# Patient Record
Sex: FEMALE | Race: WHITE | Employment: OTHER | RURAL
[De-identification: names, ages, dates, MRNs, and addresses within clinical notes are randomized per-mention and may not be internally consistent; named-entity substitution may affect disease eponyms.]

---

## 2017-01-25 ENCOUNTER — OFFICE VISIT (OUTPATIENT)
Dept: FAMILY MEDICINE CLINIC | Age: 47
End: 2017-01-25

## 2017-01-25 VITALS
HEART RATE: 86 BPM | WEIGHT: 211.6 LBS | DIASTOLIC BLOOD PRESSURE: 84 MMHG | BODY MASS INDEX: 36.12 KG/M2 | HEIGHT: 64 IN | SYSTOLIC BLOOD PRESSURE: 122 MMHG | RESPIRATION RATE: 20 BRPM | TEMPERATURE: 98.4 F | OXYGEN SATURATION: 98 %

## 2017-01-25 DIAGNOSIS — M79.602 PARESTHESIA AND PAIN OF BOTH UPPER EXTREMITIES: ICD-10-CM

## 2017-01-25 DIAGNOSIS — M79.601 PARESTHESIA AND PAIN OF BOTH UPPER EXTREMITIES: ICD-10-CM

## 2017-01-25 DIAGNOSIS — E78.00 HYPERCHOLESTEROLEMIA: ICD-10-CM

## 2017-01-25 DIAGNOSIS — I10 ESSENTIAL HYPERTENSION, BENIGN: ICD-10-CM

## 2017-01-25 DIAGNOSIS — M79.7 FIBROMYALGIA: Primary | ICD-10-CM

## 2017-01-25 DIAGNOSIS — R20.2 PARESTHESIA AND PAIN OF BOTH UPPER EXTREMITIES: ICD-10-CM

## 2017-01-25 DIAGNOSIS — E03.9 UNSPECIFIED HYPOTHYROIDISM: ICD-10-CM

## 2017-01-25 RX ORDER — TIZANIDINE HYDROCHLORIDE 4 MG/1
CAPSULE, GELATIN COATED ORAL
Qty: 90 CAP | Refills: 1 | Status: SHIPPED | OUTPATIENT
Start: 2017-01-25 | End: 2017-05-10 | Stop reason: SINTOL

## 2017-01-25 RX ORDER — TRAMADOL HYDROCHLORIDE 50 MG/1
50 TABLET ORAL
Qty: 270 TAB | Refills: 1 | Status: SHIPPED | OUTPATIENT
Start: 2017-01-25 | End: 2017-07-28 | Stop reason: SDUPTHER

## 2017-01-25 NOTE — PROGRESS NOTES
HISTORY OF PRESENT ILLNESS  Branden Bradshaw is a 48 y.o. female. HPI  FU fibromyalgia. Also C/O tightness and tingling across left clavicle when she lifts or carry anything heavy. Pt also wants to try different muscle relaxer. We reviewed her lab results: mild high cholesterol. Review of Systems   Musculoskeletal: Positive for myalgias. Neurological: Positive for tingling. All other systems reviewed and are negative.     Past Medical History   Diagnosis Date    Abnormal Pap smear 1/2/2012     FUNMI -evaluation negative    Arthritis     Asthma     Atypical squamous cells of undetermined significance (ASCUS) on Papanicolaou smear of cervix 10/08/15     HPV Negative    Autoimmune disease ([facility]      sjogrens    Bartholin's gland cyst      right marsupialization    Diverticulitis      in the past- has seen Dr. Rosa Franco for evaluation    Dyspareunia     Endometriosis     Fibromyalgia     GERD (gastroesophageal reflux disease)     Hiatal hernia     Hypercholesterolemia 1/25/2017    Hypertension     Hypothyroid     IBS (irritable bowel syndrome)     Lichen sclerosus     Nausea & vomiting     Other ill-defined conditions(799.89)      left leg edema    Pelvic pain     Rosacea     Sjogren's syndrome ([facility] [address]      sees Dr. 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     vulvar biopsy done-suggestive of LS     Past Surgical History   Procedure Laterality Date    Hx cholecystectomy      Hx orthopaedic Left      ACLX2    Hx laparoscopic supracervical hysterectomy  11/25/13     w/RSO/hysterectomy    Hx other surgical       Bartholins Marsupialization    Hx colposcopy  2/14/12     No dysplasia    Hx other surgical  02/07     LSO--Laparotomy w/ [place] also    Hx other surgical  11/08     vulvar biopsy    Hx dilation and curettage  2/14/2012     benign tissue- problems with anesthesia afterwards    Hx oophorectomy      Hx hysterectomy       Current Outpatient Prescriptions   Medication Sig Dispense Refill    traMADol (ULTRAM) 50 mg tablet Take 1 Tab by mouth every eight (8) hours as needed for Pain. 270 Tab 1    tiZANidine (ZANAFLEX) 4 mg capsule Take 1 at bedtime. Indications: MUSCLE SPASM 90 Cap 1    estradiol (VIVELLE) 0.1 mg/24 hr APPLY 1 PATCH TO SKIN EVERY MONDAY AND THURSDAY 8 Patch 1    losartan-hydrochlorothiazide (HYZAAR) 100-25 mg per tablet TAKE 1 TABLET DAILY 90 Tab 1    montelukast (SINGULAIR) 10 mg tablet TAKE 1 TABLET DAILY 90 Tab 1    SAVELLA 25 mg tablet TAKE 1 TABLET TWICE A  Tab 1    SYNTHROID 137 mcg tablet Take 137 mcg by mouth Daily (before breakfast).  valACYclovir (VALTREX) 500 mg tablet Take 500 mg by mouth daily.  multivitamin (ONE A DAY) tablet Take 1 Tab by mouth daily.  omeprazole (PRILOSEC) 20 mg capsule Take 20 mg by mouth daily.  ergocalciferol, vitamin d2, 50,000 unit Tab Take 50,000 Units by mouth. Indications: VITAMIN D DEFICIENCY      EPINEPHrine (EPIPEN) 0.3 mg/0.3 mL (1:1,000) injection 0.3 mL by IntraMUSCular route once as needed for up to 1 dose. 2 Each 1    albuterol (VENTOLIN HFA) 90 mcg/actuation inhaler Take 2 Puffs by inhalation every four (4) hours as needed for Wheezing. 3 Inhaler 1    acetaminophen (TYLENOL EXTRA STRENGTH) 500 mg tablet Take 1,000 mg by mouth every six (6) hours as needed for Pain. Social History     Social History    Marital status:      Spouse name: N/A    Number of children: N/A    Years of education: N/A     Occupational History    Not on file.      Social History Main Topics    Smoking status: Never Smoker    Smokeless tobacco: Never Used    Alcohol use 0.0 oz/week      Comment: very rarely-less than 5X/year    Drug use: No    Sexual activity: Yes     Partners: Male     Birth control/ protection: Surgical      Comment: Hysterectomy     Other Topics Concern    Not on file     Social History Narrative Family History   Problem Relation Age of Onset    Heart Disease Mother      pacemaker, defibrillator    Osteoporosis Mother     Diabetes Brother     Hypertension Father        Visit Vitals    /84 (BP 1 Location: Left arm, BP Patient Position: Sitting)  Comment: nicole    Pulse 86    Temp 98.4 °F (36.9 °C) (Oral)    Resp 20    Ht 5' 4\" (1.626 m)    Wt 211 lb 9.6 oz (96 kg)    LMP 11/05/2013    SpO2 98%    BMI 36.32 kg/m2       Physical Exam   Constitutional: She appears well-developed and well-nourished. Neck: Normal range of motion. Neck supple. No thyroid mass present. Musculoskeletal:        Right shoulder: Normal. She exhibits normal range of motion and no tenderness. Left shoulder: Normal. She exhibits normal range of motion and no tenderness. Vitals reviewed. ASSESSMENT and PLAN    ICD-10-CM ICD-9-CM    1. Fibromyalgia M79.7 729.1 traMADol (ULTRAM) 50 mg tablet      tiZANidine (ZANAFLEX) 4 mg capsule   2. Essential hypertension, benign I10 401.1    3. Unspecified hypothyroidism E03.9 244.9    4. Paresthesia and pain of both upper extremities R20.2 782.0     M79.601 729.5     M79.602     5. Hypercholesterolemia E78.00 272.0      Switch muscle relaxer to Tizanidine QHS. Refill Tramadol for pain. FU with Rheumatology regarding tingling and loss of upper arm strength. ? Cervical disc disease. Watch cholesterol in diet. Patient Instructions        Learning About High Cholesterol  What is high cholesterol? Cholesterol is a type of fat in your blood. It is needed for many body functions, such as making new cells. Cholesterol is made by your body. It also comes from food you eat. If you have too much cholesterol, it starts to build up in your arteries. This is called hardening of the arteries, or atherosclerosis. High cholesterol raises your risk of a heart attack and stroke. There are different types of cholesterol. LDL is the \"bad\" cholesterol.  High LDL can raise your risk for heart disease, heart attack, and stroke. HDL is the \"good\" cholesterol. High HDL is linked with a lower risk for heart disease, heart attack, and stroke. Your cholesterol levels help your doctor find out your risk for having a heart attack or stroke. How can you prevent high cholesterol? A heart-healthy lifestyle can help you prevent high cholesterol. This lifestyle helps lower your risk for a heart attack and stroke. · Eat heart-healthy foods. ¨ Eat fruits, vegetables, whole grains (like oatmeal), dried beans and peas, nuts and seeds, soy products (like tofu), and fat-free or low-fat dairy products. ¨ Replace butter, margarine, and hydrogenated or partially hydrogenated oils with olive and canola oils. (Canola oil margarine without trans fat is fine.)  ¨ Replace red meat with fish, poultry, and soy protein (like tofu). ¨ Limit processed and packaged foods like chips, crackers, and cookies. · Be active. Exercise can improve your cholesterol level. Get at least 30 minutes of exercise on most days of the week. Walking is a good choice. You also may want to do other activities, such as running, swimming, cycling, or playing tennis or team sports. · Stay at a healthy weight. Lose weight if you need to. · Don't smoke. If you need help quitting, talk to your doctor about stop-smoking programs and medicines. These can increase your chances of quitting for good. How is high cholesterol treated? The goal of treatment is to reduce your chances of having a heart attack or stroke. The goal is not to lower your cholesterol numbers only. · You may make lifestyle changes, such as eating healthy foods, not smoking, losing weight, and being more active. · You may have to take medicine. Follow-up care is a key part of your treatment and safety. Be sure to make and go to all appointments, and call your doctor if you are having problems.  It's also a good idea to know your test results and keep a list of the medicines you take. Where can you learn more? Go to http://jesus-manasa.info/. Enter R179 in the search box to learn more about \"Learning About High Cholesterol. \"  Current as of: January 27, 2016  Content Version: 11.1  © 1385-6287 [employer]. Care instructions adapted under license by [employer] (which disclaims liability or warranty for this information). If you have questions about a medical condition or this instruction, always ask your healthcare professional. [address] any warranty or liability for your use of this information.

## 2017-01-25 NOTE — PATIENT INSTRUCTIONS
Learning About High Cholesterol  What is high cholesterol? Cholesterol is a type of fat in your blood. It is needed for many body functions, such as making new cells. Cholesterol is made by your body. It also comes from food you eat. If you have too much cholesterol, it starts to build up in your arteries. This is called hardening of the arteries, or atherosclerosis. High cholesterol raises your risk of a heart attack and stroke. There are different types of cholesterol. LDL is the \"bad\" cholesterol. High LDL can raise your risk for heart disease, heart attack, and stroke. HDL is the \"good\" cholesterol. High HDL is linked with a lower risk for heart disease, heart attack, and stroke. Your cholesterol levels help your doctor find out your risk for having a heart attack or stroke. How can you prevent high cholesterol? A heart-healthy lifestyle can help you prevent high cholesterol. This lifestyle helps lower your risk for a heart attack and stroke. · Eat heart-healthy foods. ¨ Eat fruits, vegetables, whole grains (like oatmeal), dried beans and peas, nuts and seeds, soy products (like tofu), and fat-free or low-fat dairy products. ¨ Replace butter, margarine, and hydrogenated or partially hydrogenated oils with olive and canola oils. (Canola oil margarine without trans fat is fine.)  ¨ Replace red meat with fish, poultry, and soy protein (like tofu). ¨ Limit processed and packaged foods like chips, crackers, and cookies. · Be active. Exercise can improve your cholesterol level. Get at least 30 minutes of exercise on most days of the week. Walking is a good choice. You also may want to do other activities, such as running, swimming, cycling, or playing tennis or team sports. · Stay at a healthy weight. Lose weight if you need to. · Don't smoke. If you need help quitting, talk to your doctor about stop-smoking programs and medicines. These can increase your chances of quitting for good.   How is high cholesterol treated? The goal of treatment is to reduce your chances of having a heart attack or stroke. The goal is not to lower your cholesterol numbers only. · You may make lifestyle changes, such as eating healthy foods, not smoking, losing weight, and being more active. · You may have to take medicine. Follow-up care is a key part of your treatment and safety. Be sure to make and go to all appointments, and call your doctor if you are having problems. It's also a good idea to know your test results and keep a list of the medicines you take. Where can you learn more? Go to http://jesus-manasa.info/. Enter W480 in the search box to learn more about \"Learning About High Cholesterol. \"  Current as of: January 27, 2016  Content Version: 11.1  © 0434-9990 [employer]. Care instructions adapted under license by [employer] (which disclaims liability or warranty for this information). If you have questions about a medical condition or this instruction, always ask your healthcare professional. [address] any warranty or liability for your use of this information.

## 2017-01-25 NOTE — MR AVS SNAPSHOT
Visit Information Date & Time Provider Department Dept. Phone Encounter #  
 3/21/6184  7:98 PM Paul [facility]onMatt[facility] [phone] Your Appointments 3/10/2017  2:20 PM  
MAMMOGRAPHY with MAMMOGRAM, MIGUEL ANGEL Li[place] ([address]) Appt Note: mammo + ae  HW/uh  
 [address] [place] [address]  
[phone]  
  
   
 [address] [place] [address]  
  
    
 3/10/2017  2:40 PM  
ESTABLISHED PATIENT with Janine Hall MD  
[place] ([address]) Appt Note: mammo + ae  HW/uh  
 [address] [facility] [address] [id number]  
[address] [place] [address] Upcoming Health Maintenance Date Due Pneumococcal 19-64 Medium Risk (1 of 1 - PPSV23) 3/14/1987 PAP AKA CERVICAL CYTOLOGY 10/8/2018 DTaP/Tdap/Td series (2 - Td) 1/25/2027 Allergies as of 1/25/2017  Review Complete On: 3/68/4566 By: Paul Damon MD  
  
 Severity Noted Reaction Type Reactions Monsel's [Ferric Subsulfate]  11/18/2013    Swelling Extreme burning, skin sloughing Other Plant, Animal, Environmental  05/19/2014    Unknown (comments) Vinegar Premarin [Conjugated Estrogens]  11/25/2013   Topical Itching Current Immunizations  Reviewed on 10/8/2012 Name Date Influenza Vaccine (Quad) PF 12/9/2015, 11/26/2014 Influenza Vaccine Split 10/8/2012 Not reviewed this visit You Were Diagnosed With   
  
 Codes Comments Fibromyalgia    -  Primary ICD-10-CM: M79.7 ICD-9-CM: 729.1 Essential hypertension, benign     ICD-10-CM: I10 
ICD-9-CM: 401.1 Unspecified hypothyroidism     ICD-10-CM: E03.9 ICD-9-CM: 244.9 Paresthesia and pain of both upper extremities     ICD-10-CM: R20.2, M79.601, M79.602 ICD-9-CM: 782.0, 729.5 Hypercholesterolemia     ICD-10-CM: E78.00 ICD-9-CM: 272.0 Vitals BP Pulse Temp Resp Height(growth percentile) Weight(growth percentile) 122/84 (BP 1 Location: Left arm, BP Patient Position: Sitting) 86 98.4 °F (36.9 °C) (Oral) 20 5' 4\" (1.626 m) 211 lb 9.6 oz (96 kg) LMP SpO2 BMI OB Status Smoking Status 11/05/2013 98% 36.32 kg/m2 Hysterectomy Never Smoker Vitals History BMI and BSA Data Body Mass Index Body Surface Area  
 36.32 kg/m 2 2.08 m 2 Preferred Pharmacy Pharmacy Name Phone [address] [facility] [phone] Your Updated Medication List  
  
   
This list is accurate as of: 1/25/17  4:10 PM.  Always use your most recent med list.  
  
  
  
  
 albuterol 90 mcg/actuation inhaler Commonly known as:  VENTOLIN HFA Take 2 Puffs by inhalation every four (4) hours as needed for Wheezing. EPINEPHrine 0.3 mg/0.3 mL injection Commonly known as:  EPIPEN  
0.3 mL by IntraMUSCular route once as needed for up to 1 dose. ergocalciferol (vitamin d2) 50,000 unit Tab Take 50,000 Units by mouth. Indications: VITAMIN D DEFICIENCY  
  
 estradiol 0.1 mg/24 hr  
Commonly known as:  VIVELLE  
APPLY 1 PATCH TO SKIN EVERY MONDAY AND THURSDAY  
  
 losartan-hydroCHLOROthiazide 100-25 mg per tablet Commonly known as:  HYZAAR  
TAKE 1 TABLET DAILY  
  
 montelukast 10 mg tablet Commonly known as:  SINGULAIR  
TAKE 1 TABLET DAILY  
  
 multivitamin tablet Commonly known as:  ONE A DAY Take 1 Tab by mouth daily. omeprazole 20 mg capsule Commonly known as:  PRILOSEC Take 20 mg by mouth daily. SAVELLA 25 mg tablet Generic drug:  Milnacipran TAKE 1 TABLET TWICE A DAY  
  
 SYNTHROID 137 mcg tablet Generic drug:  levothyroxine Take 137 mcg by mouth Daily (before breakfast). tiZANidine 4 mg capsule Commonly known as:  Lourdes Mera Take 1 at bedtime. Indications: MUSCLE SPASM  
  
 traMADol 50 mg tablet Commonly known as:  Corinn Morning  
 Take 1 Tab by mouth every eight (8) hours as needed for Pain. TYLENOL EXTRA STRENGTH 500 mg tablet Generic drug:  acetaminophen Take 1,000 mg by mouth every six (6) hours as needed for Pain. VALTREX 500 mg tablet Generic drug:  valACYclovir Take 500 mg by mouth daily. Prescriptions Printed Refills  
 traMADol (ULTRAM) 50 mg tablet 1 Sig: Take 1 Tab by mouth every eight (8) hours as needed for Pain. Class: Print Route: Oral  
  
Prescriptions Sent to Pharmacy Refills  
 tiZANidine (ZANAFLEX) 4 mg capsule 1 Sig: Take 1 at bedtime. Indications: MUSCLE SPASM Class: Normal  
 Pharmacy: [address], [address] [place] [address] #: [phone] Patient Instructions Learning About High Cholesterol What is high cholesterol? Cholesterol is a type of fat in your blood. It is needed for many body functions, such as making new cells. Cholesterol is made by your body. It also comes from food you eat. If you have too much cholesterol, it starts to build up in your arteries. This is called hardening of the arteries, or atherosclerosis. High cholesterol raises your risk of a heart attack and stroke. There are different types of cholesterol. LDL is the \"bad\" cholesterol. High LDL can raise your risk for heart disease, heart attack, and stroke. HDL is the \"good\" cholesterol. High HDL is linked with a lower risk for heart disease, heart attack, and stroke. Your cholesterol levels help your doctor find out your risk for having a heart attack or stroke. How can you prevent high cholesterol? A heart-healthy lifestyle can help you prevent high cholesterol. This lifestyle helps lower your risk for a heart attack and stroke. · Eat heart-healthy foods. ¨ Eat fruits, vegetables, whole grains (like oatmeal), dried beans and peas, nuts and seeds, soy products (like tofu), and fat-free or low-fat dairy products. ¨ Replace butter, margarine, and hydrogenated or partially hydrogenated oils with olive and canola oils. (Canola oil margarine without trans fat is fine.) ¨ Replace red meat with fish, poultry, and soy protein (like tofu). ¨ Limit processed and packaged foods like chips, crackers, and cookies. · Be active. Exercise can improve your cholesterol level. Get at least 30 minutes of exercise on most days of the week. Walking is a good choice. You also may want to do other activities, such as running, swimming, cycling, or playing tennis or team sports. · Stay at a healthy weight. Lose weight if you need to. · Don't smoke. If you need help quitting, talk to your doctor about stop-smoking programs and medicines. These can increase your chances of quitting for good. How is high cholesterol treated? The goal of treatment is to reduce your chances of having a heart attack or stroke. The goal is not to lower your cholesterol numbers only. · You may make lifestyle changes, such as eating healthy foods, not smoking, losing weight, and being more active. · You may have to take medicine. Follow-up care is a key part of your treatment and safety. Be sure to make and go to all appointments, and call your doctor if you are having problems. It's also a good idea to know your test results and keep a list of the medicines you take. Where can you learn more? Go to http://jesus-manasa.info/. Enter N668 in the search box to learn more about \"Learning About High Cholesterol. \" Current as of: January 27, 2016 Content Version: 11.1 © 2258-2790 [employer], Incorporated. Care instructions adapted under license by [employer] (which disclaims liability or warranty for this information). If you have questions about a medical condition or this instruction, always ask your healthcare professional. [address] any warranty or liability for your use of this information. Introducing [facility]! Dear Trevon Duncan: 
Thank you for requesting a [employer] account. Our records indicate that you already have an active [employer] account. You can access your account anytime at [email or web address] Did you know that you can access your hospital and ER discharge instructions at any time in [employer]? You can also review all of your test results from your hospital stay or ER visit. Additional Information If you have questions, please visit the Frequently Asked Questions section of the [employer] website at [email or web address] Remember, [employer] is NOT to be used for urgent needs. For medical emergencies, dial 911. Now available from your iPhone and Android! Please provide this summary of care documentation to your next provider. Your primary care clinician is listed as Nathan Fee. If you have any questions after today's visit, please call [phone].

## 2017-01-25 NOTE — PROGRESS NOTES
Identified pt with two pt identifiers(name and ). Chief Complaint   Patient presents with    Medication Refill    Results    Shoulder Pain     her shoulders mostly the left - feels like they are binding and she does not have the strength she used to - started in nov     Referral Follow Up     Dr Anna Marie Chambers Maintenance Due   Topic    Pneumococcal 19-64 Medium Risk (1 of 1 - PPSV23)       Wt Readings from Last 3 Encounters:   17 211 lb 9.6 oz (96 kg)   16 208 lb 3.2 oz (94.4 kg)   16 210 lb (95.3 kg)     Temp Readings from Last 3 Encounters:   17 98.4 °F (36.9 °C) (Oral)   16 97.9 °F (36.6 °C) (Oral)   12/09/15 97.8 °F (36.6 °C) (Oral)     BP Readings from Last 3 Encounters:   17 122/84   16 138/77   16 132/83     Pulse Readings from Last 3 Encounters:   17 86   16 100   16 93         Learning Assessment:  :     Learning Assessment 2014   PRIMARY LEARNER Patient Patient   HIGHEST LEVEL OF EDUCATION - PRIMARY LEARNER  - SOME COLLEGE   BARRIERS PRIMARY LEARNER - NONE   CO-LEARNER CAREGIVER - No   PRIMARY LANGUAGE ENGLISH ENGLISH   LEARNER PREFERENCE PRIMARY DEMONSTRATION DEMONSTRATION     - OTHER (COMMENT)   ANSWERED BY patient self   RELATIONSHIP SELF SELF       Depression Screening:  :     PHQ 2 / 9, over the last two weeks 2017   Little interest or pleasure in doing things Not at all   Feeling down, depressed or hopeless Not at all   Total Score PHQ 2 0       Fall Risk Assessment:  :     Fall Risk Assessment, last 12 mths 2017   Able to walk? Yes   Fall in past 12 months? No       Abuse Screening:  :     Abuse Screening Questionnaire 2017   Do you ever feel afraid of your partner? N N   Are you in a relationship with someone who physically or mentally threatens you? N N   Is it safe for you to go home?  Y Y       Coordination of Care Questionnaire:  :     1) Have you been to an emergency room, urgent care clinic since your last visit? no   Hospitalized since your last visit? no             2) Have you seen or consulted any other health care providers outside of [address] [place] [address] since your last visit? Yes she sees her Endocrinologist  (Include any pap smears or colon screenings in this section.)    3) Do you have an Advance Directive on file? no  Are you interested in receiving information about Advance Directives? no    Reviewed record in preparation for visit and have obtained necessary documentation. Medication reconciliation up to date and corrected with patient at this time.

## 2017-02-24 RX ORDER — MILNACIPRAN HYDROCHLORIDE 25 MG/1
TABLET, FILM COATED ORAL
Qty: 180 TAB | Refills: 0 | Status: SHIPPED | OUTPATIENT
Start: 2017-02-24 | End: 2017-05-10 | Stop reason: SDUPTHER

## 2017-03-03 RX ORDER — MONTELUKAST SODIUM 10 MG/1
TABLET ORAL
Qty: 90 TAB | Refills: 0 | Status: SHIPPED | OUTPATIENT
Start: 2017-03-03 | End: 2017-05-10 | Stop reason: SDUPTHER

## 2017-03-10 ENCOUNTER — OFFICE VISIT (OUTPATIENT)
Dept: OBGYN CLINIC | Age: 47
End: 2017-03-10

## 2017-03-10 ENCOUNTER — APPOINTMENT (OUTPATIENT)
Dept: MAMMOGRAPHY | Age: 47
End: 2017-03-10

## 2017-03-10 VITALS
WEIGHT: 213 LBS | DIASTOLIC BLOOD PRESSURE: 60 MMHG | HEIGHT: 64 IN | SYSTOLIC BLOOD PRESSURE: 128 MMHG | BODY MASS INDEX: 36.37 KG/M2 | RESPIRATION RATE: 18 BRPM

## 2017-03-10 DIAGNOSIS — Z01.419 WELL WOMAN EXAM WITH ROUTINE GYNECOLOGICAL EXAM: Primary | ICD-10-CM

## 2017-03-10 DIAGNOSIS — Z12.31 ENCOUNTER FOR SCREENING MAMMOGRAM FOR MALIGNANT NEOPLASM OF BREAST: ICD-10-CM

## 2017-03-10 DIAGNOSIS — Z11.51 SCREENING FOR HPV (HUMAN PAPILLOMAVIRUS): ICD-10-CM

## 2017-03-10 RX ORDER — ESTRADIOL 0.1 MG/D
FILM, EXTENDED RELEASE TRANSDERMAL
Qty: 24 PATCH | Refills: 3 | Status: SHIPPED | OUTPATIENT
Start: 2017-03-10 | End: 2019-01-11

## 2017-03-10 NOTE — PROGRESS NOTES
Dodie Mackay is a [de-identified] ,  48 y.o. female [facility] whose Patient's last menstrual period was 11/05/2013. was on  who presents for her annual checkup. She is having breast tenderness - denies nipple discharge. Very mild. No other changes. US last year was normal.    Hormone Status:    She is not having vasomotor symptoms. Sexual history:    She  reports that she currently engages in sexual activity and has had male partners. She reports using the following method of birth control/protection: Surgical.    Medical conditions:    Since her last annual GYN exam about one year ago, she has had the following changes in her health history: none. Pap and Mammogram History:    Her most recent Pap smear was abnormal obtained 1 year(s) ago. The patient had her mammogram today in our office. Breast Cancer History/Substance Abuse:    She has no and a family history of breast cancer. Osteoporosis History:    Family history does not include a first or second degree relative with osteopenia or osteoporosis. A bone density scan was not obtained. She is currently taking calcium and vit D.       Past Medical History:   Diagnosis Date    Abnormal Pap smear 1/2/2012    FUNMI -evaluation negative    Arthritis     Asthma     Atypical squamous cells of undetermined significance (ASCUS) on Papanicolaou smear of cervix 10/08/15    HPV Negative    Autoimmune disease ([address]     sjogrens    Bartholin's gland cyst     right marsupialization    Diverticulitis     in the past- has seen Dr. De Paz for evaluation    Dyspareunia     Endometriosis     Fibromyalgia     GERD (gastroesophageal reflux disease)     Hiatal hernia     Hypercholesterolemia 1/25/2017    Hypertension     Hypothyroid     IBS (irritable bowel syndrome)     Lichen sclerosus     Nausea & vomiting     Other ill-defined conditions(799.89)     left leg edema    Pelvic pain     Rosacea     Sjogren's syndrome ([address] sees Dr. 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    vulvar biopsy done-suggestive of LS     Past Surgical History:   Procedure Laterality Date    HX CHOLECYSTECTOMY      HX COLPOSCOPY  2/14/12    No dysplasia    HX DILATION AND CURETTAGE  2/14/2012    benign tissue- problems with anesthesia afterwards    HX HYSTERECTOMY      HX LAPAROSCOPIC SUPRACERVICAL HYSTERECTOMY  11/25/13    w/RSO/hysterectomy    HX OOPHORECTOMY      HX ORTHOPAEDIC Left     ACLX2    HX OTHER SURGICAL      Bartholins Marsupialization    HX OTHER SURGICAL  02/07    LSO--Laparotomy w/ [place] also    HX OTHER SURGICAL  11/08    vulvar biopsy     Tobacco History:  reports that she has never smoked. She has never used smokeless tobacco.  Alcohol Abuse:  reports that she drinks alcohol. Drug Abuse:  reports that she does not use illicit drugs. Current Outpatient Prescriptions   Medication Sig Dispense Refill    montelukast (SINGULAIR) 10 mg tablet TAKE 1 TABLET DAILY 90 Tab 0    SAVELLA 25 mg tablet TAKE 1 TABLET TWICE A  Tab 0    traMADol (ULTRAM) 50 mg tablet Take 1 Tab by mouth every eight (8) hours as needed for Pain. 270 Tab 1    estradiol (VIVELLE) 0.1 mg/24 hr APPLY 1 PATCH TO SKIN EVERY MONDAY AND THURSDAY 8 Patch 1    losartan-hydrochlorothiazide (HYZAAR) 100-25 mg per tablet TAKE 1 TABLET DAILY 90 Tab 1    SYNTHROID 137 mcg tablet Take 137 mcg by mouth Daily (before breakfast).  EPINEPHrine (EPIPEN) 0.3 mg/0.3 mL (1:1,000) injection 0.3 mL by IntraMUSCular route once as needed for up to 1 dose. 2 Each 1    valACYclovir (VALTREX) 500 mg tablet Take 500 mg by mouth daily.  albuterol (VENTOLIN HFA) 90 mcg/actuation inhaler Take 2 Puffs by inhalation every four (4) hours as needed for Wheezing. 3 Inhaler 1    acetaminophen (TYLENOL EXTRA STRENGTH) 500 mg tablet Take 1,000 mg by mouth every six (6) hours as needed for Pain.       multivitamin (ONE A DAY) tablet Take 1 Tab by mouth daily.  omeprazole (PRILOSEC) 20 mg capsule Take 20 mg by mouth daily.  ergocalciferol, vitamin d2, 50,000 unit Tab Take 50,000 Units by mouth. Indications: VITAMIN D DEFICIENCY      tiZANidine (ZANAFLEX) 4 mg capsule Take 1 at bedtime. Indications: MUSCLE SPASM 90 Cap 1     Allergies: Monsel's [ferric subsulfate]; Other plant, animal, environmental; and Premarin [conjugated estrogens]   Social History     Social History    Marital status:      Spouse name: N/A    Number of children: N/A    Years of education: N/A     Occupational History    Not on file.      Social History Main Topics    Smoking status: Never Smoker    Smokeless tobacco: Never Used    Alcohol use 0.0 oz/week      Comment: very rarely-less than 5X/year    Drug use: No    Sexual activity: Yes     Partners: Male     Birth control/ protection: Surgical      Comment: Hysterectomy     Other Topics Concern    Not on file     Social History Narrative     Patient Active Problem List   Diagnosis Code    Unspecified hypothyroidism E03.9    Acne rosacea L71.9    Esophageal reflux K21.9    Unspecified asthma(493.90)     Cervical spondylosis M47.812    Fibromyalgia M79.7    Essential hypertension, benign I10    Abnormal Pap smear [id number]    Menopause Z78.0    Breast lump on left side at 10 o'clock position N63    Hypercholesterolemia E78.00       Review of Systems - History obtained from the patient  Constitutional: negative for weight loss, fever, night sweats  HEENT: negative for hearing loss, earache, congestion, snoring, sorethroat  CV: negative for chest pain, palpitations, edema  Resp: negative for cough, shortness of breath, wheezing  GI: negative for change in bowel habits, abdominal pain, black or bloody stools  : negative for frequency, dysuria, hematuria, vaginal discharge  MSK: negative for back pain, joint pain, muscle pain  Breast: negative for breast lumps, nipple discharge, galactorrhea  Skin :negative for itching, rash, hives  Neuro: negative for dizziness, headache, confusion, weakness  Psych: negative for anxiety, depression, change in mood  Heme/lymph: negative for bleeding, bruising, pallor    Physical Exam    Visit Vitals    /60 (BP 1 Location: Right arm, BP Patient Position: Sitting)    Resp 18    Ht 5' 4\" (1.626 m)    Wt 213 lb (96.6 kg)    LMP 11/05/2013    BMI 36.56 kg/m2     Constitutional  · Appearance: well-nourished, well developed, alert, in no acute distress    HENT  · Head and Face: appears normal    Neck  · Inspection/Palpation: normal appearance, no masses or tenderness  · Lymph Nodes: no lymphadenopathy present  · Thyroid: gland size normal, nontender, no nodules or masses present on palpation    Chest  · Respiratory Effort: breathing normal        Auscultation: normal breath sounds    Cardiovascular  · Heart:  · Auscultation: regular rate and rhythm without murmur    Breasts  · Inspection of Breasts: breasts symmetrical, no skin changes, no discharge present, nipple appearance normal, no skin retraction present  · Palpation of Breasts and Axillae: no masses present on palpation, no breast tenderness  · Axillary Lymph Nodes: no lymphadenopathy present    Gastrointestinal  · Abdominal Examination: abdomen non-tender to palpation, normal bowel sounds, no masses present  · Liver and spleen: no hepatomegaly present, spleen not palpable  · Hernias: no hernias identified    Genitourinary  · External Genitalia: normal appearance for age, no discharge present, no tenderness present, no inflammatory lesions present, no masses present, no atrophy present  · Vagina: normal vaginal vault without central or paravaginal defects, no discharge present, no inflammatory lesions present, no masses present  · Bladder: non-tender to palpation  · Urethra: appears normal  · Cervix: normal  · Uterus: absent  · Adnexa: no adnexal tenderness present, no adnexal masses present  · Perineum: perineum within normal limits, no evidence of trauma, no rashes or skin lesions present  · Anus: anus within normal limits, no hemorrhoids present  · Inguinal Lymph Nodes: no lymphadenopathy present      Skin  · General Inspection: no rash, no lesions identified    Neurologic/Psychiatric  · Mental Status:  · Orientation: grossly oriented to person, place and time  · Mood and Affect: mood normal, affect appropriate    . Assessment:  Routine gynecologic examination  Her current medical status is satisfactory with no evidence of significant gynecologic issues.     Plan:  Counseled re: diet, exercise, healthy lifestyle  Return for yearly wellness visits  Rec annual mammogram

## 2017-03-10 NOTE — PATIENT INSTRUCTIONS

## 2017-03-14 LAB
CYTOLOGIST CVX/VAG CYTO: NORMAL
CYTOLOGY CVX/VAG DOC THIN PREP: NORMAL
CYTOLOGY HISTORY:: NORMAL
DX ICD CODE: NORMAL
HPV I/H RISK 1 DNA CVX QL PROBE+SIG AMP: NEGATIVE
Lab: NORMAL
OTHER STN SPEC: NORMAL
PATH REPORT.FINAL DX SPEC: NORMAL
STAT OF ADQ CVX/VAG CYTO-IMP: NORMAL

## 2017-04-13 RX ORDER — LOSARTAN POTASSIUM AND HYDROCHLOROTHIAZIDE 25; 100 MG/1; MG/1
TABLET ORAL
Qty: 90 TAB | Refills: 0 | Status: SHIPPED | OUTPATIENT
Start: 2017-04-13 | End: 2017-05-10 | Stop reason: SDUPTHER

## 2017-05-08 ENCOUNTER — OFFICE VISIT (OUTPATIENT)
Dept: RHEUMATOLOGY | Age: 47
End: 2017-05-08

## 2017-05-08 VITALS
HEART RATE: 85 BPM | OXYGEN SATURATION: 98 % | DIASTOLIC BLOOD PRESSURE: 82 MMHG | BODY MASS INDEX: 36.26 KG/M2 | WEIGHT: 212.4 LBS | TEMPERATURE: 97.8 F | HEIGHT: 64 IN | RESPIRATION RATE: 18 BRPM | SYSTOLIC BLOOD PRESSURE: 153 MMHG

## 2017-05-08 DIAGNOSIS — M25.512 CHRONIC LEFT SHOULDER PAIN: Primary | ICD-10-CM

## 2017-05-08 DIAGNOSIS — M35.00 SJOGREN'S SYNDROME, WITH UNSPECIFIED ORGAN INVOLVEMENT (HCC): ICD-10-CM

## 2017-05-08 DIAGNOSIS — G89.29 CHRONIC LEFT SHOULDER PAIN: Primary | ICD-10-CM

## 2017-05-08 DIAGNOSIS — G60.3 IDIOPATHIC PROGRESSIVE NEUROPATHY: ICD-10-CM

## 2017-05-08 RX ORDER — DOXYCYCLINE HYCLATE 75 MG/1
TABLET, DELAYED RELEASE ORAL
COMMUNITY
Start: 2017-04-06 | End: 2017-05-08 | Stop reason: ALTCHOICE

## 2017-05-08 RX ORDER — VALACYCLOVIR HYDROCHLORIDE 1 G/1
TABLET, FILM COATED ORAL
COMMUNITY
Start: 2017-04-06 | End: 2017-05-08 | Stop reason: ALTCHOICE

## 2017-05-08 RX ORDER — ERGOCALCIFEROL 1.25 MG/1
CAPSULE ORAL
COMMUNITY
Start: 2017-04-02 | End: 2018-01-12 | Stop reason: SDUPTHER

## 2017-05-08 NOTE — PROGRESS NOTES
RHEUMATOLOGY PROBLEM LIST AND CHIEF COMPLAINT   1. Sjogren's syndrome - dry eyes, arthralgia, SSA positive, elevated CRP   2. Myofascial pain syndrome    INTERVAL HISTORY  This is a 50 y.o.  female. Today, the patient complains of pain in the joints. Location: shoulder  Severity:  6 on a scale of 0-10  Timing: intermittent   Duration:  a few months  Modifying factors: none  Context/Associated signs and symptoms: The patient has not been seen 2.5 years ago. Today, she complains of pain in her back. She also complain of pain \"under her shoulder blades\" and numbness in her arms. She demonstrates that her shoulder pain is beneath her clavicle. This pain is worse with certain movements and activities. She denies any issues with the range of motion in her shoulders and she denies pain in her shoulder when sleeping on it. She notes that when sleeping a night, her arm numbness will wake her in the night. This numbness is in the 1st-3rd fingers. However throughout the day she has occasional numbness in her 3rd-5th fingers.  She denies any issues with dry eyes or dry mouth.      RHEUMATOLOGY REVIEW OF SYSTEMS   Positives as per history  Negatives as follows:  CONSTITUTlONAL:  Denies unexplained persistent fevers, weight change, chronic fatigue  HEAD/EYES:   Denies eye redness, blurry vision or sudden loss of vision, dry eyes, HA, temporal artery pain  ENT:    Denies oral/nasal ulcers, recurrent sinus infections, dry mouth  RESPIRATORY:  No pleuritic pain, history of pleural effusions, hemoptysis, exertional dyspnea  CARDIOVASCULAR:  Denies chest pain, history of pericardial effusions  GASTRO:   Denies heartburn, esophageal dysmotility, abdominal pain, nausea, vomiting, diarrhea, blood in the stool  HEMATOLOGIC:  No easy bruising, purpura, swollen lymph nodes  SKIN:    Denies alopecia, ulcers, nodules, sun sensitivity, unexplained persistent rash   VASCULAR:   Denies edema, cyanosis, raynaud phenomenon  NEUROLOGIC: Denies specific muscle weakness   PSYCHIATRIC:  No sleep disturbance / snoring, depression, anxiety  MSK:    No morning stiffness >1 hour, SI joint pain, persistent joint swelling, persistent joint pain    PAST MEDICAL HISTORY  Reviewed with patient, significant changes in medical history - no     PHYSICAL EXAM  Blood pressure 153/82, pulse 85, temperature 97.8 °F (36.6 °C), temperature source Oral, resp. rate 18, height 5' 4\" (1.626 m), weight 212 lb 6.4 oz (96.3 kg), last menstrual period 11/05/2013, SpO2 98 %. GENERAL APPEARANCE: Well-nourished, no acute distress  EYES: No scleral erythema, conjunctival injection  ENT: No oral ulcer, parotid enlargement  NECK: No adenopathy, thyroid enlargement  CARDIOVASCULAR: Heart rhythm is regular. No murmur, rub, gallop  CHEST: Normal vesicular breath sounds. No wheezes, rales, pleural friction rubs  ABDOMINAL: The abdomen is soft and nontender. Bowel sounds are normal  EXTREMITIES: There is no evidence of clubbing, cyanosis, edema  SKIN: No rash, palpable purpura, digital ulcer, abnormal thickening, normal nailfold capillaries   NEUROLOGICAL: Normal gait and station, full strength in upper and lower extremities,  normal sensation to light touch  MUSCULOSKELETAL:   Upper extremities - full range of motion, no tenderness, no swelling, no synovial thickening and no deformity of joints  Lower extremities - full range of motion, no tenderness, no swelling, no synovial thickening and no deformity of joints      LABS, RADIOLOGY AND PROCEDURES   Previous labs reviewed -Yes     ASSESSMENT  1. Sjogren's - The patient has been doing well. I recommend she continue using symptomatic treatment for dry eyes and dry mouth. 2. Back pain and myofascial pain syndrome - The patient should continue doing stretches and exercise at home. 3. Shoulder pain and arm/hand numbness -  The patient has been having pain in her shoulders with activity.  I suspect that she may have injured her rotator cuff. I will check an x-ray of her left shoulder today. I have recommended exercises for this. I have also ordered an EMG of her left upper extremity to look for a cause of her numbness. PLAN  1. Continue doing daily exercise regimen of stretching or aerobic exercise  2. As needed treatment for dry eyes and dry mouth. 3. X-rays of left shoulder to look for inflammatory/erosive changes   4. Order EMG of left Marc KENNEY. Leticia Escalona MD  Adult and Pediatric Rheumatology     [facility] [address], Phone [phone], Fax [phone]     Visiting  of Pediatrics    Department of Pediatrics, [facility] of [address] [place] [address], [address] [place] [address], Phone [phone], Fax [phone]    There are no Patient Instructions on file for this visit. cc:  Belkis Morin MD    Written by camacho Limon, as dictated by Jaquelin Gregg.  Leticia Escalona M.D.

## 2017-05-08 NOTE — MR AVS SNAPSHOT
Visit Information Date & Time Provider Department Dept. Phone Encounter #  
 5/8/2017 10:30 AM Sirena Buitrago MD [facility] [id number] Follow-up Instructions Return if symptoms worsen or fail to improve. Your Appointments 5/10/2017  1:30 PM  
ROUTINE CARE with Dalia Gomez MD  
[address]) Appt Note: follow up [address] [facility] [address]  
[phone]  
  
   
 [address] [address]  
  
    
 5/10/2017  2:45 PM  
ROUTINE CARE with Dalia Gomez MD  
[address]) Appt Note: shoulder and neck pain nj 5/1/17  
 [address] [facility]  
[phone] Upcoming Health Maintenance Date Due Pneumococcal 19-64 Medium Risk (1 of 1 - PPSV23) 3/14/1987 INFLUENZA AGE 9 TO ADULT 8/1/2017 PAP AKA CERVICAL CYTOLOGY 3/10/2020 DTaP/Tdap/Td series (2 - Td) 1/25/2027 Allergies as of 5/8/2017  Review Complete On: 5/8/2017 By: Sirena Buitrago MD  
  
 Severity Noted Reaction Type Reactions Monsel's [Ferric Subsulfate]  11/18/2013    Swelling Extreme burning, skin sloughing Other Plant, Animal, Environmental  05/19/2014    Unknown (comments) Vinegar Premarin [Conjugated Estrogens]  11/25/2013   Topical Itching Current Immunizations  Reviewed on 10/8/2012 Name Date Influenza Vaccine (Quad) PF 12/9/2015, 11/26/2014 Influenza Vaccine Split 10/8/2012 Not reviewed this visit You Were Diagnosed With   
  
 Codes Comments Chronic left shoulder pain    -  Primary ICD-10-CM: M25.512, S31.89 ICD-9-CM: 719.41, 338.29 Sjogren's syndrome, with unspecified organ involvement     ICD-10-CM: M35.00 ICD-9-CM: 710.2 Idiopathic progressive neuropathy     ICD-10-CM: G60.3 ICD-9-CM: 284. 4 Vitals BP Pulse Temp Resp Height(growth percentile) Weight(growth percentile) 153/82 (BP 1 Location: Right arm, BP Patient Position: Sitting) 85 97.8 °F (36.6 °C) (Oral) 18 5' 4\" (1.626 m) 212 lb 6.4 oz (96.3 kg) LMP SpO2 BMI OB Status Smoking Status 11/05/2013 98% 36.46 kg/m2 Hysterectomy Never Smoker Vitals History BMI and BSA Data Body Mass Index Body Surface Area  
 36.46 kg/m 2 2.09 m 2 Preferred Pharmacy Pharmacy Name Phone [address] [facility] [phone] Your Updated Medication List  
  
   
This list is accurate as of: 5/8/17 11:25 AM.  Always use your most recent med list.  
  
  
  
  
 albuterol 90 mcg/actuation inhaler Commonly known as:  VENTOLIN HFA Take 2 Puffs by inhalation every four (4) hours as needed for Wheezing. * VITAMIN D2 50,000 unit capsule Generic drug:  ergocalciferol * ergocalciferol (vitamin d2) 50,000 unit Tab Take 50,000 Units by mouth. Indications: VITAMIN D DEFICIENCY  
  
 estradiol 0.1 mg/24 hr  
Commonly known as:  VIVELLE  
APPLY 1 PATCH TO SKIN EVERY MONDAY AND THURSDAY  
  
 losartan-hydroCHLOROthiazide 100-25 mg per tablet Commonly known as:  HYZAAR  
TAKE 1 TABLET DAILY  
  
 montelukast 10 mg tablet Commonly known as:  SINGULAIR  
TAKE 1 TABLET DAILY  
  
 multivitamin tablet Commonly known as:  ONE A DAY Take 1 Tab by mouth daily. omeprazole 20 mg capsule Commonly known as:  PRILOSEC Take 20 mg by mouth daily. SAVELLA 25 mg tablet Generic drug:  Milnacipran TAKE 1 TABLET TWICE A DAY  
  
 SYNTHROID 137 mcg tablet Generic drug:  levothyroxine Take 137 mcg by mouth Daily (before breakfast). tiZANidine 4 mg capsule Commonly known as:  Christopher Cambridge Take 1 at bedtime. Indications: MUSCLE SPASM  
  
 traMADol 50 mg tablet Commonly known as:  ULTRAM  
Take 1 Tab by mouth every eight (8) hours as needed for Pain. TYLENOL EXTRA STRENGTH 500 mg tablet Generic drug:  acetaminophen Take 1,000 mg by mouth every six (6) hours as needed for Pain. VALTREX 500 mg tablet Generic drug:  valACYclovir Take 500 mg by mouth daily. * Notice: This list has 2 medication(s) that are the same as other medications prescribed for you. Read the directions carefully, and ask your doctor or other care provider to review them with you. Follow-up Instructions Return if symptoms worsen or fail to improve. To-Do List   
 05/08/2017 Imaging:  XR SHOULDER LT AP/LAT MIN 2 V   
  
 05/22/2017 Neurology:  [facility] LIMITED Introducing [facility]! Dear Fernanda Peace: 
Thank you for requesting a [employer] account. Our records indicate that you already have an active [employer] account. You can access your account anytime at [email or web address]. [employer] Did you know that you can access your hospital and ER discharge instructions at any time in [employer]? You can also review all of your test results from your hospital stay or ER visit. Additional Information If you have questions, please visit the Frequently Asked Questions section of the Skip[employer] website at [email or web address]/. Remember, [employer] is NOT to be used for urgent needs. For medical emergencies, dial 911. Now available from your iPhone and Android! Please provide this summary of care documentation to your next provider. Your primary care clinician is listed as Kermit Strauss. If you have any questions after today's visit, please call [phone].

## 2017-05-10 ENCOUNTER — OFFICE VISIT (OUTPATIENT)
Dept: FAMILY MEDICINE CLINIC | Age: 47
End: 2017-05-10

## 2017-05-10 VITALS
HEIGHT: 64 IN | SYSTOLIC BLOOD PRESSURE: 125 MMHG | WEIGHT: 210.4 LBS | BODY MASS INDEX: 35.92 KG/M2 | HEART RATE: 90 BPM | RESPIRATION RATE: 16 BRPM | TEMPERATURE: 98.4 F | OXYGEN SATURATION: 100 % | DIASTOLIC BLOOD PRESSURE: 70 MMHG

## 2017-05-10 DIAGNOSIS — M62.838 MUSCLE SPASM: Primary | ICD-10-CM

## 2017-05-10 DIAGNOSIS — J45.20 MILD INTERMITTENT ASTHMA WITHOUT COMPLICATION: ICD-10-CM

## 2017-05-10 DIAGNOSIS — M79.7 FIBROMYALGIA: ICD-10-CM

## 2017-05-10 DIAGNOSIS — I10 ESSENTIAL HYPERTENSION, BENIGN: ICD-10-CM

## 2017-05-10 DIAGNOSIS — T63.441S BEE STING-INDUCED ANAPHYLAXIS, ACCIDENTAL OR UNINTENTIONAL, SEQUELA: ICD-10-CM

## 2017-05-10 DIAGNOSIS — Z23 NEED FOR STREPTOCOCCUS PNEUMONIAE VACCINATION: ICD-10-CM

## 2017-05-10 RX ORDER — EPINEPHRINE 0.3 MG/.3ML
0.3 INJECTION SUBCUTANEOUS
Qty: 2 SYRINGE | Refills: 3 | Status: SHIPPED | COMMUNITY
Start: 2017-05-10 | End: 2020-07-20 | Stop reason: SDUPTHER

## 2017-05-10 RX ORDER — MONTELUKAST SODIUM 10 MG/1
TABLET ORAL
Qty: 90 TAB | Refills: 0 | Status: SHIPPED | COMMUNITY
Start: 2017-05-10 | End: 2017-08-09 | Stop reason: SDUPTHER

## 2017-05-10 RX ORDER — METAXALONE 800 MG/1
800 TABLET ORAL 3 TIMES DAILY
Qty: 270 TAB | Refills: 1 | Status: SHIPPED | COMMUNITY
Start: 2017-05-10 | End: 2018-01-12 | Stop reason: SDUPTHER

## 2017-05-10 RX ORDER — LOSARTAN POTASSIUM AND HYDROCHLOROTHIAZIDE 25; 100 MG/1; MG/1
TABLET ORAL
Qty: 90 TAB | Refills: 0 | Status: SHIPPED | OUTPATIENT
Start: 2017-05-10 | End: 2017-09-19 | Stop reason: SDUPTHER

## 2017-05-10 RX ORDER — ALBUTEROL SULFATE 90 UG/1
2 AEROSOL, METERED RESPIRATORY (INHALATION)
Qty: 3 INHALER | Refills: 1 | Status: SHIPPED | COMMUNITY
Start: 2017-05-10 | End: 2020-01-13 | Stop reason: SDUPTHER

## 2017-05-10 NOTE — PROGRESS NOTES
Chief Complaint   Patient presents with    Medication Refill     checking on Tramadol refills and requesting Skelaxin instead Tizanidine     \"REVIEWED RECORD IN PREPARATION FOR VISIT AND HAVE OBTAINED THE NECESSARY DOCUMENTATION\"  1. Have you been to the ER, urgent care clinic since your last visit? Hospitalized since your last visit? No    2. Have you seen or consulted any other health care providers outside of the [facility] since your last visit? Include any pap smears or colon screening. No  Patient does not have advanced directives.

## 2017-05-10 NOTE — PROGRESS NOTES
HISTORY OF PRESENT ILLNESS  Sang Gee is a 50 y.o. female. HPI  FU fibromyalgia and asthma. Need med refills. She also would like to switch back to Skelaxin from Tizanidine because of insomnia side effect. Pt has seen Dr. Harsh Pollard this week and is awaiting ANSARI for possible carpal tunnel syndrome. She has seen Dr. Ayo Chavez in January for hypothyroidism and Vit D def. Labs done. Review of Systems   Musculoskeletal: Positive for joint pain and myalgias. All other systems reviewed and are negative. Visit Vitals    /70 (BP 1 Location: Left arm, BP Patient Position: Sitting)    Pulse 90    Temp 98.4 °F (36.9 °C) (Oral)    Resp 16    Ht 5' 4\" (1.626 m)    Wt 210 lb 6.4 oz (95.4 kg)    LMP 11/05/2013    SpO2 100%    BMI 36.12 kg/m2       Physical Exam   Constitutional: She appears well-developed and well-nourished. Cardiovascular: Normal rate, regular rhythm and normal heart sounds. Pulmonary/Chest: Effort normal and breath sounds normal.   Vitals reviewed. ASSESSMENT and PLAN    ICD-10-CM ICD-9-CM    1. Muscle spasm M62.838 728.85 metaxalone (SKELAXIN) 800 mg tablet   2. Fibromyalgia M79.7 729.1 Milnacipran (SAVELLA) 25 mg tablet   3. Bee sting-induced anaphylaxis, accidental or unintentional, sequela T63.441S 909.1 EPINEPHrine (EPIPEN) 0.3 mg/0.3 mL injection     E929.2    4. Mild intermittent asthma without complication I05.38 502.98 montelukast (SINGULAIR) 10 mg tablet      albuterol (VENTOLIN HFA) 90 mcg/actuation inhaler   5. Need for Streptococcus pneumoniae vaccination Z23 V03.82 pneumococcal 13 delores conj dip (PREVNAR-13) 0.5 mL syrg injection   6. Essential hypertension, benign I10 401.1 losartan-hydroCHLOROthiazide (HYZAAR) 100-25 mg per tablet       Med refills sent to mail order. Order Prevnar 13 vaccine.

## 2017-05-10 NOTE — MR AVS SNAPSHOT
Visit Information Date & Time Provider Department Dept. Phone Encounter #  
 6/14/7899  3:54 PM Nasima Reynaga Sheldon Dario [phone] [id number] Upcoming Health Maintenance Date Due Pneumococcal 19-64 Medium Risk (1 of 1 - PPSV23) 3/14/1987 INFLUENZA AGE 9 TO ADULT 8/1/2017 PAP AKA CERVICAL CYTOLOGY 3/10/2020 DTaP/Tdap/Td series (2 - Td) 1/25/2027 Allergies as of 5/10/2017  Review Complete On: 1/97/2391 By: Nasima Reynaga MD  
  
 Severity Noted Reaction Type Reactions Monsel's [Ferric Subsulfate]  11/18/2013    Swelling Extreme burning, skin sloughing Other Plant, Animal, Environmental  05/19/2014    Unknown (comments) Vinegar Premarin [Conjugated Estrogens]  11/25/2013   Topical Itching Current Immunizations  Reviewed on 5/10/2017 Name Date Influenza Vaccine (Quad) PF 12/9/2015, 11/26/2014 Influenza Vaccine Split 10/8/2012 Reviewed by Nasima Reynaga MD on 5/10/2017 at  1:57 PM  
You Were Diagnosed With   
  
 Codes Comments Muscle spasm    -  Primary ICD-10-CM: S68.368 ICD-9-CM: 728.85 Fibromyalgia     ICD-10-CM: M79.7 ICD-9-CM: 729.1 Bee sting-induced anaphylaxis, accidental or unintentional, sequela     ICD-10-CM: T63.441S ICD-9-CM: 909.1, E929.2 Mild intermittent asthma without complication     [facility]: J45.20 ICD-9-CM: 493.90 Need for Streptococcus pneumoniae vaccination     ICD-10-CM: R12 ICD-9-CM: V03.82 Essential hypertension, benign     ICD-10-CM: I10 
ICD-9-CM: 401.1 Vitals BP Pulse Temp Resp Height(growth percentile) Weight(growth percentile) 125/70 (BP 1 Location: Left arm, BP Patient Position: Sitting) 90 98.4 °F (36.9 °C) (Oral) 16 5' 4\" (1.626 m) 210 lb 6.4 oz (95.4 kg) LMP SpO2 BMI OB Status Smoking Status 11/05/2013 100% 36.12 kg/m2 Hysterectomy Never Smoker BMI and BSA Data  Body Mass Index Body Surface Area  
 36.12 kg/m 2 2.08 m 2  
  
  
 Preferred Pharmacy Pharmacy Name Phone 100 [facility] [phone] Your Updated Medication List  
  
   
This list is accurate as of: 5/10/17  2:02 PM.  Always use your most recent med list.  
  
  
  
  
 albuterol 90 mcg/actuation inhaler Commonly known as:  VENTOLIN HFA Take 2 Puffs by inhalation every four (4) hours as needed for Wheezing. EPINEPHrine 0.3 mg/0.3 mL injection Commonly known as:  EPIPEN  
0.3 mL by IntraMUSCular route once as needed for up to 2 doses. * VITAMIN D2 50,000 unit capsule Generic drug:  ergocalciferol * ergocalciferol (vitamin d2) 50,000 unit Tab Take 50,000 Units by mouth. Indications: VITAMIN D DEFICIENCY  
  
 estradiol 0.1 mg/24 hr  
Commonly known as:  VIVELLE  
APPLY 1 PATCH TO SKIN EVERY MONDAY AND THURSDAY  
  
 losartan-hydroCHLOROthiazide 100-25 mg per tablet Commonly known as:  HYZAAR  
TAKE 1 TABLET DAILY  
  
 metaxalone 800 mg tablet Commonly known as:  SKELAXIN Take 1 Tab by mouth three (3) times daily. Milnacipran 25 mg tablet Commonly known as:  Joe Pippins TAKE 1 TABLET TWICE A DAY  
  
 montelukast 10 mg tablet Commonly known as:  SINGULAIR  
TAKE 1 TABLET DAILY  
  
 multivitamin tablet Commonly known as:  ONE A DAY Take 1 Tab by mouth daily. omeprazole 20 mg capsule Commonly known as:  PRILOSEC Take 20 mg by mouth daily. pneumococcal 13 delores conj dip 0.5 mL Syrg injection Commonly known as:  PREVNAR-13  
0.5 mL by IntraMUSCular route once for 1 dose. SYNTHROID 137 mcg tablet Generic drug:  levothyroxine Take 137 mcg by mouth Daily (before breakfast). traMADol 50 mg tablet Commonly known as:  ULTRAM  
Take 1 Tab by mouth every eight (8) hours as needed for Pain. TYLENOL EXTRA STRENGTH 500 mg tablet Generic drug:  acetaminophen Take 1,000 mg by mouth every six (6) hours as needed for Pain. VALTREX 500 mg tablet Generic drug:  valACYclovir Take 500 mg by mouth daily. * Notice: This list has 2 medication(s) that are the same as other medications prescribed for you. Read the directions carefully, and ask your doctor or other care provider to review them with you. Prescriptions Printed Refills  
 pneumococcal 13 delores conj dip (PREVNAR-13) 0.5 mL syrg injection 0 Si.5 mL by IntraMUSCular route once for 1 dose. Class: Print Route: IntraMUSCular Prescriptions Sent to Mail Order Refills  
 metaxalone (SKELAXIN) 800 mg tablet 1 Sig: Take 1 Tab by mouth three (3) times daily. Class: Mail Order Pharmacy: [address], [address] Ph #: [phone] Route: Oral  
 montelukast (SINGULAIR) 10 mg tablet 0 Sig: TAKE 1 TABLET DAILY Class: Mail Order Pharmacy: [address], [address] Ph #: [phone] EPINEPHrine (EPIPEN) 0.3 mg/0.3 mL injection 3 Si.3 mL by IntraMUSCular route once as needed for up to 2 doses. Class: Mail Order Pharmacy: [address], [address] Ph #: [phone] Route: IntraMUSCular Milnacipran (SAVELLA) 25 mg tablet 1 Sig: TAKE 1 TABLET TWICE A DAY Class: Mail Order Pharmacy: [address], [address] Ph #: [phone]  
 albuterol (VENTOLIN HFA) 90 mcg/actuation inhaler 1 Sig: Take 2 Puffs by inhalation every four (4) hours as needed for Wheezing. Class: Mail Order Pharmacy: [address], [address] Ph #: [phone] Route: Inhalation Prescriptions Sent to Pharmacy Refills  
 losartan-hydroCHLOROthiazide (HYZAAR) 100-25 mg per tablet 0 Sig: TAKE 1 TABLET DAILY  Class: Normal  
 Pharmacy: [address], [address] [place] [address] #: [phone] Introducing [facility]! Dear Sybil Nguyen: 
Thank you for requesting a [employer] account. Our records indicate that you already have an active [employer] account. You can access your account anytime at [email or web address] Did you know that you can access your hospital and ER discharge instructions at any time in [employer]? You can also review all of your test results from your hospital stay or ER visit. Additional Information If you have questions, please visit the Frequently Asked Questions section of the [employer] website at [email or web address] Remember, [employer] is NOT to be used for urgent needs. For medical emergencies, dial 911. Now available from your iPhone and Android! Please provide this summary of care documentation to your next provider. Your primary care clinician is listed as Azra Ortez. If you have any questions after today's visit, please call [phone].

## 2017-06-16 ENCOUNTER — TELEPHONE (OUTPATIENT)
Dept: RHEUMATOLOGY | Age: 47
End: 2017-06-16

## 2017-06-16 NOTE — TELEPHONE ENCOUNTER
Patient called would like to know results from the shoulder x-ray that was done on 5/8/17. She is aware that the EMG was normal but continues to experience pain in her left shoulder & clavicle area. She currently is doing her daily stretching and uses Tramadol and Tylenol as needed which she states really does not provide much relief. She was told she may need an MRI of the chest.  She would like to know what your next recommendation would be.

## 2017-06-20 NOTE — TELEPHONE ENCOUNTER
Called patient no answer, LM/Vm to call office in regards to below message. It shows osteoarthritis.  The recommendation is ROM exercises at home or with PT.

## 2017-06-27 DIAGNOSIS — M35.00 SJOGREN'S SYNDROME, WITH UNSPECIFIED ORGAN INVOLVEMENT (HCC): ICD-10-CM

## 2017-06-27 DIAGNOSIS — G60.3 IDIOPATHIC PROGRESSIVE NEUROPATHY: ICD-10-CM

## 2017-06-27 DIAGNOSIS — G89.29 CHRONIC LEFT SHOULDER PAIN: ICD-10-CM

## 2017-06-27 DIAGNOSIS — M25.512 CHRONIC LEFT SHOULDER PAIN: ICD-10-CM

## 2017-06-30 DIAGNOSIS — M79.7 FIBROMYALGIA: ICD-10-CM

## 2017-07-03 RX ORDER — TRAMADOL HYDROCHLORIDE 50 MG/1
50 TABLET ORAL
Qty: 270 TAB | Refills: 1 | OUTPATIENT
Start: 2017-07-03

## 2017-07-03 NOTE — TELEPHONE ENCOUNTER
From: Jessica De La Rosa  To: Judge Gerardo MD  Sent: 6/30/2017 7:29 PM EDT  Subject: Medication Renewal Request    Original authorizing provider: MD Jessica Camilo would like a refill of the following medications:  traMADol (ULTRAM) 50 mg tablet Judge Gerardo MD]    Preferred pharmacy: [facility]:  You asked me during our previous visit to request my TraMADol refill by email since I had seen you recently. Can you fax this prescription to [facility]?  Thank you, Jessica De La Rosa

## 2017-07-28 DIAGNOSIS — M79.7 FIBROMYALGIA: ICD-10-CM

## 2017-07-28 RX ORDER — TRAMADOL HYDROCHLORIDE 50 MG/1
50 TABLET ORAL
Qty: 270 TAB | Refills: 1 | Status: SHIPPED | OUTPATIENT
Start: 2017-07-28 | End: 2018-01-12 | Stop reason: SDUPTHER

## 2017-07-28 NOTE — TELEPHONE ENCOUNTER
From: Shahriar Corbett  To: Fela Hsu MD  Sent: 7/28/2017 4:02 PM EDT  Subject: Medication Renewal Request    Original authorizing provider: MD Shahriar Durant would like a refill of the following medications:  traMADol (ULTRAM) 50 mg tablet Fela Hsu MD]    Preferred pharmacy: [facility]:   You asked me during our previous visit to request my TraMADol refill by email since I had seen you recently. I requested before on the refill date and it was denied. I assume I need to request on the 6 month from written date so I am doing that now. Can you fax this prescription to [facility]?  Thank you, Shahriar Corbett

## 2017-08-09 DIAGNOSIS — J45.20 MILD INTERMITTENT ASTHMA WITHOUT COMPLICATION: ICD-10-CM

## 2017-08-09 RX ORDER — MONTELUKAST SODIUM 10 MG/1
TABLET ORAL
Qty: 90 TAB | Refills: 1 | Status: SHIPPED | OUTPATIENT
Start: 2017-08-09 | End: 2018-01-12 | Stop reason: SDUPTHER

## 2017-09-19 DIAGNOSIS — I10 ESSENTIAL HYPERTENSION, BENIGN: ICD-10-CM

## 2017-09-19 RX ORDER — LOSARTAN POTASSIUM AND HYDROCHLOROTHIAZIDE 25; 100 MG/1; MG/1
TABLET ORAL
Qty: 90 TAB | Refills: 0 | Status: SHIPPED | OUTPATIENT
Start: 2017-09-19 | End: 2017-12-18 | Stop reason: SDUPTHER

## 2017-10-02 ENCOUNTER — CLINICAL SUPPORT (OUTPATIENT)
Dept: FAMILY MEDICINE CLINIC | Age: 47
End: 2017-10-02

## 2017-10-02 DIAGNOSIS — Z23 ENCOUNTER FOR IMMUNIZATION: Primary | ICD-10-CM

## 2017-10-02 NOTE — PROGRESS NOTES
Clayton Moulton is a 50 y.o. female who presents for routine immunizations. She denies any symptoms , reactions or allergies that would exclude them from being immunized today. Risks and adverse reactions were discussed and the VIS was given to them. All questions were addressed. She was observed for 10 min post injection. There were no reactions observed.     Gunner De León LPN

## 2017-10-02 NOTE — PATIENT INSTRUCTIONS
Vaccine Information Statement    Influenza (Flu) Vaccine (Inactivated or Recombinant): What you need to know    Many Vaccine Information Statements are available in Georgian and other languages. See www.immunize.org/vis  Hojas de Información Sobre Vacunas están disponibles en Español y en muchos otros idiomas. Visite www.immunize.org/vis    1. Why get vaccinated? Influenza (flu) is a contagious disease that spreads around the [place] every year, usually between October and May. Flu is caused by influenza viruses, and is spread mainly by coughing, sneezing, and close contact. Anyone can get flu. Flu strikes suddenly and can last several days. Symptoms vary by age, but can include:   fever/chills   sore throat   muscle aches   fatigue   cough   headache    runny or stuffy nose    Flu can also lead to pneumonia and blood infections, and cause diarrhea and seizures in children. If you have a medical condition, such as heart or lung disease, flu can make it worse. Flu is more dangerous for some people. Infants and young children, people 72years of age and older, pregnant women, and people with certain health conditions or a weakened immune system are at greatest risk. Each year thousands of people in the [facility] die from flu, and many more are hospitalized. Flu vaccine can:   keep you from getting flu,   make flu less severe if you do get it, and   keep you from spreading flu to your family and other people. 2. Inactivated and recombinant flu vaccines    A dose of flu vaccine is recommended every flu season. Children 6 months through 6years of age may need two doses during the same flu season. Everyone else needs only one dose each flu season.        Some inactivated flu vaccines contain a very small amount of a mercury-based preservative called thimerosal. Studies have not shown thimerosal in vaccines to be harmful, but flu vaccines that do not contain thimerosal are available. There is no live flu virus in flu shots. They cannot cause the flu. There are many flu viruses, and they are always changing. Each year a new flu vaccine is made to protect against three or four viruses that are likely to cause disease in the upcoming flu season. But even when the vaccine doesnt exactly match these viruses, it may still provide some protection    Flu vaccine cannot prevent:   flu that is caused by a virus not covered by the vaccine, or   illnesses that look like flu but are not. It takes about 2 weeks for protection to develop after vaccination, and protection lasts through the flu season. 3. Some people should not get this vaccine    Tell the person who is giving you the vaccine:     If you have any severe, life-threatening allergies. If you ever had a life-threatening allergic reaction after a dose of flu vaccine, or have a severe allergy to any part of this vaccine, you may be advised not to get vaccinated. Most, but not all, types of flu vaccine contain a small amount of egg protein.  If you ever had Guillain-Barré Syndrome (also called GBS). Some people with a history of GBS should not get this vaccine. This should be discussed with your doctor.  If you are not feeling well. It is usually okay to get flu vaccine when you have a mild illness, but you might be asked to come back when you feel better. 4. Risks of a vaccine reaction    With any medicine, including vaccines, there is a chance of reactions. These are usually mild and go away on their own, but serious reactions are also possible. Most people who get a flu shot do not have any problems with it.      Minor problems following a flu shot include:    soreness, redness, or swelling where the shot was given     hoarseness   sore, red or itchy eyes   cough   fever   aches   headache   itching   fatigue  If these problems occur, they usually begin soon after the shot and last 1 or 2 days. More serious problems following a flu shot can include the following:     There may be a small increased risk of Guillain-Barré Syndrome (GBS) after inactivated flu vaccine. This risk has been estimated at 1 or 2 additional cases per million people vaccinated. This is much lower than the risk of severe complications from flu, which can be prevented by flu vaccine.  Young children who get the flu shot along with pneumococcal vaccine (PCV13) and/or DTaP vaccine at the same time might be slightly more likely to have a seizure caused by fever. Ask your doctor for more information. Tell your doctor if a child who is getting flu vaccine has ever had a seizure. Problems that could happen after any injected vaccine:      People sometimes faint after a medical procedure, including vaccination. Sitting or lying down for about 15 minutes can help prevent fainting, and injuries caused by a fall. Tell your doctor if you feel dizzy, or have vision changes or ringing in the ears.  Some people get severe pain in the shoulder and have difficulty moving the arm where a shot was given. This happens very rarely.  Any medication can cause a severe allergic reaction. Such reactions from a vaccine are very rare, estimated at about 1 in a million doses, and would happen within a few minutes to a few hours after the vaccination. As with any medicine, there is a very remote chance of a vaccine causing a serious injury or death. The safety of vaccines is always being monitored. For more information, visit: www.cdc.gov/vaccinesafety/    5. What if there is a serious reaction? What should I look for?  Look for anything that concerns you, such as signs of a severe allergic reaction, very high fever, or unusual behavior.     Signs of a severe allergic reaction can include hives, swelling of the face and throat, difficulty breathing, a fast heartbeat, dizziness, and weakness - usually within a few minutes to a few hours after the vaccination. What should I do?  If you think it is a severe allergic reaction or other emergency that cant wait, call 9-1-1 and get the person to the nearest hospital. Otherwise, call your doctor.  Reactions should be reported to the Vaccine Adverse Event Reporting System (VAERS). Your doctor should file this report, or you can do it yourself through  the VAERS web site at www.vaers. [facility].gov, or by calling 7-582.118.5222. VAERS does not give medical advice. 6. The National Vaccine Injury Compensation Program    The [facility] Vaccine Injury Compensation Program (VICP) is a federal program that was created to compensate people who may have been injured by certain vaccines. Persons who believe they may have been injured by a vaccine can learn about the program and about filing a claim by calling 8-195.516.1376 or visiting the vushaper website at www.[facility].gov/vaccinecompensation. There is a time limit to file a claim for compensation. 7. How can I learn more?  Ask your healthcare provider. He or she can give you the vaccine package insert or suggest other sources of information.  Call your local or state health department.  Contact the Centers for Disease Control and Prevention (CDC):  - Call 2-484.992.3420 (1-800-CDC-INFO) or  - Visit CDCs website at www.cdc.gov/flu    Vaccine Information Statement   Inactivated Influenza Vaccine   8/7/2015  [address] 531HC-66    Department of Health and Human Services  Centers for Disease Control and Prevention    Office Use Only

## 2017-12-18 DIAGNOSIS — I10 ESSENTIAL HYPERTENSION, BENIGN: ICD-10-CM

## 2017-12-18 RX ORDER — LOSARTAN POTASSIUM AND HYDROCHLOROTHIAZIDE 25; 100 MG/1; MG/1
TABLET ORAL
Qty: 90 TAB | Refills: 0 | Status: SHIPPED | OUTPATIENT
Start: 2017-12-18 | End: 2018-01-12 | Stop reason: SDUPTHER

## 2018-01-12 ENCOUNTER — OFFICE VISIT (OUTPATIENT)
Dept: FAMILY MEDICINE CLINIC | Age: 48
End: 2018-01-12

## 2018-01-12 VITALS
HEART RATE: 90 BPM | BODY MASS INDEX: 36.29 KG/M2 | WEIGHT: 212.6 LBS | OXYGEN SATURATION: 97 % | RESPIRATION RATE: 20 BRPM | TEMPERATURE: 97.8 F | HEIGHT: 64 IN | DIASTOLIC BLOOD PRESSURE: 74 MMHG | SYSTOLIC BLOOD PRESSURE: 114 MMHG

## 2018-01-12 DIAGNOSIS — M62.838 MUSCLE SPASM: ICD-10-CM

## 2018-01-12 DIAGNOSIS — M54.16 LUMBAR RADICULOPATHY: ICD-10-CM

## 2018-01-12 DIAGNOSIS — N63.22 BREAST LUMP ON LEFT SIDE AT 10 O'CLOCK POSITION: ICD-10-CM

## 2018-01-12 DIAGNOSIS — M79.605 PAIN OF LEFT LOWER EXTREMITY: ICD-10-CM

## 2018-01-12 DIAGNOSIS — Z79.899 ENCOUNTER FOR LONG-TERM CURRENT USE OF MEDICATION: ICD-10-CM

## 2018-01-12 DIAGNOSIS — E78.00 HYPERCHOLESTEROLEMIA: ICD-10-CM

## 2018-01-12 DIAGNOSIS — J45.20 MILD INTERMITTENT ASTHMA WITHOUT COMPLICATION: ICD-10-CM

## 2018-01-12 DIAGNOSIS — E03.9 ACQUIRED HYPOTHYROIDISM: ICD-10-CM

## 2018-01-12 DIAGNOSIS — M79.7 FIBROMYALGIA: ICD-10-CM

## 2018-01-12 DIAGNOSIS — I10 ESSENTIAL HYPERTENSION, BENIGN: Primary | ICD-10-CM

## 2018-01-12 RX ORDER — MONTELUKAST SODIUM 10 MG/1
TABLET ORAL
Qty: 90 TAB | Refills: 3 | Status: SHIPPED | OUTPATIENT
Start: 2018-01-12 | End: 2019-01-08 | Stop reason: SDUPTHER

## 2018-01-12 RX ORDER — TRAMADOL HYDROCHLORIDE 50 MG/1
50 TABLET ORAL
Qty: 270 TAB | Refills: 1 | Status: SHIPPED | OUTPATIENT
Start: 2018-01-12 | End: 2018-07-05 | Stop reason: SDUPTHER

## 2018-01-12 RX ORDER — METAXALONE 800 MG/1
800 TABLET ORAL 3 TIMES DAILY
Qty: 270 TAB | Refills: 1 | Status: SHIPPED | OUTPATIENT
Start: 2018-01-12 | End: 2018-07-05 | Stop reason: ALTCHOICE

## 2018-01-12 RX ORDER — LOSARTAN POTASSIUM AND HYDROCHLOROTHIAZIDE 25; 100 MG/1; MG/1
TABLET ORAL
Qty: 90 TAB | Refills: 3 | Status: SHIPPED | OUTPATIENT
Start: 2018-01-12 | End: 2019-02-18 | Stop reason: SDUPTHER

## 2018-01-12 RX ORDER — LEVOTHYROXINE SODIUM 137 UG/1
137 TABLET ORAL
Qty: 90 TAB | Refills: 1 | Status: SHIPPED | OUTPATIENT
Start: 2018-01-12 | End: 2018-01-14 | Stop reason: DRUGHIGH

## 2018-01-12 RX ORDER — PREDNISONE 10 MG/1
TABLET ORAL
Qty: 21 TAB | Refills: 0 | Status: SHIPPED | OUTPATIENT
Start: 2018-01-12 | End: 2018-07-05 | Stop reason: ALTCHOICE

## 2018-01-12 NOTE — MR AVS SNAPSHOT
Visit Information Date & Time Provider Department Dept. Phone Encounter #  
 2/56/2395  7:45 PM Sheldon Anand [phone] [id number] Upcoming Health Maintenance Date Due Pneumococcal 19-64 Medium Risk (1 of 1 - PPSV23) 3/14/1987 PAP AKA CERVICAL CYTOLOGY 3/10/2020 DTaP/Tdap/Td series (2 - Td) 1/25/2027 Allergies as of 1/12/2018  Review Complete On: 5/13/9392 By: Niya De Oliveira MD  
  
 Severity Noted Reaction Type Reactions Monsel's [Ferric Subsulfate]  11/18/2013    Swelling Extreme burning, skin sloughing Other Plant, Animal, Environmental  05/19/2014    Unknown (comments) Vinegar Premarin [Conjugated Estrogens]  11/25/2013   Topical Itching Current Immunizations  Reviewed on 5/10/2017 Name Date Influenza Vaccine (Quad) PF 10/2/2017, 12/9/2015, 11/26/2014 Influenza Vaccine Split 10/8/2012 Not reviewed this visit You Were Diagnosed With   
  
 Codes Comments Essential hypertension, benign    -  Primary ICD-10-CM: I10 
ICD-9-CM: 401.1 Breast lump on left side at 10 o'clock position     ICD-10-CM: N63.22 
ICD-9-CM: 611.72 Fibromyalgia     ICD-10-CM: M79.7 ICD-9-CM: 729.1 Muscle spasm     ICD-10-CM: D13.212 ICD-9-CM: 728.85 Hypercholesterolemia     ICD-10-CM: E78.00 ICD-9-CM: 272.0 Acquired hypothyroidism     ICD-10-CM: E03.9 ICD-9-CM: 244.9 Encounter for long-term current use of medication     ICD-10-CM: Z79.899 ICD-9-CM: V58.69 Lumbar radiculopathy     ICD-10-CM: M54.16 
ICD-9-CM: 724.4 Pain of left lower extremity     ICD-10-CM: M79.605 ICD-9-CM: 729.5 Mild intermittent asthma without complication     HAK-33-GX: J45.20 ICD-9-CM: 493.90 Vitals BP Pulse Temp Resp Height(growth percentile) Weight(growth percentile) 114/74 (BP 1 Location: Left arm, BP Patient Position: Sitting) 90 97.8 °F (36.6 °C) (Oral) 20 5' 4\" (1.626 m) 212 lb 9.6 oz (96.4 kg) LMP SpO2 BMI OB Status Smoking Status 11/05/2013 97% 36.49 kg/m2 Hysterectomy Never Smoker BMI and BSA Data Body Mass Index Body Surface Area  
 36.49 kg/m 2 2.09 m 2 Preferred Pharmacy Pharmacy Name Phone [address] [facility] [phone] Your Updated Medication List  
  
   
This list is accurate as of: 1/12/18  4:42 PM.  Always use your most recent med list.  
  
  
  
  
 albuterol 90 mcg/actuation inhaler Commonly known as:  VENTOLIN HFA Take 2 Puffs by inhalation every four (4) hours as needed for Wheezing. EPINEPHrine 0.3 mg/0.3 mL injection Commonly known as:  EPIPEN  
0.3 mL by IntraMUSCular route once as needed for up to 2 doses. ergocalciferol (vitamin d2) 50,000 unit Tab Take 50,000 Units by mouth. Indications: VITAMIN D DEFICIENCY  
  
 estradiol 0.1 mg/24 hr  
Commonly known as:  VIVELLE  
APPLY 1 PATCH TO SKIN EVERY MONDAY AND THURSDAY  
  
 losartan-hydroCHLOROthiazide 100-25 mg per tablet Commonly known as:  HYZAAR  
TAKE 1 TABLET DAILY  
  
 metaxalone 800 mg tablet Commonly known as:  SKELAXIN Take 1 Tab by mouth three (3) times daily. Milnacipran 25 mg tablet Commonly known as:  Gaile Nissen TAKE 1 TABLET TWICE A DAY  
  
 montelukast 10 mg tablet Commonly known as:  SINGULAIR  
TAKE 1 TABLET DAILY  
  
 multivitamin tablet Commonly known as:  ONE A DAY Take 1 Tab by mouth daily. omeprazole 20 mg capsule Commonly known as:  PRILOSEC Take 20 mg by mouth daily. predniSONE 10 mg dose pack Commonly known as:  STERAPRED DS See administration instruction per 10mg dose pack SYNTHROID 137 mcg tablet Generic drug:  levothyroxine Take 137 mcg by mouth Daily (before breakfast). traMADol 50 mg tablet Commonly known as:  ULTRAM  
Take 1 Tab by mouth every eight (8) hours as needed for Pain. TYLENOL EXTRA STRENGTH 500 mg tablet Generic drug:  acetaminophen Take 1,000 mg by mouth every six (6) hours as needed for Pain. VALTREX 500 mg tablet Generic drug:  valACYclovir Take 500 mg by mouth daily. Prescriptions Printed Refills  
 traMADol (ULTRAM) 50 mg tablet 1 Sig: Take 1 Tab by mouth every eight (8) hours as needed for Pain. Class: Print Route: Oral  
  
Prescriptions Sent to Pharmacy Refills SYNTHROID 137 mcg tablet 1 Sig: Take 137 mcg by mouth Daily (before breakfast). Class: Normal  
 Pharmacy: [address], [address] Ph #: [phone] Route: Oral  
 metaxalone (SKELAXIN) 800 mg tablet 1 Sig: Take 1 Tab by mouth three (3) times daily. Class: Normal  
 Pharmacy: [address], [address] Ph #: [phone] Route: Oral  
 predniSONE (STERAPRED DS) 10 mg dose pack 0 Sig: See administration instruction per 10mg dose pack Class: Normal  
 Pharmacy: [facility] [address] Ph #: [phone]  
 losartan-hydroCHLOROthiazide (HYZAAR) 100-25 mg per tablet 3 Sig: TAKE 1 TABLET DAILY Class: Normal  
 Pharmacy: [address], [address] Ph #: [phone] Milnacipran (SAVELLA) 25 mg tablet 3 Sig: TAKE 1 TABLET TWICE A DAY Class: Normal  
 Pharmacy: [address], [address] Ph #: [phone]  
 montelukast (SINGULAIR) 10 mg tablet 3 Sig: TAKE 1 TABLET DAILY Class: Normal  
 Pharmacy: [address], [address] Ph #: [phone] We Performed the Following CBC W/O DIFF [70003 CPT(R)] LIPID PANEL [33034 CPT(R)] METABOLIC PANEL, COMPREHENSIVE [07042 CPT(R)] T4, FREE [id number] CPT(R)] TSH 3RD GENERATION [36276 CPT(R)] [facility]! Dear Laurel Luther: Thank you for requesting a [employer] account. Our records indicate that you already have an active [employer] account. You can access your account anytime at [email or web address] Did you know that you can access your hospital and ER discharge instructions at any time in [employer]? You can also review all of your test results from your hospital stay or ER visit. Additional Information If you have questions, please visit the Frequently Asked Questions section of the [employer] website at [email or web address] Remember, [employer] is NOT to be used for urgent needs. For medical emergencies, dial 911. Now available from your iPhone and Android! Please provide this summary of care documentation to your next provider. Your primary care clinician is listed as Griselda Pereira. If you have any questions after today's visit, please call [phone].

## 2018-01-12 NOTE — PROGRESS NOTES
Identified pt with two pt identifiers(name and ). Chief Complaint   Patient presents with    Thyroid Problem    Back Pain     neck and shoulders    Leg Pain     left leg has been aching at night the last week     Medication Evaluation     rash from estradiol patch    Stress     her mom passed and her dog is very sick - but she is doing ok         Health Maintenance Due   Topic    Pneumococcal 19-64 Medium Risk (1 of 1 - PPSV23)       Wt Readings from Last 3 Encounters:   18 212 lb 9.6 oz (96.4 kg)   05/10/17 210 lb 6.4 oz (95.4 kg)   17 212 lb 6.4 oz (96.3 kg)     Temp Readings from Last 3 Encounters:   18 97.8 °F (36.6 °C) (Oral)   05/10/17 98.4 °F (36.9 °C) (Oral)   17 97.8 °F (36.6 °C) (Oral)     BP Readings from Last 3 Encounters:   18 114/74   05/10/17 125/70   17 153/82     Pulse Readings from Last 3 Encounters:   18 90   05/10/17 90   17 85         Learning Assessment:  :     Learning Assessment 2014   PRIMARY LEARNER Patient Patient Patient   HIGHEST LEVEL OF EDUCATION - PRIMARY LEARNER  - - SOME COLLEGE   BARRIERS PRIMARY LEARNER - - NONE   CO-LEARNER CAREGIVER - - No   PRIMARY LANGUAGE ENGLISH ENGLISH ENGLISH   LEARNER PREFERENCE PRIMARY DEMONSTRATION DEMONSTRATION DEMONSTRATION     - - OTHER (COMMENT)   ANSWERED BY patient patient self   RELATIONSHIP SELF SELF SELF       Depression Screening:  :     PHQ over the last two weeks 2018   Little interest or pleasure in doing things Several days   Feeling down, depressed or hopeless Several days   Total Score PHQ 2 2       Fall Risk Assessment:  :     Fall Risk Assessment, last 12 mths 2018   Able to walk? Yes   Fall in past 12 months? No       Abuse Screening:  :     Abuse Screening Questionnaire 2018   Do you ever feel afraid of your partner? N N N   Are you in a relationship with someone who physically or mentally threatens you?  N N N   Is it safe for you to go home? Y Y Y       Coordination of Care Questionnaire:  :     1) Have you been to an emergency room, urgent care clinic since your last visit? no   Hospitalized since your last visit? no             2) Have you seen or consulted any other health care providers outside of [address] [place] [address] since your last visit? no  (Include any pap smears or colon screenings in this section.)    3) Do you have an Advance Directive on file? no  Are you interested in receiving information about Advance Directives? no    Reviewed record in preparation for visit and have obtained necessary documentation. Medication reconciliation up to date and corrected with patient at this time.

## 2018-01-12 NOTE — LETTER
1/15/2018 8:53 AM 
 
Ms. Chau Chatman [phone] Dear Chau Chatman: Please find your most recent results below. Resulted Orders CBC W/O DIFF Result Value Ref Range WBC 8.8 3.4 - 10.8 x10E3/uL  
 RBC 4.43 3.77 - 5.28 x10E6/uL HGB 12.6 11.1 - 15.9 g/dL HCT 38.7 34.0 - 46.6 % MCV 87 79 - 97 fL  
 MCH 28.4 26.6 - 33.0 pg  
 MCHC 32.6 31.5 - 35.7 g/dL  
 RDW 14.5 12.3 - 15.4 % PLATELET 508 147 - 762 x10E3/uL Narrative Performed at:  [address]  [id number] : Lexy Ramirez MD, Phone:  [phone] METABOLIC PANEL, COMPREHENSIVE Result Value Ref Range Glucose 67 65 - 99 mg/dL Comment:  
   Specimen received in contact with cells. No visible hemolysis 
present. However GLUC may be decreased and K increased. Clinical 
correlation indicated. BUN 12 6 - 24 mg/dL Creatinine 0.73 0.57 - 1.00 mg/dL GFR est non-AA 97 >59 mL/min/1.73 GFR est  >59 mL/min/1.73  
 BUN/Creatinine ratio 16 9 - 23 Sodium 144 134 - 144 mmol/L Potassium 3.4 (L) 3.5 - 5.2 mmol/L Comment:  
   Specimen received in contact with cells. No visible hemolysis 
present. However GLUC may be decreased and K increased. Clinical 
correlation indicated. Chloride 96 96 - 106 mmol/L  
 CO2 29 18 - 29 mmol/L Calcium 9.9 8.7 - 10.2 mg/dL Protein, total 7.0 6.0 - 8.5 g/dL Albumin 4.4 3.5 - 5.5 g/dL GLOBULIN, TOTAL 2.6 1.5 - 4.5 g/dL A-G Ratio 1.7 1.2 - 2.2 Bilirubin, total 0.3 0.0 - 1.2 mg/dL Alk. phosphatase 87 39 - 117 IU/L  
 AST (SGOT) 23 0 - 40 IU/L  
 ALT (SGPT) 14 0 - 32 IU/L Narrative Performed at:  [address]  [id number] : Lexy Ramirez MD, Phone:  [phone] LIPID PANEL Result Value Ref Range Cholesterol, total 229 (H) 100 - 199 mg/dL Triglyceride 110 0 - 149 mg/dL HDL Cholesterol 71 >39 mg/dL VLDL, calculated 22 5 - 40 mg/dL LDL, calculated 136 (H) 0 - 99 mg/dL Narrative Performed at:  [address]  [id number] : Chantelle Sheppard MD, Phone:  [phone] T4, FREE Result Value Ref Range T4, Free 1.19 0.82 - 1.77 ng/dL Narrative Performed at:  [address]  [id number] : Chantelle Sheppard MD, Phone:  [phone] TSH 3RD GENERATION Result Value Ref Range TSH 7.390 (H) 0.450 - 4.500 uIU/mL Narrative Performed at:  [address]  [id number] : Chantelle Sheppard MD, Phone:  [phone] CVD REPORT Result Value Ref Range INTERPRETATION Note Comment:  
   Supplemental report is available. Narrative Performed at:  [address] [place] [address]  [id number] : Clinton Diane PhD, Phone:  [phone] RECOMMENDATIONS: 
Labs show low thyroid level.  Need to increase dose of Synthroid. I will send a new prescription to [facility]. Make sure you are taking this on empty stomach every morning. Will need repeat thyroid lab test in 3 months.  Potassium is a little low.  Try to eat good sources of potassium in diet. Cholesterol numbers are elevated. Follow low fat diet. Please call me if you have any questions: [phone] Sincerely, Morteza Andujar MD

## 2018-01-13 LAB
ALBUMIN SERPL-MCNC: 4.4 G/DL (ref 3.5–5.5)
ALBUMIN/GLOB SERPL: 1.7 {RATIO} (ref 1.2–2.2)
ALP SERPL-CCNC: 87 IU/L (ref 39–117)
ALT SERPL-CCNC: 14 IU/L (ref 0–32)
AST SERPL-CCNC: 23 IU/L (ref 0–40)
BILIRUB SERPL-MCNC: 0.3 MG/DL (ref 0–1.2)
BUN SERPL-MCNC: 12 MG/DL (ref 6–24)
BUN/CREAT SERPL: 16 (ref 9–23)
CALCIUM SERPL-MCNC: 9.9 MG/DL (ref 8.7–10.2)
CHLORIDE SERPL-SCNC: 96 MMOL/L (ref 96–106)
CHOLEST SERPL-MCNC: 229 MG/DL (ref 100–199)
CO2 SERPL-SCNC: 29 MMOL/L (ref 18–29)
CREAT SERPL-MCNC: 0.73 MG/DL (ref 0.57–1)
ERYTHROCYTE [DISTWIDTH] IN BLOOD BY AUTOMATED COUNT: 14.5 % (ref 12.3–15.4)
GLOBULIN SER CALC-MCNC: 2.6 G/DL (ref 1.5–4.5)
GLUCOSE SERPL-MCNC: 67 MG/DL (ref 65–99)
HCT VFR BLD AUTO: 38.7 % (ref 34–46.6)
HDLC SERPL-MCNC: 71 MG/DL
HGB BLD-MCNC: 12.6 G/DL (ref 11.1–15.9)
INTERPRETATION, 910389: NORMAL
LDLC SERPL CALC-MCNC: 136 MG/DL (ref 0–99)
MCH RBC QN AUTO: 28.4 PG (ref 26.6–33)
MCHC RBC AUTO-ENTMCNC: 32.6 G/DL (ref 31.5–35.7)
MCV RBC AUTO: 87 FL (ref 79–97)
PLATELET # BLD AUTO: 328 X10E3/UL (ref 150–379)
POTASSIUM SERPL-SCNC: 3.4 MMOL/L (ref 3.5–5.2)
PROT SERPL-MCNC: 7 G/DL (ref 6–8.5)
RBC # BLD AUTO: 4.43 X10E6/UL (ref 3.77–5.28)
SODIUM SERPL-SCNC: 144 MMOL/L (ref 134–144)
T4 FREE SERPL-MCNC: 1.19 NG/DL (ref 0.82–1.77)
TRIGL SERPL-MCNC: 110 MG/DL (ref 0–149)
TSH SERPL DL<=0.005 MIU/L-ACNC: 7.39 UIU/ML (ref 0.45–4.5)
VLDLC SERPL CALC-MCNC: 22 MG/DL (ref 5–40)
WBC # BLD AUTO: 8.8 X10E3/UL (ref 3.4–10.8)

## 2018-01-13 NOTE — PROGRESS NOTES
HISTORY OF PRESENT ILLNESS  Martha Jones is a 50 y.o. female. HPI  FU chronic med problems:   Patient Active Problem List   Diagnosis Code    Acne rosacea L71.9    Esophageal reflux K21.9    Mild intermittent asthma without complication J61.49    Cervical spondylosis M47.812    Fibromyalgia M79.7    Essential hypertension, benign I10    Hypercholesterolemia E78.00    Acquired hypothyroidism E03.9     She needs med refills. C/O increase left leg pain. Hx DDD lumbar spine. She is due for labs. Also skin reaction to HRT patch. Mood fair considering loss of her mother last year and long illness of dog. Review of Systems   Musculoskeletal: Positive for back pain, joint pain and myalgias. Skin: Positive for rash. All other systems reviewed and are negative. Visit Vitals    /74 (BP 1 Location: Left arm, BP Patient Position: Sitting)  Comment: nicole    Pulse 90    Temp 97.8 °F (36.6 °C) (Oral)    Resp 20    Ht 5' 4\" (1.626 m)    Wt 212 lb 9.6 oz (96.4 kg)    LMP 11/05/2013    SpO2 97%    BMI 36.49 kg/m2       Physical Exam   Constitutional: She appears well-developed and well-nourished. Eyes: Conjunctivae are normal.   Neck: Neck supple. Cardiovascular: Normal rate, regular rhythm and normal heart sounds. Pulmonary/Chest: Effort normal and breath sounds normal.   Skin: Rash noted. Red patch on lower abdomen where she had removed a medicine patch. Vitals reviewed. ASSESSMENT and PLAN    ICD-10-CM ICD-9-CM    1. Essential hypertension, benign I10 401.1 losartan-hydroCHLOROthiazide (HYZAAR) 100-25 mg per tablet   2. Breast lump on left side at 10 o'clock position N63.22 611.72    3. Fibromyalgia M79.7 729.1 traMADol (ULTRAM) 50 mg tablet      Milnacipran (SAVELLA) 25 mg tablet   4. Muscle spasm M62.838 728.85 metaxalone (SKELAXIN) 800 mg tablet   5. Hypercholesterolemia E78.00 272.0 LIPID PANEL   6.  Acquired hypothyroidism E03.9 244.9 SYNTHROID 137 mcg tablet      T4, FREE      TSH 3RD GENERATION   7. Encounter for long-term current use of medication Z79.899 V58.69 CBC W/O DIFF      METABOLIC PANEL, COMPREHENSIVE   8. Lumbar radiculopathy M54.16 724. 4 predniSONE (STERAPRED DS) 10 mg dose pack   9. Pain of left lower extremity M79.605 729.5 predniSONE (STERAPRED DS) 10 mg dose pack   10. Mild intermittent asthma without complication F42.55 150.10 montelukast (SINGULAIR) 10 mg tablet     Med refills. Labs ordered. Refer to Chiropractor Dr. Tien Morrissey to work on lumbar spine for radiculopathy. Also given pred pack. FU with Dr. Vidal Spencer to consider switching patch due to skin reaction. Pt verbalizes understanding of plan of care and denies further questions or concerns at this time.

## 2018-01-14 ENCOUNTER — TELEPHONE (OUTPATIENT)
Dept: FAMILY MEDICINE CLINIC | Age: 48
End: 2018-01-14

## 2018-01-14 DIAGNOSIS — E03.9 ACQUIRED HYPOTHYROIDISM: Primary | ICD-10-CM

## 2018-01-14 RX ORDER — LEVOTHYROXINE SODIUM 150 MCG
150 TABLET ORAL
Qty: 90 TAB | Refills: 1 | Status: SHIPPED | OUTPATIENT
Start: 2018-01-14 | End: 2018-06-27 | Stop reason: SDUPTHER

## 2018-01-14 NOTE — PROGRESS NOTES
Labs show low thyroid level. Need to increase dose of Synthroid. I will send a new prescription to [facility]. Make sure you are taking this on empty stomach every morning. Will need repeat thyroid lab test in 3 months. Potassium is a little low. Try to eat good sources of potassium in diet. Cholesterol numbers are elevated. Follow low fat diet.

## 2018-07-05 ENCOUNTER — OFFICE VISIT (OUTPATIENT)
Dept: FAMILY MEDICINE CLINIC | Age: 48
End: 2018-07-05

## 2018-07-05 VITALS
BODY MASS INDEX: 35.37 KG/M2 | WEIGHT: 207.2 LBS | TEMPERATURE: 97.9 F | OXYGEN SATURATION: 98 % | DIASTOLIC BLOOD PRESSURE: 78 MMHG | RESPIRATION RATE: 20 BRPM | HEIGHT: 64 IN | SYSTOLIC BLOOD PRESSURE: 120 MMHG | HEART RATE: 68 BPM

## 2018-07-05 DIAGNOSIS — M79.7 FIBROMYALGIA: ICD-10-CM

## 2018-07-05 DIAGNOSIS — Z79.899 ENCOUNTER FOR LONG-TERM CURRENT USE OF MEDICATION: ICD-10-CM

## 2018-07-05 DIAGNOSIS — G89.29 CHRONIC PAIN OF LEFT KNEE: ICD-10-CM

## 2018-07-05 DIAGNOSIS — E55.9 VITAMIN D DEFICIENCY: ICD-10-CM

## 2018-07-05 DIAGNOSIS — M25.562 CHRONIC PAIN OF LEFT KNEE: ICD-10-CM

## 2018-07-05 DIAGNOSIS — E78.00 HYPERCHOLESTEROLEMIA: ICD-10-CM

## 2018-07-05 DIAGNOSIS — E03.9 ACQUIRED HYPOTHYROIDISM: Primary | ICD-10-CM

## 2018-07-05 PROBLEM — E66.01 SEVERE OBESITY (BMI 35.0-39.9): Status: ACTIVE | Noted: 2018-07-05

## 2018-07-05 RX ORDER — METHOCARBAMOL 750 MG/1
750 TABLET, FILM COATED ORAL
Qty: 90 TAB | Refills: 3 | Status: SHIPPED | OUTPATIENT
Start: 2018-07-05 | End: 2019-04-03 | Stop reason: SDUPTHER

## 2018-07-05 RX ORDER — GLUCOSAMINE SULFATE 1500 MG
POWDER IN PACKET (EA) ORAL DAILY
COMMUNITY
End: 2018-07-05 | Stop reason: DRUGHIGH

## 2018-07-05 RX ORDER — DOXYCYCLINE 100 MG/1
100 CAPSULE ORAL 2 TIMES DAILY
COMMUNITY
End: 2020-01-13 | Stop reason: SDUPTHER

## 2018-07-05 RX ORDER — ERGOCALCIFEROL 1.25 MG/1
50000 CAPSULE ORAL
Qty: 15 CAP | Refills: 3 | Status: SHIPPED | OUTPATIENT
Start: 2018-07-05 | End: 2020-01-13 | Stop reason: SDUPTHER

## 2018-07-05 RX ORDER — TRAMADOL HYDROCHLORIDE 50 MG/1
50 TABLET ORAL
Qty: 270 TAB | Refills: 1 | Status: SHIPPED | OUTPATIENT
Start: 2018-07-05 | End: 2019-01-11 | Stop reason: SDUPTHER

## 2018-07-05 NOTE — PROGRESS NOTES
Subjective:      Tiffany Hair is an 46 y.o. female who presents for followup of hypothyroidism. Thyroid ROS: denies fatigue, weight changes, heat/cold intolerance, bowel/skin changes or CVS symptoms. She weaned off [address] in March. Feels no difference off medicine. Memory is actually better. Colonoscopy done 2012 by Dr. Daniel Demarco. Chronic left knee pain with misalignment. Patient Active Problem List    Diagnosis Date Noted    Severe obesity (BMI 35.0-39.9) ([address] 07/05/2018    Acquired hypothyroidism 01/12/2018    Hypercholesterolemia 01/25/2017    Essential hypertension, benign 01/25/2013    Fibromyalgia 11/20/2012    Cervical spondylosis 07/20/2012    Acne rosacea 02/21/2012    Esophageal reflux 02/21/2012    Mild intermittent asthma without complication 83/79/2573     Current Outpatient Prescriptions   Medication Sig Dispense Refill    cholecalciferol (VITAMIN D3) 1,000 unit cap Take  by mouth daily.  SYNTHROID 150 mcg tablet TAKE 1 TABLET DAILY BEFORE BREAKFAST FOR HYPOTHYROIDISM 90 Tab 0    metaxalone (SKELAXIN) 800 mg tablet Take 1 Tab by mouth three (3) times daily. 270 Tab 1    losartan-hydroCHLOROthiazide (HYZAAR) 100-25 mg per tablet TAKE 1 TABLET DAILY 90 Tab 3    montelukast (SINGULAIR) 10 mg tablet TAKE 1 TABLET DAILY 90 Tab 3    estradiol (VIVELLE) 0.1 mg/24 hr APPLY 1 PATCH TO SKIN EVERY MONDAY AND THURSDAY 24 Patch 3    multivitamin (ONE A DAY) tablet Take 1 Tab by mouth daily.  omeprazole (PRILOSEC) 20 mg capsule Take 20 mg by mouth daily.  doxycycline (MONODOX) 100 mg capsule Take 100 mg by mouth two (2) times a day.  traMADol (ULTRAM) 50 mg tablet Take 1 Tab by mouth every eight (8) hours as needed for Pain. 270 Tab 1    EPINEPHrine (EPIPEN) 0.3 mg/0.3 mL injection 0.3 mL by IntraMUSCular route once as needed for up to 2 doses.  2 Syringe 3    albuterol (VENTOLIN HFA) 90 mcg/actuation inhaler Take 2 Puffs by inhalation every four (4) hours as needed for Wheezing. 3 Inhaler 1    valACYclovir (VALTREX) 500 mg tablet Take 500 mg by mouth daily.  acetaminophen (TYLENOL EXTRA STRENGTH) 500 mg tablet Take 1,000 mg by mouth every six (6) hours as needed for Pain.  ergocalciferol, vitamin d2, 50,000 unit Tab Take 50,000 Units by mouth.  Indications: VITAMIN D DEFICIENCY       Allergies   Allergen Reactions    Monsel's [Ferric Subsulfate] Swelling     Extreme burning, skin sloughing    Other Plant, Animal, Environmental Unknown (comments)     Vinegar    Premarin [Conjugated Estrogens] Itching     Past Medical History:   Diagnosis Date    Abnormal Pap smear 1/2/2012    [facility] -evaluation negative    Arthritis     Asthma     Atypical squamous cells of undetermined significance (ASCUS) on Papanicolaou smear of cervix 10/08/15    HPV Negative    Autoimmune disease ([facility]     sjogrens    Bartholin's gland cyst     right marsupialization    Diverticulitis     in the past- has seen Dr. iMke Villa for evaluation    Dyspareunia     Endometriosis     Fibromyalgia     GERD (gastroesophageal reflux disease)     Hiatal hernia     Hypercholesterolemia 1/25/2017    Hypertension     Hypothyroid     IBS (irritable bowel syndrome)     Lichen sclerosus     Nausea & vomiting     Other ill-defined conditions(799.89)     left leg edema    Pelvic pain     Rosacea     Sjogren's syndrome ([facility]     sees Dr. 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-suggestive of LS     Past Surgical History:   Procedure Laterality Date    HX CHOLECYSTECTOMY      HX COLPOSCOPY  2/14/12    No dysplasia    HX DILATION AND CURETTAGE  2/14/2012    benign tissue- problems with anesthesia afterwards    HX HYSTERECTOMY      HX LAPAROSCOPIC SUPRACERVICAL HYSTERECTOMY  11/25/13    w/RSO/hysterectomy    HX OOPHORECTOMY      HX ORTHOPAEDIC Left     ACLX2    HX OTHER SURGICAL Nikkie Marsupialization    HX OTHER SURGICAL  02/07    LSO--Laparotomy w/ [place] also    HX OTHER SURGICAL  11/08    vulvar biopsy     Family History   Problem Relation Age of Onset    Heart Disease Mother      pacemaker, defibrillator    Osteoporosis Mother     Diabetes Brother     Hypertension Father      Social History   Substance Use Topics    Smoking status: Never Smoker    Smokeless tobacco: Never Used    Alcohol use 0.0 oz/week      Comment: very rarely-less than 5X/year        Objective:     Visit Vitals    /78 (BP 1 Location: Left arm, BP Patient Position: Sitting)  Comment: manual    Pulse 68    Temp 97.9 °F (36.6 °C) (Oral)    Resp 20    Ht 5' 4\" (1.626 m)    Wt 207 lb 3.2 oz (94 kg)    LMP 11/05/2013    SpO2 98%    BMI 35.57 kg/m2     Exam: thyroid is normal in size without nodules or tenderness. Chest clear. CVS RRR. Left knee valgus deformity. Laboratory:  Lab Results   Component Value Date/Time    TSH 7.390 (H) 01/12/2018 04:30 PM       Assessment/Plan:     hypothyroidism . orders as documented in EMR. ICD-10-CM ICD-9-CM    1. Acquired hypothyroidism E03.9 244.9 TSH 3RD GENERATION      T4, FREE   2. Hypercholesterolemia E78.00 272.0    3. Vitamin D deficiency E55.9 268.9 VITAMIN D, 25 HYDROXY      ergocalciferol (ERGOCALCIFEROL) 50,000 unit capsule   4. Encounter for long-term current use of medication Y38.006 M10.82 METABOLIC PANEL, COMPREHENSIVE      CBC W/O DIFF   5. Chronic pain of left knee M25.562 719.46 REFERRAL TO ORTHOPEDIC SURGERY    G89.29 338.29 traMADol (ULTRAM) 50 mg tablet   6. Fibromyalgia M79.7 729.1 traMADol (ULTRAM) 50 mg tablet      methocarbamol (ROBAXIN) 750 mg tablet   .

## 2018-07-05 NOTE — MR AVS SNAPSHOT
[address] [place] [address] [address] [facility] 
[phone] Patient: Ad Chamorro MRN: [id number] SFS:5/95/5770 Visit Information Date & Time Provider Department Dept. Phone Encounter #  
 2/3/6656  2:60 AM Sheldon Neumann [phone] [id number] Upcoming Health Maintenance Date Due Pneumococcal 19-64 Medium Risk (1 of 1 - PPSV23) 3/14/1987 FOBT Q 1 YEAR AGE 50-75 3/14/2018 Influenza Age 5 to Adult 8/1/2018 BREAST CANCER SCRN MAMMOGRAM 3/10/2019 PAP AKA CERVICAL CYTOLOGY 3/10/2020 DTaP/Tdap/Td series (2 - Td) 5/10/2028 Allergies as of 7/5/2018  Review Complete On: 1/6/8463 By: Diana Adams MD  
  
 Severity Noted Reaction Type Reactions Monsel's [Ferric Subsulfate]  11/18/2013    Swelling Extreme burning, skin sloughing Other Plant, Animal, Environmental  05/19/2014    Unknown (comments) Vinegar Premarin [Conjugated Estrogens]  11/25/2013   Topical Itching Current Immunizations  Reviewed on 5/10/2017 Name Date Influenza Vaccine (Quad) PF 10/2/2017, 12/9/2015, 11/26/2014 Influenza Vaccine Split 10/8/2012 Tdap 5/10/2018 Not reviewed this visit You Were Diagnosed With   
  
 Codes Comments Acquired hypothyroidism    -  Primary ICD-10-CM: E03.9 ICD-9-CM: 244.9 Hypercholesterolemia     ICD-10-CM: E78.00 ICD-9-CM: 272.0 Vitamin D deficiency     ICD-10-CM: E55.9 ICD-9-CM: 268.9 Encounter for long-term current use of medication     ICD-10-CM: Z79.899 ICD-9-CM: V58.69 Chronic pain of left knee     ICD-10-CM: M25.562, G89.29 ICD-9-CM: 719.46, 338.29 Fibromyalgia     ICD-10-CM: M79.7 ICD-9-CM: 729.1 Vitals BP Pulse Temp Resp Height(growth percentile) Weight(growth percentile) 120/78 (BP 1 Location: Left arm, BP Patient Position: Sitting) 68 97.9 °F (36.6 °C) (Oral) 20 5' 4\" (1.626 m) 207 lb 3.2 oz (94 kg) LMP SpO2 BMI OB Status Smoking Status 11/05/2013 98% 35.57 kg/m2 Hysterectomy Never Smoker BMI and BSA Data Body Mass Index Body Surface Area 35.57 kg/m 2 2.06 m 2 Preferred Pharmacy Pharmacy Name Phone [facility] [phone] Your Updated Medication List  
  
   
This list is accurate as of 7/5/18  9:18 AM.  Always use your most recent med list.  
  
  
  
  
 albuterol 90 mcg/actuation inhaler Commonly known as:  VENTOLIN HFA Take 2 Puffs by inhalation every four (4) hours as needed for Wheezing. doxycycline 100 mg capsule Commonly known as:  Rodolph Forget Take 100 mg by mouth two (2) times a day. EPINEPHrine 0.3 mg/0.3 mL injection Commonly known as:  EPIPEN  
0.3 mL by IntraMUSCular route once as needed for up to 2 doses. ergocalciferol 50,000 unit capsule Commonly known as:  ERGOCALCIFEROL Take 1 Cap by mouth every seven (7) days. Indications: Vitamin D Deficiency  
  
 estradiol 0.1 mg/24 hr  
Commonly known as:  VIVELLE  
APPLY 1 PATCH TO SKIN EVERY MONDAY AND THURSDAY  
  
 losartan-hydroCHLOROthiazide 100-25 mg per tablet Commonly known as:  HYZAAR  
TAKE 1 TABLET DAILY  
  
 methocarbamol 750 mg tablet Commonly known as:  ROBAXIN Take 1 Tab by mouth nightly. montelukast 10 mg tablet Commonly known as:  SINGULAIR  
TAKE 1 TABLET DAILY  
  
 multivitamin tablet Commonly known as:  ONE A DAY Take 1 Tab by mouth daily. omeprazole 20 mg capsule Commonly known as:  PRILOSEC Take 20 mg by mouth daily. SYNTHROID 150 mcg tablet Generic drug:  levothyroxine TAKE 1 TABLET DAILY BEFORE BREAKFAST FOR HYPOTHYROIDISM  
  
 traMADol 50 mg tablet Commonly known as:  ULTRAM  
Take 1 Tab by mouth every eight (8) hours as needed for Pain. TYLENOL EXTRA STRENGTH 500 mg tablet Generic drug:  acetaminophen Take 1,000 mg by mouth every six (6) hours as needed for Pain. VALTREX 500 mg tablet Generic drug:  valACYclovir Take 500 mg by mouth daily. Prescriptions Printed Refills  
 traMADol (ULTRAM) 50 mg tablet 1 Sig: Take 1 Tab by mouth every eight (8) hours as needed for Pain. Class: Print Route: Oral  
  
Prescriptions Sent to Pharmacy Refills  
 methocarbamol (ROBAXIN) 750 mg tablet 3 Sig: Take 1 Tab by mouth nightly. Class: Normal  
 Pharmacy: [address] [place] [address], [address] [place] [address] Ph #: [phone] Route: Oral  
 ergocalciferol (ERGOCALCIFEROL) 50,000 unit capsule 3 Sig: Take 1 Cap by mouth every seven (7) days. Indications: Vitamin D Deficiency Class: Normal  
 Pharmacy: [address] [place] [address], [address] [place] [address] Ph #: [phone] Route: Oral  
  
We Performed the Following CBC W/O DIFF [58046 CPT(R)] METABOLIC PANEL, COMPREHENSIVE [47912 CPT(R)] REFERRAL TO ORTHOPEDIC SURGERY [REF62 Custom] T4, FREE B6152633 CPT(R)] TSH 3RD GENERATION [99132 CPT(R)] VITAMIN D, 25 HYDROXY [id number] CPT(R)] Referral Information Referral ID Referred By Referred To  
  
 [id number] [place] [address] [address] Phone: [phone] Fax: [phone] Visits Status Start Date End Date 1 New Request 7/5/18 7/5/19 If your referral has a status of pending review or denied, additional information will be sent to support the outcome of this decision. Patient Instructions Fibromyalgia: Care Instructions Your Care Instructions Fibromyalgia is a painful condition that is not completely understood by medical experts. The cause of fibromyalgia is not known. It can make you feel tired and ache all over.  It causes tender spots at specific points of the body that hurt only when you press on them. You may have trouble sleeping, as well as other symptoms. These problems can upset your work and home life. Symptoms tend to come and go, although they may never go away completely. Fibromyalgia does not harm your muscles, joints, or organs. Follow-up care is a key part of your treatment and safety. Be sure to make and go to all appointments, and call your doctor if you are having problems. It's also a good idea to know your test results and keep a list of the medicines you take. How can you care for yourself at home? · Exercise often. Walk, swim, or bike to help with pain and sleep problems and to make you feel better. · Try to get a good night's sleep. Go to bed and get up at the same time each day, whether you feel rested or not. Make sure you have a good mattress and pillow. · Reduce stress. Avoid things that cause you stress, if you can. If not, work at making them less stressful. Learn to use biofeedback, guided imagery, meditation, or other methods to relax. · Make healthy changes. Eat a balanced diet, quit smoking, and limit alcohol and caffeine. · Use a heating pad set on low or take warm baths or showers for pain. Using cold packs for up to 20 minutes at a time can also relieve pain. Put a thin cloth between the cold pack and your skin. A gentle massage might help too. · Be safe with medicines. Take your medicines exactly as prescribed. Call your doctor if you think you are having a problem with your medicine. Your doctor may talk to you about taking antidepressant medicines. These medicines may improve sleep, relieve pain, and in some cases treat depression. · Learn about fibromyalgia. This makes coping easier. Then, take an active role in your treatment. · Think about joining a support group with others who have fibromyalgia to learn more and get support. When should you call for help? Watch closely for changes in your health, and be sure to contact your doctor if: 
? · You feel sad, helpless, or hopeless; lose interest in things you used to enjoy; or have other symptoms of depression. ? · Your fibromyalgia symptoms get worse. Where can you learn more? Go to http://jesus-manasa.info/. Enter V003 in the search box to learn more about \"Fibromyalgia: Care Instructions. \" Current as of: October 14, 2016 Content Version: 11.4 © 3640-7683 [employer]. Care instructions adapted under license by [employer] (which disclaims liability or warranty for this information). If you have questions about a medical condition or this instruction, always ask your healthcare professional. [address] any warranty or liability for your use of this information. Introducing [facility]! Dear Joseph David: 
Thank you for requesting a [facility] account. Our records indicate that you already have an active [facility] account. You can access your account anytime at [email or web address] Did you know that you can access your hospital and ER discharge instructions at any time in [facility]? You can also review all of your test results from your hospital stay or ER visit. Additional Information If you have questions, please visit the Frequently Asked Questions section of the [facility] website at [email or web address] Remember, [facility] is NOT to be used for urgent needs. For medical emergencies, dial 911. Now available from your iPhone and Android! Please provide this summary of care documentation to your next provider. Your primary care clinician is listed as Erin De La Rosa. If you have any questions after today's visit, please call [phone].

## 2018-07-05 NOTE — LETTER
7/10/2018 12:09 PM 
 
Ms. Jhony Galeas [id number] [id number] Dear Jhony Galeas: Please find your most recent results below. Resulted Orders METABOLIC PANEL, COMPREHENSIVE Result Value Ref Range Glucose 85 65 - 99 mg/dL BUN 18 6 - 24 mg/dL Creatinine 0.79 0.57 - 1.00 mg/dL GFR est non-AA 88 >59 mL/min/1.73 GFR est  >59 mL/min/1.73  
 BUN/Creatinine ratio 23 9 - 23 Sodium 143 134 - 144 mmol/L Potassium 3.7 3.5 - 5.2 mmol/L Chloride 100 96 - 106 mmol/L  
 CO2 25 20 - 29 mmol/L Comment: **Please note reference interval change** Calcium 9.6 8.7 - 10.2 mg/dL Protein, total 6.8 6.0 - 8.5 g/dL Albumin 4.2 3.5 - 5.5 g/dL GLOBULIN, TOTAL 2.6 1.5 - 4.5 g/dL A-G Ratio 1.6 1.2 - 2.2 Bilirubin, total <0.2 0.0 - 1.2 mg/dL Alk. phosphatase 79 39 - 117 IU/L  
 AST (SGOT) 19 0 - 40 IU/L  
 ALT (SGPT) 11 0 - 32 IU/L Narrative Performed at:  [address]  [id number] : Jennifer Bond MD, Phone:  [phone] CBC W/O DIFF Result Value Ref Range WBC 8.8 3.4 - 10.8 x10E3/uL  
 RBC 4.22 3.77 - 5.28 x10E6/uL HGB 12.0 11.1 - 15.9 g/dL HCT 37.4 34.0 - 46.6 % MCV 89 79 - 97 fL  
 MCH 28.4 26.6 - 33.0 pg  
 MCHC 32.1 31.5 - 35.7 g/dL  
 RDW 14.6 12.3 - 15.4 % PLATELET 643 575 - 326 x10E3/uL Narrative Performed at:  [address]  [id number] : Jennifer Bond MD, Phone:  [phone] TSH 3RD GENERATION Result Value Ref Range TSH 2.240 0.450 - 4.500 uIU/mL Narrative Performed at:  [address]  [id number] : Jennifer Bond MD, Phone:  [phone] T4, FREE Result Value Ref Range T4, Free 1.29 0.82 - 1.77 ng/dL Narrative Performed at:  [address]  [id number] : Kay Rios MD, Phone:  [phone] VITAMIN D, 25 HYDROXY Result Value Ref Range VITAMIN D, 25-HYDROXY 40.3 30.0 - 100.0 ng/mL Comment:  
   Vitamin D deficiency has been defined by the [facility] practice guideline as a 
level of serum 25-OH vitamin D less than 20 ng/mL (1,2). The Endocrine Society went on to further define vitamin D 
insufficiency as a level between 21 and 29 ng/mL (2). 1. IOM ([place] of [employer]). 2010. Dietary reference 
   intakes for calcium and D. [facility]: The 
   [place]. 2. Andra MF, Manuel NC, Bakari LEO, et al. 
   Evaluation, treatment, and prevention of vitamin D 
   deficiency: an Endocrine Society clinical practice 
   guideline. JCEM. 2011 Jul; 96(7):1911-30. Narrative Performed at:  [address]  [id number] : Kay Rios MD, Phone:  [phone] RECOMMENDATIONS: 
Good lab results! Normal blood counts, sugar, liver, kidney and thyroid tests. Vit D level is at goal.  
 
Please call me if you have any questions: [phone] Sincerely, Steven Campos MD

## 2018-07-05 NOTE — PROGRESS NOTES
Identified pt with two pt identifiers(name and ). Chief Complaint   Patient presents with    Thyroid Problem        Health Maintenance Due   Topic    Pneumococcal 19-64 Medium Risk (1 of 1 - PPSV23)    FOBT Q 1 YEAR AGE 50-75        Wt Readings from Last 3 Encounters:   18 207 lb 3.2 oz (94 kg)   18 212 lb 9.6 oz (96.4 kg)   05/10/17 210 lb 6.4 oz (95.4 kg)     Temp Readings from Last 3 Encounters:   18 97.9 °F (36.6 °C) (Oral)   18 97.8 °F (36.6 °C) (Oral)   05/10/17 98.4 °F (36.9 °C) (Oral)     BP Readings from Last 3 Encounters:   18 120/78   18 114/74   05/10/17 125/70     Pulse Readings from Last 3 Encounters:   18 68   18 90   05/10/17 90         Learning Assessment:  :     Learning Assessment 2014   PRIMARY LEARNER Patient Patient Patient   HIGHEST LEVEL OF EDUCATION - PRIMARY LEARNER  - - SOME COLLEGE   BARRIERS PRIMARY LEARNER - - NONE   CO-LEARNER CAREGIVER - - No   PRIMARY LANGUAGE ENGLISH ENGLISH ENGLISH   LEARNER PREFERENCE PRIMARY DEMONSTRATION DEMONSTRATION DEMONSTRATION     - - OTHER (COMMENT)   ANSWERED BY patient patient self   RELATIONSHIP SELF SELF SELF       Depression Screening:  :     PHQ over the last two weeks 2018   Little interest or pleasure in doing things Several days   Feeling down, depressed or hopeless Several days   Total Score PHQ 2 2       Fall Risk Assessment:  :     Fall Risk Assessment, last 12 mths 2018   Able to walk? Yes   Fall in past 12 months? No       Abuse Screening:  :     Abuse Screening Questionnaire 2018   Do you ever feel afraid of your partner? N N N   Are you in a relationship with someone who physically or mentally threatens you? N N N   Is it safe for you to go home?  Y Y Y       Coordination of Care Questionnaire:  :     1) Have you been to an emergency room, urgent care clinic since your last visit? no   Hospitalized since your last visit? no 2) Have you seen or consulted any other health care providers outside of [address] [place] [address] since your last visit? no  (Include any pap smears or colon screenings in this section.)    3) Do you have an Advance Directive on file? no  Are you interested in receiving information about Advance Directives? no    Reviewed record in preparation for visit and have obtained necessary documentation. Medication reconciliation up to date and corrected with patient at this time.

## 2018-07-05 NOTE — PATIENT INSTRUCTIONS
Fibromyalgia: Care Instructions  Your Care Instructions    Fibromyalgia is a painful condition that is not completely understood by medical experts. The cause of fibromyalgia is not known. It can make you feel tired and ache all over. It causes tender spots at specific points of the body that hurt only when you press on them. You may have trouble sleeping, as well as other symptoms. These problems can upset your work and home life. Symptoms tend to come and go, although they may never go away completely. Fibromyalgia does not harm your muscles, joints, or organs. Follow-up care is a key part of your treatment and safety. Be sure to make and go to all appointments, and call your doctor if you are having problems. It's also a good idea to know your test results and keep a list of the medicines you take. How can you care for yourself at home? · Exercise often. Walk, swim, or bike to help with pain and sleep problems and to make you feel better. · Try to get a good night's sleep. Go to bed and get up at the same time each day, whether you feel rested or not. Make sure you have a good mattress and pillow. · Reduce stress. Avoid things that cause you stress, if you can. If not, work at making them less stressful. Learn to use biofeedback, guided imagery, meditation, or other methods to relax. · Make healthy changes. Eat a balanced diet, quit smoking, and limit alcohol and caffeine. · Use a heating pad set on low or take warm baths or showers for pain. Using cold packs for up to 20 minutes at a time can also relieve pain. Put a thin cloth between the cold pack and your skin. A gentle massage might help too. · Be safe with medicines. Take your medicines exactly as prescribed. Call your doctor if you think you are having a problem with your medicine. Your doctor may talk to you about taking antidepressant medicines. These medicines may improve sleep, relieve pain, and in some cases treat depression.   · Learn about fibromyalgia. This makes coping easier. Then, take an active role in your treatment. · Think about joining a support group with others who have fibromyalgia to learn more and get support. When should you call for help? Watch closely for changes in your health, and be sure to contact your doctor if:  ? · You feel sad, helpless, or hopeless; lose interest in things you used to enjoy; or have other symptoms of depression. ? · Your fibromyalgia symptoms get worse. Where can you learn more? Go to http://jesus-manasa.info/. Enter V003 in the search box to learn more about \"Fibromyalgia: Care Instructions. \"  Current as of: October 14, 2016  Content Version: 11.4  © 9352-0240 [employer], [employer]. Care instructions adapted under license by [employer] (which disclaims liability or warranty for this information). If you have questions about a medical condition or this instruction, always ask your healthcare professional. [address] any warranty or liability for your use of this information.

## 2018-07-06 LAB
25(OH)D3+25(OH)D2 SERPL-MCNC: 40.3 NG/ML (ref 30–100)
ALBUMIN SERPL-MCNC: 4.2 G/DL (ref 3.5–5.5)
ALBUMIN/GLOB SERPL: 1.6 {RATIO} (ref 1.2–2.2)
ALP SERPL-CCNC: 79 IU/L (ref 39–117)
ALT SERPL-CCNC: 11 IU/L (ref 0–32)
AST SERPL-CCNC: 19 IU/L (ref 0–40)
BILIRUB SERPL-MCNC: <0.2 MG/DL (ref 0–1.2)
BUN SERPL-MCNC: 18 MG/DL (ref 6–24)
BUN/CREAT SERPL: 23 (ref 9–23)
CALCIUM SERPL-MCNC: 9.6 MG/DL (ref 8.7–10.2)
CHLORIDE SERPL-SCNC: 100 MMOL/L (ref 96–106)
CO2 SERPL-SCNC: 25 MMOL/L (ref 20–29)
CREAT SERPL-MCNC: 0.79 MG/DL (ref 0.57–1)
ERYTHROCYTE [DISTWIDTH] IN BLOOD BY AUTOMATED COUNT: 14.6 % (ref 12.3–15.4)
GLOBULIN SER CALC-MCNC: 2.6 G/DL (ref 1.5–4.5)
GLUCOSE SERPL-MCNC: 85 MG/DL (ref 65–99)
HCT VFR BLD AUTO: 37.4 % (ref 34–46.6)
HGB BLD-MCNC: 12 G/DL (ref 11.1–15.9)
MCH RBC QN AUTO: 28.4 PG (ref 26.6–33)
MCHC RBC AUTO-ENTMCNC: 32.1 G/DL (ref 31.5–35.7)
MCV RBC AUTO: 89 FL (ref 79–97)
PLATELET # BLD AUTO: 289 X10E3/UL (ref 150–379)
POTASSIUM SERPL-SCNC: 3.7 MMOL/L (ref 3.5–5.2)
PROT SERPL-MCNC: 6.8 G/DL (ref 6–8.5)
RBC # BLD AUTO: 4.22 X10E6/UL (ref 3.77–5.28)
SODIUM SERPL-SCNC: 143 MMOL/L (ref 134–144)
T4 FREE SERPL-MCNC: 1.29 NG/DL (ref 0.82–1.77)
TSH SERPL DL<=0.005 MIU/L-ACNC: 2.24 UIU/ML (ref 0.45–4.5)
WBC # BLD AUTO: 8.8 X10E3/UL (ref 3.4–10.8)

## 2018-09-26 DIAGNOSIS — E03.9 ACQUIRED HYPOTHYROIDISM: ICD-10-CM

## 2018-09-26 RX ORDER — LEVOTHYROXINE SODIUM 150 MCG
TABLET ORAL
Qty: 90 TAB | Refills: 0 | Status: SHIPPED | OUTPATIENT
Start: 2018-09-26 | End: 2018-12-24 | Stop reason: SDUPTHER

## 2018-10-02 ENCOUNTER — OFFICE VISIT (OUTPATIENT)
Dept: OBGYN CLINIC | Age: 48
End: 2018-10-02

## 2018-10-02 VITALS
BODY MASS INDEX: 35.92 KG/M2 | SYSTOLIC BLOOD PRESSURE: 124 MMHG | WEIGHT: 210.4 LBS | DIASTOLIC BLOOD PRESSURE: 70 MMHG | HEIGHT: 64 IN

## 2018-10-02 DIAGNOSIS — Z01.419 ENCOUNTER FOR GYNECOLOGICAL EXAMINATION WITHOUT ABNORMAL FINDING: Primary | ICD-10-CM

## 2018-10-02 NOTE — PATIENT INSTRUCTIONS
Breast Self-Exam: Care Instructions  Your Care Instructions    A breast self-exam is when you check your breasts for lumps or changes. This regular exam helps you learn how your breasts normally look and feel. Most breast problems or changes are not because of cancer. Breast self-exam is not a substitute for a mammogram. Having regular breast exams by your doctor and regular mammograms improve your chances of finding any problems with your breasts. Some women set a time each month to do a step-by-step breast self-exam. Other women like a less formal system. They might look at their breasts as they brush their teeth, or feel their breasts once in a while in the shower. If you notice a change in your breast, tell your doctor. Follow-up care is a key part of your treatment and safety. Be sure to make and go to all appointments, and call your doctor if you are having problems. It's also a good idea to know your test results and keep a list of the medicines you take. How do you do a breast self-exam?  · The best time to examine your breasts is usually one week after your menstrual period begins. Your breasts should not be tender then. If you do not have periods, you might do your exam on a day of the month that is easy to remember. · To examine your breasts:  ¨ Remove all your clothes above the waist and lie down. When you are lying down, your breast tissue spreads evenly over your chest wall, which makes it easier to feel all your breast tissue. ¨ Use the pads-not the fingertips-of the 3 middle fingers of your left hand to check your right breast. Move your fingers slowly in small coin-sized circles that overlap. ¨ Use three levels of pressure to feel of all your breast tissue. Use light pressure to feel the tissue close to the skin surface. Use medium pressure to feel a little deeper. Use firm pressure to feel your tissue close to your breastbone and ribs.  Use each pressure level to feel your breast tissue before moving on to the next spot. ¨ Check your entire breast, moving up and down as if following a strip from the collarbone to the bra line, and from the armpit to the ribs. Repeat until you have covered the entire breast.  ¨ Repeat this procedure for your left breast, using the pads of the 3 middle fingers of your right hand. · To examine your breasts while in the shower:  ¨ Place one arm over your head and lightly soap your breast on that side. ¨ Using the pads of your fingers, gently move your hand over your breast (in the strip pattern described above), feeling carefully for any lumps or changes. ¨ Repeat for the other breast.  · Have your doctor inspect anything you notice to see if you need further testing. Where can you learn more? Go to http://jesus-manasa.info/. Enter P148 in the search box to learn more about \"Breast Self-Exam: Care Instructions. \"  Current as of: May 12, 2017  Content Version: 11.7  © 5597-0392 [employer]. Care instructions adapted under license by [employer] (which disclaims liability or warranty for this information). If you have questions about a medical condition or this instruction, always ask your healthcare professional. [place] [address] any warranty or liability for your use of this information.

## 2018-10-02 NOTE — PROGRESS NOTES
Laurie Weaver is a [de-identified] ,  46 y.o. female [facility] whose Patient's last menstrual period was 11/05/2013. She presents for her annual checkup. She is having skin irritation with the Vivelle patches. Hormone Status:    She is not having vasomotor symptoms. The patient is using HRT: Vivelle 0.1mg--red, raised, itchy underneath patches. Sexual history:    She  reports that she currently engages in sexual activity and has had male partners. She reports using the following method of birth control/protection: Surgical.    Medical conditions:    Since her last annual GYN exam about one year ago, she has had the following changes in her health history: none. Pap and Mammogram History:    Her most recent Pap smear was normal, HPV neg obtained 3/10/2017. H/o ASCUS pap neg HPV 2015. The patient had her mammogram today in our office. Breast Cancer History/Substance Abuse:    She has no family history of breast cancer. Osteoporosis History:    Family history does not include a first or second degree relative with osteopenia or osteoporosis. She is currently taking calcium and vit D.       Past Medical History:   Diagnosis Date    Abnormal Pap smear 1/2/2012    [facility]MI -evaluation negative    Arthritis     Asthma     Atypical squamous cells of undetermined significance (ASCUS) on Papanicolaou smear of cervix 10/08/15    HPV Negative    Autoimmune disease ([facility] [address]     sjogrens    Bartholin's gland cyst     right marsupialization    Diverticulitis     in the past- has seen Dr. Flavio Rosario for evaluation    Dyspareunia     Endometriosis     Fibromyalgia     GERD (gastroesophageal reflux disease)     Hiatal hernia     Hypercholesterolemia 1/25/2017    Hypertension     Hypothyroid     IBS (irritable bowel syndrome)     Lichen sclerosus     Pelvic pain     Rosacea     Sjogren's syndrome ([facility] [address]     sees Dr. 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    vulvar biopsy done-suggestive of LS     Past Surgical History:   Procedure Laterality Date    HX CHOLECYSTECTOMY      HX COLPOSCOPY  2/14/12    No dysplasia    HX DILATION AND CURETTAGE  2/14/2012    benign tissue- problems with anesthesia afterwards    HX HYSTERECTOMY      HX LAPAROSCOPIC SUPRACERVICAL HYSTERECTOMY  11/25/2013    w/ RSO    HX OOPHORECTOMY      HX ORTHOPAEDIC Left     ACLX2    HX OTHER SURGICAL      Bartholins Marsupialization    HX OTHER SURGICAL  02/07    LSO--Laparotomy w/ [place] also    HX OTHER SURGICAL  11/08    vulvar biopsy     Tobacco History:  reports that she has never smoked. She has never used smokeless tobacco.  Alcohol Abuse:  reports that she drinks alcohol. Drug Abuse:  reports that she does not use illicit drugs. Current Outpatient Prescriptions   Medication Sig Dispense Refill    SYNTHROID 150 mcg tablet TAKE 1 TABLET DAILY BEFORE BREAKFAST FOR HYPOTHYROIDISM (NEED OFFICE VISIT) 90 Tab 0    doxycycline (MONODOX) 100 mg capsule Take 100 mg by mouth two (2) times a day.  traMADol (ULTRAM) 50 mg tablet Take 1 Tab by mouth every eight (8) hours as needed for Pain. 270 Tab 1    methocarbamol (ROBAXIN) 750 mg tablet Take 1 Tab by mouth nightly. 90 Tab 3    ergocalciferol (ERGOCALCIFEROL) 50,000 unit capsule Take 1 Cap by mouth every seven (7) days. Indications: Vitamin D Deficiency 15 Cap 3    losartan-hydroCHLOROthiazide (HYZAAR) 100-25 mg per tablet TAKE 1 TABLET DAILY 90 Tab 3    EPINEPHrine (EPIPEN) 0.3 mg/0.3 mL injection 0.3 mL by IntraMUSCular route once as needed for up to 2 doses. 2 Syringe 3    albuterol (VENTOLIN HFA) 90 mcg/actuation inhaler Take 2 Puffs by inhalation every four (4) hours as needed for Wheezing. 3 Inhaler 1    estradiol (VIVELLE) 0.1 mg/24 hr APPLY 1 PATCH TO SKIN EVERY MONDAY AND THURSDAY 24 Patch 3    valACYclovir (VALTREX) 500 mg tablet Take 500 mg by mouth daily.       multivitamin (ONE A DAY) tablet Take 1 Tab by mouth daily.  omeprazole (PRILOSEC) 20 mg capsule Take 20 mg by mouth daily.  montelukast (SINGULAIR) 10 mg tablet TAKE 1 TABLET DAILY 90 Tab 3    acetaminophen (TYLENOL EXTRA STRENGTH) 500 mg tablet Take 1,000 mg by mouth every six (6) hours as needed for Pain. Allergies: Monsel's [ferric subsulfate]; Other plant, animal, environmental; and Premarin [conjugated estrogens]   Social History     Social History    Marital status:      Spouse name: N/A    Number of children: N/A    Years of education: N/A     Occupational History    Not on file. Social History Main Topics    Smoking status: Never Smoker    Smokeless tobacco: Never Used    Alcohol use 0.0 oz/week      Comment: very rarely-less than 5X/year    Drug use: No    Sexual activity: Yes     Partners: Male     Birth control/ protection: Surgical      Comment: Hysterectomy     Other Topics Concern    Not on file     Social History Narrative     Patient Active Problem List   Diagnosis Code    Acne rosacea L71.9    Esophageal reflux K21.9    Mild intermittent asthma without complication Z74.91    Cervical spondylosis M47.812    Fibromyalgia M79.7    Essential hypertension, benign I10    Hypercholesterolemia E78.00    Acquired hypothyroidism E03.9    Severe obesity (BMI 35.0-39. 9) E66.01       Review of Systems - History obtained from the patient  Constitutional: negative for weight loss, fever, night sweats  HEENT: negative for hearing loss, earache, congestion, snoring, sorethroat  CV: negative for chest pain, palpitations, edema  Resp: negative for cough, shortness of breath, wheezing  GI: negative for change in bowel habits, abdominal pain, black or bloody stools  : negative for frequency, dysuria, hematuria, vaginal discharge  MSK: negative for back pain, joint pain, muscle pain  Breast: negative for breast lumps, nipple discharge, galactorrhea  Skin :negative for itching, rash, hives  Neuro: negative for dizziness, headache, confusion, weakness  Psych: negative for anxiety, depression, change in mood  Heme/lymph: negative for bleeding, bruising, pallor    Physical Exam    Visit Vitals    /70 (BP 1 Location: Right arm, BP Patient Position: Sitting)    Ht 5' 4\" (1.626 m)    Wt 210 lb 6.4 oz (95.4 kg)    LMP 11/05/2013    BMI 36.12 kg/m2     Constitutional  · Appearance: well-nourished, well developed, alert, in no acute distress    HENT  · Head and Face: appears normal    Neck  · Inspection/Palpation: normal appearance, no masses or tenderness  · Lymph Nodes: no lymphadenopathy present  · Thyroid: gland size normal, nontender, no nodules or masses present on palpation    Chest  · Respiratory Effort: breathing normal        Auscultation: normal breath sounds    Cardiovascular  · Heart:  · Auscultation: regular rate and rhythm without murmur    Breasts  · Inspection of Breasts: breasts symmetrical, no skin changes, no discharge present, nipple appearance normal, no skin retraction present  · Palpation of Breasts and Axillae: no masses present on palpation, no breast tenderness  · Axillary Lymph Nodes: no lymphadenopathy present    Gastrointestinal  · Abdominal Examination: abdomen non-tender to palpation, normal bowel sounds, no masses present  · Liver and spleen: no hepatomegaly present, spleen not palpable  · Hernias: no hernias identified    Genitourinary  · External Genitalia: normal appearance for age, no discharge present, no tenderness present, no inflammatory lesions present, no masses present, no atrophy present  · Vagina: normal vaginal vault without central or paravaginal defects, no discharge present, no inflammatory lesions present, no masses present  · Bladder: non-tender to palpation  · Urethra: appears normal  · Cervix: present  · Uterus: absent  · Adnexa: no adnexal tenderness present, no adnexal masses present  · Perineum: perineum within normal limits, no evidence of trauma, no rashes or skin lesions present  · Anus: anus within normal limits, no hemorrhoids present  · Inguinal Lymph Nodes: no lymphadenopathy present      Skin  · General Inspection: no rash, no lesions identified    Neurologic/Psychiatric  · Mental Status:  · Orientation: grossly oriented to person, place and time  · Mood and Affect: mood normal, affect appropriate    . Assessment:  Routine gynecologic examination  Her current medical status is satisfactory with no evidence of significant gynecologic issues. Irritation from ERT patch--will try Divigel.     Plan:  Counseled re: diet, exercise, healthy lifestyle  Return for yearly wellness visits  Rec annual mammogram

## 2018-10-04 DIAGNOSIS — R92.8 ABNORMAL MAMMOGRAM OF LEFT BREAST: Primary | ICD-10-CM

## 2018-10-12 ENCOUNTER — HOSPITAL ENCOUNTER (OUTPATIENT)
Dept: MAMMOGRAPHY | Age: 48
Discharge: HOME | End: 2018-10-12
Attending: OBSTETRICS & GYNECOLOGY
Payer: COMMERCIAL

## 2018-10-12 ENCOUNTER — HOSPITAL ENCOUNTER (OUTPATIENT)
Dept: MAMMOGRAPHY | Age: 48
Discharge: HOME | End: 2018-10-12

## 2018-10-12 DIAGNOSIS — R92.8 ABNORMAL MAMMOGRAM OF LEFT BREAST: ICD-10-CM

## 2018-10-12 PROCEDURE — 77065 DX MAMMO INCL CAD UNI: CPT

## 2018-12-24 DIAGNOSIS — E03.9 ACQUIRED HYPOTHYROIDISM: ICD-10-CM

## 2018-12-24 RX ORDER — LEVOTHYROXINE SODIUM 150 MCG
TABLET ORAL
Qty: 90 TAB | Refills: 0 | Status: SHIPPED | OUTPATIENT
Start: 2018-12-24 | End: 2019-03-24 | Stop reason: SDUPTHER

## 2019-01-11 ENCOUNTER — OFFICE VISIT (OUTPATIENT)
Dept: FAMILY MEDICINE CLINIC | Age: 49
End: 2019-01-11

## 2019-01-11 VITALS
HEART RATE: 85 BPM | WEIGHT: 218 LBS | TEMPERATURE: 98 F | RESPIRATION RATE: 20 BRPM | HEIGHT: 64 IN | BODY MASS INDEX: 37.22 KG/M2 | OXYGEN SATURATION: 97 % | SYSTOLIC BLOOD PRESSURE: 128 MMHG | DIASTOLIC BLOOD PRESSURE: 80 MMHG

## 2019-01-11 DIAGNOSIS — E03.9 ACQUIRED HYPOTHYROIDISM: ICD-10-CM

## 2019-01-11 DIAGNOSIS — E78.00 HYPERCHOLESTEROLEMIA: ICD-10-CM

## 2019-01-11 DIAGNOSIS — M79.7 FIBROMYALGIA: ICD-10-CM

## 2019-01-11 DIAGNOSIS — G89.29 CHRONIC PAIN OF LEFT KNEE: ICD-10-CM

## 2019-01-11 DIAGNOSIS — I10 ESSENTIAL HYPERTENSION, BENIGN: Primary | ICD-10-CM

## 2019-01-11 DIAGNOSIS — M25.562 CHRONIC PAIN OF LEFT KNEE: ICD-10-CM

## 2019-01-11 DIAGNOSIS — Z79.899 ENCOUNTER FOR LONG-TERM CURRENT USE OF MEDICATION: ICD-10-CM

## 2019-01-11 RX ORDER — TRAMADOL HYDROCHLORIDE 50 MG/1
50 TABLET ORAL
Qty: 270 TAB | Refills: 1 | Status: SHIPPED | OUTPATIENT
Start: 2019-01-11 | End: 2019-07-10 | Stop reason: SDUPTHER

## 2019-01-11 NOTE — PATIENT INSTRUCTIONS

## 2019-01-13 NOTE — PROGRESS NOTES
Subjective:  
 
Winsome Godwin is a 46 y.o. female who presents for follow up of hypertension, hyperlipidemia and hypothyroidism and chronic pain. Diet and Lifestyle: sedentary Home BP Monitoring: is well controlled at home. Cardiovascular ROS: taking medications as instructed, no medication side effects noted, no TIA's, no chest pain on exertion, no dyspnea on exertion, no swelling of ankles. New concerns: need med refill and due for labs. Patient Active Problem List  
 Diagnosis Date Noted  Severe obesity (BMI 35.0-39.9) 07/05/2018  Acquired hypothyroidism 01/12/2018  Hypercholesterolemia 01/25/2017  Essential hypertension, benign 01/25/2013  Fibromyalgia 11/20/2012  Cervical spondylosis 07/20/2012  Acne rosacea 02/21/2012  Esophageal reflux 02/21/2012  Mild intermittent asthma without complication 43/50/8756 Current Outpatient Medications Medication Sig Dispense Refill  traMADol (ULTRAM) 50 mg tablet Take 1 Tab by mouth every eight (8) hours as needed for Pain. 270 Tab 1  
 montelukast (SINGULAIR) 10 mg tablet TAKE 1 TABLET DAILY 90 Tab 0  
 SYNTHROID 150 mcg tablet TAKE 1 TABLET DAILY BEFORE BREAKFAST FOR HYPOTHYROIDISM (NEED OFFICE VISIT) 90 Tab 0  
 Estradiol (DIVIGEL) 1 mg/gram (0.1 %) glpk 1 Packet by TransDERmal route daily. 90 Packet 3  
 doxycycline (MONODOX) 100 mg capsule Take 100 mg by mouth two (2) times a day.  methocarbamol (ROBAXIN) 750 mg tablet Take 1 Tab by mouth nightly. 90 Tab 3  
 ergocalciferol (ERGOCALCIFEROL) 50,000 unit capsule Take 1 Cap by mouth every seven (7) days. Indications: Vitamin D Deficiency 15 Cap 3  
 losartan-hydroCHLOROthiazide (HYZAAR) 100-25 mg per tablet TAKE 1 TABLET DAILY 90 Tab 3  
 valACYclovir (VALTREX) 500 mg tablet Take 500 mg by mouth daily.  acetaminophen (TYLENOL EXTRA STRENGTH) 500 mg tablet Take 1,000 mg by mouth every six (6) hours as needed for Pain.  multivitamin (ONE A DAY) tablet Take 1 Tab by mouth daily.  omeprazole (PRILOSEC) 20 mg capsule Take 20 mg by mouth daily.  EPINEPHrine (EPIPEN) 0.3 mg/0.3 mL injection 0.3 mL by IntraMUSCular route once as needed for up to 2 doses. 2 Syringe 3  
 albuterol (VENTOLIN HFA) 90 mcg/actuation inhaler Take 2 Puffs by inhalation every four (4) hours as needed for Wheezing. 3 Inhaler 1 Allergies Allergen Reactions  Monsel's [Ferric Subsulfate] Swelling Extreme burning, skin sloughing  Other Plant, Animal, Environmental Unknown (comments) Vinegar  Premarin [Conjugated Estrogens] Itching Past Medical History:  
Diagnosis Date  Abnormal Pap smear 1/2/2012 FUNMI -evaluation negative  Arthritis  Asthma  Atypical squamous cells of undetermined significance (ASCUS) on Papanicolaou smear of cervix 10/08/15 HPV Negative  Autoimmune disease ([facility] [address]   
 sjogrens  Bartholin's gland cyst   
 right marsupialization  Diverticulitis   
 in the past- has seen Dr. Samara Sánchez for evaluation  Dyspareunia  Endometriosis  Fibromyalgia  GERD (gastroesophageal reflux disease)  Hiatal hernia  Hypercholesterolemia 1/25/2017  Hypertension  Hypothyroid  IBS (irritable bowel syndrome)  Lichen sclerosus  Pelvic pain  Rosacea  Sjogren's syndrome ([facility] [address] sees Dr. Kay Combs  Thyroid disease Hypo  Unspecified sleep apnea   
 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  
 vulvar biopsy done-suggestive of LS Past Surgical History:  
Procedure Laterality Date  HX CHOLECYSTECTOMY  HX COLPOSCOPY  2/14/12 No dysplasia  HX DILATION AND CURETTAGE  2/14/2012  
 benign tissue- problems with anesthesia afterwards  HX HYSTERECTOMY  HX LAPAROSCOPIC SUPRACERVICAL HYSTERECTOMY  11/25/2013  
 w/ RSO  HX OOPHORECTOMY  HX ORTHOPAEDIC Left ACLX2  
 HX OTHER SURGICAL Bartholins Marsupialization  HX OTHER SURGICAL  02/07 LSO--Laparotomy w/ [place] also  HX OTHER SURGICAL  11/08  
 vulvar biopsy Family History Problem Relation Age of Onset  Heart Disease Mother   
     pacemaker, defibrillator  Osteoporosis Mother  Diabetes Brother  Hypertension Father Social History Tobacco Use  Smoking status: Never Smoker  Smokeless tobacco: Never Used Substance Use Topics  Alcohol use: Yes Alcohol/week: 0.0 oz  
  Comment: very rarely-less than 5X/year Review of Systems, additional: 
Pertinent items are noted in HPI. Objective:  
 
Visit Vitals /80 (BP 1 Location: Left arm, BP Patient Position: Sitting) Pulse 85 Temp 98 °F (36.7 °C) (Oral) Resp 20 Ht 5' 4\" (1.626 m) Wt 218 lb (98.9 kg) LMP 11/05/2013 SpO2 97% BMI 37.42 kg/m² Appearance: alert, well appearing, and in no distress and overweight. General exam: CVS exam BP noted to be well controlled today in office, S1, S2 normal, no gallop, no murmur, chest clear, no JVD, no HSM, no edema. Lab review: orders written for new lab studies as appropriate; see orders. Assessment/Plan:  
 
hypertension stable, hyperlipidemia . orders and follow up as documented in patient record. ICD-10-CM ICD-9-CM 1. Essential hypertension, benign I10 401.1 2. Hypercholesterolemia E78.00 272.0 LIPID PANEL 3. Acquired hypothyroidism E03.9 244.9 TSH 3RD GENERATION  
   T4, FREE 4. Encounter for long-term current use of medication E29.298 J08.84 METABOLIC PANEL, COMPREHENSIVE 5. Fibromyalgia M79.7 729.1 traMADol (ULTRAM) 50 mg tablet 6. Chronic pain of left knee M25.562 719.46 traMADol (ULTRAM) 50 mg tablet G89.29 338.29 Return for fasting labs. Med refill provided.

## 2019-02-15 LAB
ALBUMIN SERPL-MCNC: 4.4 G/DL (ref 3.5–5.5)
ALBUMIN/GLOB SERPL: 1.7 {RATIO} (ref 1.2–2.2)
ALP SERPL-CCNC: 82 IU/L (ref 39–117)
ALT SERPL-CCNC: 12 IU/L (ref 0–32)
AST SERPL-CCNC: 19 IU/L (ref 0–40)
BILIRUB SERPL-MCNC: 0.4 MG/DL (ref 0–1.2)
BUN SERPL-MCNC: 11 MG/DL (ref 6–24)
BUN/CREAT SERPL: 15 (ref 9–23)
CALCIUM SERPL-MCNC: 9.6 MG/DL (ref 8.7–10.2)
CHLORIDE SERPL-SCNC: 98 MMOL/L (ref 96–106)
CHOLEST SERPL-MCNC: 189 MG/DL (ref 100–199)
CO2 SERPL-SCNC: 30 MMOL/L (ref 20–29)
CREAT SERPL-MCNC: 0.71 MG/DL (ref 0.57–1)
GLOBULIN SER CALC-MCNC: 2.6 G/DL (ref 1.5–4.5)
GLUCOSE SERPL-MCNC: 106 MG/DL (ref 65–99)
HDLC SERPL-MCNC: 65 MG/DL
INTERPRETATION, 910389: NORMAL
LDLC SERPL CALC-MCNC: 109 MG/DL (ref 0–99)
POTASSIUM SERPL-SCNC: 3.7 MMOL/L (ref 3.5–5.2)
PROT SERPL-MCNC: 7 G/DL (ref 6–8.5)
SODIUM SERPL-SCNC: 144 MMOL/L (ref 134–144)
T4 FREE SERPL-MCNC: 1.67 NG/DL (ref 0.82–1.77)
TRIGL SERPL-MCNC: 74 MG/DL (ref 0–149)
TSH SERPL DL<=0.005 MIU/L-ACNC: 2.14 UIU/ML (ref 0.45–4.5)
VLDLC SERPL CALC-MCNC: 15 MG/DL (ref 5–40)

## 2019-02-18 DIAGNOSIS — I10 ESSENTIAL HYPERTENSION, BENIGN: ICD-10-CM

## 2019-02-18 RX ORDER — LOSARTAN POTASSIUM AND HYDROCHLOROTHIAZIDE 25; 100 MG/1; MG/1
TABLET ORAL
Qty: 90 TAB | Refills: 3 | Status: SHIPPED | OUTPATIENT
Start: 2019-02-18 | End: 2019-04-03 | Stop reason: SDUPTHER

## 2019-02-20 NOTE — PROGRESS NOTES
Labs show improved cholesterol numbers. Continue to follow low fat diet. Borderline high sugar. Good thyroid level. Limit sugar and starches in diet. Normal electrolytes, kidney, and liver function tests.

## 2019-03-01 ENCOUNTER — OFFICE VISIT (OUTPATIENT)
Dept: FAMILY MEDICINE CLINIC | Age: 49
End: 2019-03-01

## 2019-03-01 VITALS
HEIGHT: 64 IN | WEIGHT: 215.2 LBS | SYSTOLIC BLOOD PRESSURE: 106 MMHG | HEART RATE: 82 BPM | RESPIRATION RATE: 20 BRPM | DIASTOLIC BLOOD PRESSURE: 60 MMHG | OXYGEN SATURATION: 95 % | TEMPERATURE: 99.1 F | BODY MASS INDEX: 36.74 KG/M2

## 2019-03-01 DIAGNOSIS — M25.562 CHRONIC PAIN OF LEFT KNEE: ICD-10-CM

## 2019-03-01 DIAGNOSIS — K57.92 DIVERTICULITIS: Primary | ICD-10-CM

## 2019-03-01 DIAGNOSIS — G89.29 CHRONIC PAIN OF LEFT KNEE: ICD-10-CM

## 2019-03-01 DIAGNOSIS — R07.89 PRESSURE IN CHEST: ICD-10-CM

## 2019-03-01 RX ORDER — CIPROFLOXACIN 500 MG/1
500 TABLET ORAL 2 TIMES DAILY
Qty: 20 TAB | Refills: 0 | Status: SHIPPED | OUTPATIENT
Start: 2019-03-01 | End: 2019-03-11

## 2019-03-01 RX ORDER — METRONIDAZOLE 500 MG/1
500 TABLET ORAL 2 TIMES DAILY
Qty: 20 TAB | Refills: 0 | Status: SHIPPED | OUTPATIENT
Start: 2019-03-01 | End: 2019-03-11

## 2019-03-01 NOTE — PROGRESS NOTES
Identified pt with two pt identifiers(name and ). Chief Complaint   Patient presents with    Diverticulitis     she said it started 2 weeks ago - she had some medicine and took it but ran out   Oleta.Maurice Stress     mother and dog passed, her brother was sick, they are trying to buy a house    Dizziness     off and on last month    Chest Pain     pressure - it has been off and on for a long time - has been worse with trying to buy house         Health Maintenance Due   Topic    Pneumococcal 19-64 Medium Risk (1 of 1 - PPSV23)    Shingrix Vaccine Age 50> (1 of 2)    FOBT Q 1 YEAR AGE 50-75        Wt Readings from Last 3 Encounters:   19 215 lb 3.2 oz (97.6 kg)   19 218 lb (98.9 kg)   10/02/18 210 lb 6.4 oz (95.4 kg)     Temp Readings from Last 3 Encounters:   19 99.1 °F (37.3 °C) (Oral)   19 98 °F (36.7 °C) (Oral)   18 97.9 °F (36.6 °C) (Oral)     BP Readings from Last 3 Encounters:   19 106/60   19 128/80   10/02/18 124/70     Pulse Readings from Last 3 Encounters:   19 82   19 85   18 68         Learning Assessment:  :     Learning Assessment 2014   PRIMARY LEARNER Patient Patient Patient   HIGHEST LEVEL OF EDUCATION - PRIMARY LEARNER  - - SOME COLLEGE   BARRIERS PRIMARY LEARNER - - NONE   CO-LEARNER CAREGIVER - - No   PRIMARY LANGUAGE ENGLISH ENGLISH ENGLISH   LEARNER PREFERENCE PRIMARY DEMONSTRATION DEMONSTRATION DEMONSTRATION     - - OTHER (COMMENT)   ANSWERED BY patient patient self   RELATIONSHIP SELF SELF SELF       Depression Screening:  :     3 most recent PHQ Screens 2019   Little interest or pleasure in doing things Not at all   Feeling down, depressed, irritable, or hopeless Not at all   Total Score PHQ 2 0       Fall Risk Assessment:  :     Fall Risk Assessment, last 12 mths 2018   Able to walk? Yes   Fall in past 12 months?  No       Abuse Screening:  :     Abuse Screening Questionnaire 2018 12/9/2015   Do you ever feel afraid of your partner? N N N   Are you in a relationship with someone who physically or mentally threatens you? N N N   Is it safe for you to go home? Y Y Y       Coordination of Care Questionnaire:  :     1) Have you been to an emergency room, urgent care clinic since your last visit? no   Hospitalized since your last visit? no             2) Have you seen or consulted any other health care providers outside of [address] [place] [address] since your last visit? no  (Include any pap smears or colon screenings in this section.)    3) Do you have an Advance Directive on file? no  Are you interested in receiving information about Advance Directives? no    Reviewed record in preparation for visit and have obtained necessary documentation. Medication reconciliation up to date and corrected with patient at this time.

## 2019-03-01 NOTE — PROGRESS NOTES
HISTORY OF PRESENT ILLNESS  Lisa Rinaldi is a 46 y.o. female. Stress   Associated symptoms include chest pain and abdominal pain. Dizziness    Associated symptoms include chest pain, a fever, abdominal pain and dizziness. Chest Pain    Associated symptoms include abdominal pain, dizziness and a fever. C/O L abdo pain x few weeks. Low grade fever. Hx sigmoid diverticulitis 2012. She took leftover Cipro x 4 days but ran out. Also under stress, recent losses in past year, buying house. Some dizziness. R ear pain. Last colonoscopy 2012 by Dr Debby Mcgee. Review of Systems   Constitutional: Positive for fever. HENT: Positive for ear pain. Cardiovascular: Positive for chest pain. Gastrointestinal: Positive for abdominal pain. Musculoskeletal: Positive for joint pain. Neurological: Positive for dizziness. Visit Vitals  /60 (BP 1 Location: Left arm, BP Patient Position: Sitting) Comment: manual   Pulse 82   Temp 99.1 °F (37.3 °C) (Oral)   Resp 20   Ht 5' 4.1\" (1.628 m)   Wt 215 lb 3.2 oz (97.6 kg)   LMP 11/05/2013   SpO2 95%   BMI 36.82 kg/m²       Physical Exam   Constitutional: She appears well-developed and well-nourished. HENT:   Right Ear: External ear normal.   Left Ear: External ear normal.   Eyes: Conjunctivae are normal.   Cardiovascular: Normal rate and normal heart sounds. Pulmonary/Chest: Effort normal and breath sounds normal.   Abdominal: Soft. There is tenderness in the left lower quadrant. Vitals reviewed. ASSESSMENT and PLAN    ICD-10-CM ICD-9-CM    1. Diverticulitis K57.92 562.11 ciprofloxacin HCl (CIPRO) 500 mg tablet      metroNIDAZOLE (FLAGYL) 500 mg tablet   2. Pressure in chest R07.89 786.59 AMB POC EKG ROUTINE W/ 12 LEADS, INTER & REP   3.  Chronic pain of left knee M25.562 719.46 REFERRAL TO ORTHOPEDIC SURGERY    G89.29 338.29

## 2019-03-01 NOTE — PATIENT INSTRUCTIONS

## 2019-03-05 ENCOUNTER — TELEPHONE (OUTPATIENT)
Dept: FAMILY MEDICINE CLINIC | Age: 49
End: 2019-03-05

## 2019-03-05 DIAGNOSIS — R10.30 LOWER ABDOMINAL PAIN: Primary | ICD-10-CM

## 2019-03-05 RX ORDER — DICYCLOMINE HYDROCHLORIDE 10 MG/1
10 CAPSULE ORAL 4 TIMES DAILY
Qty: 40 CAP | Refills: 0 | Status: SHIPPED | OUTPATIENT
Start: 2019-03-05 | End: 2019-03-12

## 2019-03-05 NOTE — TELEPHONE ENCOUNTER
I spoke with pt. Requesting Galayl to try. I will also order CT scan abdo and pelvis. If get worse, go to ER.

## 2019-03-11 ENCOUNTER — OFFICE VISIT (OUTPATIENT)
Dept: FAMILY MEDICINE CLINIC | Age: 49
End: 2019-03-11

## 2019-03-11 VITALS
WEIGHT: 212.8 LBS | TEMPERATURE: 98 F | RESPIRATION RATE: 20 BRPM | SYSTOLIC BLOOD PRESSURE: 100 MMHG | HEIGHT: 64 IN | HEART RATE: 77 BPM | OXYGEN SATURATION: 97 % | BODY MASS INDEX: 36.33 KG/M2 | DIASTOLIC BLOOD PRESSURE: 60 MMHG

## 2019-03-11 DIAGNOSIS — R10.9 ABDOMINAL PAIN, UNSPECIFIED ABDOMINAL LOCATION: Primary | ICD-10-CM

## 2019-03-11 DIAGNOSIS — R52 BODY ACHES: ICD-10-CM

## 2019-03-11 DIAGNOSIS — R53.83 FATIGUE, UNSPECIFIED TYPE: ICD-10-CM

## 2019-03-11 DIAGNOSIS — R14.0 BLOATING: ICD-10-CM

## 2019-03-11 LAB
QUICKVUE INFLUENZA TEST: NEGATIVE
VALID INTERNAL CONTROL?: YES

## 2019-03-11 NOTE — PROGRESS NOTES
Identified pt with two pt identifiers(name and ). Chief Complaint   Patient presents with    Diverticulitis     she is hurting all over     Fatigue     she can sleep all the time     Cough     she had bad cough     Diarrhea     one night 8 days ago - still soft     Shoulder Pain     left shoulder is popping - she has not hurt it     Diet Concern     she has been eating eggs and toast    Headache        Health Maintenance Due   Topic    Pneumococcal 19-64 Medium Risk (1 of 1 - PPSV23)    Shingrix Vaccine Age 50> (1 of 2)    FOBT Q 1 YEAR AGE 50-75        Wt Readings from Last 3 Encounters:   19 212 lb 12.8 oz (96.5 kg)   19 215 lb 3.2 oz (97.6 kg)   19 218 lb (98.9 kg)     Temp Readings from Last 3 Encounters:   19 98 °F (36.7 °C) (Oral)   19 99.1 °F (37.3 °C) (Oral)   19 98 °F (36.7 °C) (Oral)     BP Readings from Last 3 Encounters:   19 100/60   19 106/60   19 128/80     Pulse Readings from Last 3 Encounters:   19 77   19 82   19 85         Learning Assessment:  :     Learning Assessment 2014   PRIMARY LEARNER Patient Patient Patient   HIGHEST LEVEL OF EDUCATION - PRIMARY LEARNER  - - SOME COLLEGE   BARRIERS PRIMARY LEARNER - - NONE   CO-LEARNER CAREGIVER - - No   PRIMARY LANGUAGE ENGLISH ENGLISH ENGLISH   LEARNER PREFERENCE PRIMARY DEMONSTRATION DEMONSTRATION DEMONSTRATION     - - OTHER (COMMENT)   ANSWERED BY patient patient self   RELATIONSHIP SELF SELF SELF       Depression Screening:  :     3 most recent PHQ Screens 2019   Little interest or pleasure in doing things Not at all   Feeling down, depressed, irritable, or hopeless Not at all   Total Score PHQ 2 0       Fall Risk Assessment:  :     Fall Risk Assessment, last 12 mths 2018   Able to walk? Yes   Fall in past 12 months?  No       Abuse Screening:  :     Abuse Screening Questionnaire 3/11/2019 2018 2017 2015   Do you ever feel afraid of your partner? N N N N   Are you in a relationship with someone who physically or mentally threatens you? N N N N   Is it safe for you to go home? Y Y Y Y       Coordination of Care Questionnaire:  :     1) Have you been to an emergency room, urgent care clinic since your last visit? no   Hospitalized since your last visit? no             2) Have you seen or consulted any other health care providers outside of [facility] since your last visit? no  (Include any pap smears or colon screenings in this section.)    3) Do you have an Advance Directive on file? no  Are you interested in receiving information about Advance Directives? no    Reviewed record in preparation for visit and have obtained necessary documentation. Medication reconciliation up to date and corrected with patient at this time.

## 2019-03-12 LAB
BASOPHILS # BLD AUTO: 0 X10E3/UL (ref 0–0.2)
BASOPHILS NFR BLD AUTO: 0 %
EOSINOPHIL # BLD AUTO: 0.3 X10E3/UL (ref 0–0.4)
EOSINOPHIL NFR BLD AUTO: 3 %
ERYTHROCYTE [DISTWIDTH] IN BLOOD BY AUTOMATED COUNT: 15 % (ref 12.3–15.4)
HCT VFR BLD AUTO: 40 % (ref 34–46.6)
HGB BLD-MCNC: 13.6 G/DL (ref 11.1–15.9)
IMM GRANULOCYTES # BLD AUTO: 0 X10E3/UL (ref 0–0.1)
IMM GRANULOCYTES NFR BLD AUTO: 0 %
LYMPHOCYTES # BLD AUTO: 2.8 X10E3/UL (ref 0.7–3.1)
LYMPHOCYTES NFR BLD AUTO: 27 %
MCH RBC QN AUTO: 28.6 PG (ref 26.6–33)
MCHC RBC AUTO-ENTMCNC: 34 G/DL (ref 31.5–35.7)
MCV RBC AUTO: 84 FL (ref 79–97)
MONOCYTES # BLD AUTO: 0.8 X10E3/UL (ref 0.1–0.9)
MONOCYTES NFR BLD AUTO: 8 %
NEUTROPHILS # BLD AUTO: 6.3 X10E3/UL (ref 1.4–7)
NEUTROPHILS NFR BLD AUTO: 62 %
PLATELET # BLD AUTO: 364 X10E3/UL (ref 150–379)
RBC # BLD AUTO: 4.76 X10E6/UL (ref 3.77–5.28)
WBC # BLD AUTO: 10.2 X10E3/UL (ref 3.4–10.8)

## 2019-03-12 NOTE — PROGRESS NOTES
HISTORY OF PRESENT ILLNESS  Gurinder Grimaldo is a 46 y.o. female. HPI  FU abdominal pain, fatigue, coughing. She has appt for CT scan abdomen on Wed. Still taking antibiotics for presumed diverticulitis. Stools are soft. She stopped taking PPI and tramadol while on antibiotics. + bloated. Achy. Chills. Review of Systems   Constitutional: Positive for malaise/fatigue. HENT: Positive for congestion. Respiratory: Positive for cough. Gastrointestinal: Positive for abdominal pain and nausea. Musculoskeletal: Positive for myalgias. Patient Active Problem List   Diagnosis Code    Acne rosacea L71.9    Esophageal reflux K21.9    Mild intermittent asthma without complication R96.89    Cervical spondylosis M47.812    Fibromyalgia M79.7    Essential hypertension, benign I10    Hypercholesterolemia E78.00    Acquired hypothyroidism E03.9    Severe obesity (BMI 35.0-39. 9) E66.01     Current Outpatient Medications   Medication Sig Dispense Refill    B.infantis-B.ani-B.long-B.bifi (PROBIOTIC 4X) 10-15 mg TbEC Take  by mouth.  losartan-hydroCHLOROthiazide (HYZAAR) 100-25 mg per tablet TAKE 1 TABLET DAILY 90 Tab 3    SYNTHROID 150 mcg tablet TAKE 1 TABLET DAILY BEFORE BREAKFAST FOR HYPOTHYROIDISM (NEED OFFICE VISIT) 90 Tab 0    Estradiol (DIVIGEL) 1 mg/gram (0.1 %) glpk 1 Packet by TransDERmal route daily. 90 Packet 3    ergocalciferol (ERGOCALCIFEROL) 50,000 unit capsule Take 1 Cap by mouth every seven (7) days. Indications: Vitamin D Deficiency 15 Cap 3    traMADol (ULTRAM) 50 mg tablet Take 1 Tab by mouth every eight (8) hours as needed for Pain. 270 Tab 1    montelukast (SINGULAIR) 10 mg tablet TAKE 1 TABLET DAILY 90 Tab 0    doxycycline (MONODOX) 100 mg capsule Take 100 mg by mouth two (2) times a day.  methocarbamol (ROBAXIN) 750 mg tablet Take 1 Tab by mouth nightly. 90 Tab 3    EPINEPHrine (EPIPEN) 0.3 mg/0.3 mL injection 0.3 mL by IntraMUSCular route once as needed for up to 2 doses. 2 Syringe 3    albuterol (VENTOLIN HFA) 90 mcg/actuation inhaler Take 2 Puffs by inhalation every four (4) hours as needed for Wheezing. 3 Inhaler 1    valACYclovir (VALTREX) 500 mg tablet Take 500 mg by mouth daily.  acetaminophen (TYLENOL EXTRA STRENGTH) 500 mg tablet Take 1,000 mg by mouth every six (6) hours as needed for Pain.  multivitamin (ONE A DAY) tablet Take 1 Tab by mouth daily.  omeprazole (PRILOSEC) 20 mg capsule Take 20 mg by mouth daily. Physical Exam   Constitutional: She is oriented to person, place, and time. She appears well-developed and well-nourished. HENT:   Right Ear: External ear normal.   Left Ear: External ear normal.   Mouth/Throat: Oropharynx is clear and moist.   Eyes: Conjunctivae are normal.   Neck: Neck supple. Cardiovascular: Normal rate and normal heart sounds. Pulmonary/Chest: Effort normal and breath sounds normal.   Abdominal: Soft. She exhibits no distension and no mass. There is tenderness. There is no rebound and no guarding. Lymphadenopathy:     She has no cervical adenopathy. Neurological: She is alert and oriented to person, place, and time. Vitals reviewed. Results for orders placed or performed in visit on 03/11/19   CBC WITH AUTOMATED DIFF   Result Value Ref Range    WBC 10.2 3.4 - 10.8 x10E3/uL    RBC 4.76 3.77 - 5.28 x10E6/uL    HGB 13.6 11.1 - 15.9 g/dL    HCT 40.0 34.0 - 46.6 %    MCV 84 79 - 97 fL    MCH 28.6 26.6 - 33.0 pg    MCHC 34.0 31.5 - 35.7 g/dL    RDW 15.0 12.3 - 15.4 %    PLATELET 631 509 - 480 x10E3/uL    NEUTROPHILS 62 Not Estab. %    Lymphocytes 27 Not Estab. %    MONOCYTES 8 Not Estab. %    EOSINOPHILS 3 Not Estab. %    BASOPHILS 0 Not Estab. %    ABS. NEUTROPHILS 6.3 1.4 - 7.0 x10E3/uL    Abs Lymphocytes 2.8 0.7 - 3.1 x10E3/uL    ABS. MONOCYTES 0.8 0.1 - 0.9 x10E3/uL    ABS. EOSINOPHILS 0.3 0.0 - 0.4 x10E3/uL    ABS. BASOPHILS 0.0 0.0 - 0.2 x10E3/uL    IMMATURE GRANULOCYTES 0 Not Estab. %    ABS. IMM. GRANS. 0.0 0.0 - 0.1 x10E3/uL   AMB POC RAPID INFLUENZA TEST   Result Value Ref Range    VALID INTERNAL CONTROL POC Yes     QuickVue Influenza test Negative Negative       ASSESSMENT and PLAN    ICD-10-CM ICD-9-CM    1. Abdominal pain, unspecified abdominal location R10.9 789.00 REFERRAL TO GASTROENTEROLOGY      CBC WITH AUTOMATED DIFF   2. Bloating R14.0 787.3    3. Fatigue, unspecified type R53.83 780.79 AMB POC RAPID INFLUENZA TEST   4. Body aches R52 780.96 AMB POC RAPID INFLUENZA TEST     Check CBC. Await CT scan.

## 2019-03-13 ENCOUNTER — HOSPITAL ENCOUNTER (OUTPATIENT)
Dept: CT IMAGING | Age: 49
Discharge: HOME | End: 2019-03-13
Attending: FAMILY MEDICINE
Payer: COMMERCIAL

## 2019-03-13 DIAGNOSIS — R10.30 LOWER ABDOMINAL PAIN: ICD-10-CM

## 2019-03-13 PROCEDURE — 74177 CT ABD & PELVIS W/CONTRAST: CPT

## 2019-03-13 PROCEDURE — 74011636320 HC RX REV CODE- 636/320: Performed by: FAMILY MEDICINE

## 2019-03-13 RX ADMIN — IOPAMIDOL 100 ML: 755 INJECTION, SOLUTION INTRAVENOUS at 09:54

## 2019-03-24 DIAGNOSIS — E03.9 ACQUIRED HYPOTHYROIDISM: ICD-10-CM

## 2019-03-24 RX ORDER — LEVOTHYROXINE SODIUM 150 MCG
150 TABLET ORAL
Qty: 90 TAB | Refills: 1 | Status: SHIPPED | OUTPATIENT
Start: 2019-03-24 | End: 2019-07-10 | Stop reason: SDUPTHER

## 2019-06-30 DIAGNOSIS — M79.7 FIBROMYALGIA: ICD-10-CM

## 2019-07-01 DIAGNOSIS — I10 ESSENTIAL HYPERTENSION, BENIGN: ICD-10-CM

## 2019-07-01 RX ORDER — LOSARTAN POTASSIUM AND HYDROCHLOROTHIAZIDE 25; 100 MG/1; MG/1
TABLET ORAL
Qty: 90 TAB | Refills: 0 | Status: SHIPPED | OUTPATIENT
Start: 2019-07-01 | End: 2019-10-02 | Stop reason: SDUPTHER

## 2019-07-01 RX ORDER — METHOCARBAMOL 750 MG/1
TABLET, FILM COATED ORAL
Qty: 90 TAB | Refills: 0 | Status: SHIPPED | OUTPATIENT
Start: 2019-07-01 | End: 2019-09-26 | Stop reason: SDUPTHER

## 2019-07-09 DIAGNOSIS — I10 ESSENTIAL HYPERTENSION, BENIGN: ICD-10-CM

## 2019-07-09 RX ORDER — LOSARTAN POTASSIUM AND HYDROCHLOROTHIAZIDE 25; 100 MG/1; MG/1
TABLET ORAL
Qty: 90 TAB | Refills: 0 | OUTPATIENT
Start: 2019-07-09

## 2019-07-10 ENCOUNTER — OFFICE VISIT (OUTPATIENT)
Dept: FAMILY MEDICINE CLINIC | Age: 49
End: 2019-07-10

## 2019-07-10 VITALS
HEART RATE: 70 BPM | DIASTOLIC BLOOD PRESSURE: 84 MMHG | HEIGHT: 64 IN | RESPIRATION RATE: 20 BRPM | OXYGEN SATURATION: 96 % | WEIGHT: 216.2 LBS | TEMPERATURE: 98.4 F | SYSTOLIC BLOOD PRESSURE: 106 MMHG | BODY MASS INDEX: 36.91 KG/M2

## 2019-07-10 DIAGNOSIS — Z79.899 ENCOUNTER FOR LONG-TERM CURRENT USE OF MEDICATION: ICD-10-CM

## 2019-07-10 DIAGNOSIS — M79.7 FIBROMYALGIA: ICD-10-CM

## 2019-07-10 DIAGNOSIS — B00.9 HERPES SIMPLEX INFECTION OF SKIN: Primary | ICD-10-CM

## 2019-07-10 DIAGNOSIS — E03.9 ACQUIRED HYPOTHYROIDISM: ICD-10-CM

## 2019-07-10 DIAGNOSIS — E78.00 HYPERCHOLESTEROLEMIA: ICD-10-CM

## 2019-07-10 DIAGNOSIS — R73.9 HYPERGLYCEMIA: ICD-10-CM

## 2019-07-10 DIAGNOSIS — G89.29 CHRONIC PAIN OF LEFT KNEE: ICD-10-CM

## 2019-07-10 DIAGNOSIS — M25.562 CHRONIC PAIN OF LEFT KNEE: ICD-10-CM

## 2019-07-10 RX ORDER — LEVOTHYROXINE SODIUM 150 MCG
150 TABLET ORAL
Qty: 90 TAB | Refills: 1 | Status: SHIPPED | OUTPATIENT
Start: 2019-07-10 | End: 2020-01-03

## 2019-07-10 RX ORDER — TRAMADOL HYDROCHLORIDE 50 MG/1
50 TABLET ORAL
Qty: 270 TAB | Refills: 1 | Status: SHIPPED | OUTPATIENT
Start: 2019-07-10 | End: 2020-01-13 | Stop reason: SDUPTHER

## 2019-07-10 RX ORDER — VALACYCLOVIR HYDROCHLORIDE 500 MG/1
500 TABLET, FILM COATED ORAL DAILY
Qty: 90 TAB | Refills: 1 | Status: SHIPPED | OUTPATIENT
Start: 2019-07-10 | End: 2020-01-03

## 2019-07-10 NOTE — PROGRESS NOTES
Subjective:     Maíra Grace is a 44 y.o. female who presents for follow up of hypertension and hyperlipidemia. Diet and Lifestyle: generally follows a low fat low cholesterol diet, generally follows a low sodium diet, sedentary, nonsmoker  Home BP Monitoring: is well controlled at home. Cardiovascular ROS: taking medications as instructed, no medication side effects noted, no TIA's, no chest pain on exertion, no dyspnea on exertion, no swelling of ankles. New concerns: she needs med refills. Plan to check labs in August.     Patient Active Problem List    Diagnosis Date Noted    Severe obesity (BMI 35.0-39.9) 07/05/2018    Acquired hypothyroidism 01/12/2018    Hypercholesterolemia 01/25/2017    Essential hypertension, benign 01/25/2013    Fibromyalgia 11/20/2012    Cervical spondylosis 07/20/2012    Acne rosacea 02/21/2012    Esophageal reflux 02/21/2012    Mild intermittent asthma without complication 43/30/2151     Current Outpatient Medications   Medication Sig Dispense Refill    SYNTHROID 150 mcg tablet Take 1 Tab by mouth Daily (before breakfast). 90 Tab 1    valACYclovir (VALTREX) 500 mg tablet Take 1 Tab by mouth daily. 90 Tab 1    traMADol (ULTRAM) 50 mg tablet Take 1 Tab by mouth every eight (8) hours as needed for Pain for up to 180 days. Indications: Pain 270 Tab 1    losartan-hydroCHLOROthiazide (HYZAAR) 100-25 mg per tablet TAKE 1 TABLET BY MOUTH EVERY DAY 90 Tab 0    B.infantis-B.ani-B.long-B.bifi (PROBIOTIC 4X) 10-15 mg TbEC Take  by mouth.  montelukast (SINGULAIR) 10 mg tablet TAKE 1 TABLET DAILY 90 Tab 0    Estradiol (DIVIGEL) 1 mg/gram (0.1 %) glpk 1 Packet by TransDERmal route daily. 90 Packet 3    doxycycline (MONODOX) 100 mg capsule Take 100 mg by mouth two (2) times a day.  ergocalciferol (ERGOCALCIFEROL) 50,000 unit capsule Take 1 Cap by mouth every seven (7) days.  Indications: Vitamin D Deficiency 15 Cap 3    multivitamin (ONE A DAY) tablet Take 1 Tab by mouth daily.  omeprazole (PRILOSEC) 20 mg capsule Take 20 mg by mouth daily.  methocarbamol (ROBAXIN) 750 mg tablet TAKE 1 TABLET BY MOUTH EVERY DAY AT NIGHT 90 Tab 0    EPINEPHrine (EPIPEN) 0.3 mg/0.3 mL injection 0.3 mL by IntraMUSCular route once as needed for up to 2 doses. 2 Syringe 3    albuterol (VENTOLIN HFA) 90 mcg/actuation inhaler Take 2 Puffs by inhalation every four (4) hours as needed for Wheezing. 3 Inhaler 1    acetaminophen (TYLENOL EXTRA STRENGTH) 500 mg tablet Take 1,000 mg by mouth every six (6) hours as needed for Pain.        Allergies   Allergen Reactions    Monsel's [Ferric Subsulfate] Swelling     Extreme burning, skin sloughing    Other Plant, Animal, Environmental Unknown (comments)     Vinegar    Premarin [Conjugated Estrogens] Itching     Past Medical History:   Diagnosis Date    Abnormal Pap smear 1/2/2012    FUNMI -evaluation negative    Arthritis     Asthma     Atypical squamous cells of undetermined significance (ASCUS) on Papanicolaou smear of cervix 10/08/15    HPV Negative    Autoimmune disease ([facility] [address]     sjogrens    Bartholin's gland cyst     right marsupialization    Diverticulitis     in the past- has seen Dr. Drake Pineda for evaluation    Dyspareunia     Endometriosis     Fibromyalgia     GERD (gastroesophageal reflux disease)     Hiatal hernia     Hypercholesterolemia 1/25/2017    Hypertension     Hypothyroid     IBS (irritable bowel syndrome)     Lichen sclerosus     Pelvic pain     Rosacea     Sjogren's syndrome ([facility] [address]     sees Dr. 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    vulvar biopsy done-suggestive of LS     Past Surgical History:   Procedure Laterality Date    HX CHOLECYSTECTOMY      HX COLPOSCOPY  2/14/12    No dysplasia    HX DILATION AND CURETTAGE  2/14/2012    benign tissue- problems with anesthesia afterwards    HX HYSTERECTOMY      HX LAPAROSCOPIC SUPRACERVICAL HYSTERECTOMY  11/25/2013    w/ RSO    HX OOPHORECTOMY      HX ORTHOPAEDIC Left     ACLX2    HX OTHER SURGICAL      Bartholins Marsupialization    HX OTHER SURGICAL  02/07    LSO--Laparotomy w/ [place] also    HX OTHER SURGICAL  11/08    vulvar biopsy     Family History   Problem Relation Age of Onset    Heart Disease Mother         pacemaker, defibrillator    Osteoporosis Mother     Diabetes Brother     Hypertension Father      Social History     Tobacco Use    Smoking status: Never Smoker    Smokeless tobacco: Never Used   Substance Use Topics    Alcohol use: Yes     Alcohol/week: 0.0 oz     Comment: very rarely-less than 5X/year             Review of Systems, additional:  Pertinent items are noted in HPI. Objective:     Visit Vitals  /84 (BP 1 Location: Left arm, BP Patient Position: Sitting) Comment: manual   Pulse 70   Temp 98.4 °F (36.9 °C) (Oral)   Resp 20   Ht 5' 4\" (1.626 m)   Wt 216 lb 3.2 oz (98.1 kg)   LMP 11/05/2013   SpO2 96%   BMI 37.11 kg/m²     Appearance: alert, well appearing, and in no distress and overweight. General exam: CVS exam BP noted to be well controlled today in office, S1, S2 normal, no gallop, no murmur, chest clear, no JVD, no HSM, no edema. Lab review: orders written for new lab studies as appropriate; see orders. Assessment/Plan:     hypertension well controlled, hyperlipidemia . orders and follow up as documented in patient record. ICD-10-CM ICD-9-CM    1. Herpes simplex infection of skin B00.9 054.9 valACYclovir (VALTREX) 500 mg tablet   2. Acquired hypothyroidism E03.9 244.9 SYNTHROID 150 mcg tablet      TSH 3RD GENERATION      T4, FREE   3. Fibromyalgia M79.7 729.1 traMADol (ULTRAM) 50 mg tablet   4. Chronic pain of left knee M25.562 719.46 traMADol (ULTRAM) 50 mg tablet    G89.29 338.29    5. Hypercholesterolemia E78.00 272.0 LIPID PANEL   6.  Encounter for long-term current use of medication B32.962 D67.71 METABOLIC PANEL, COMPREHENSIVE      CBC WITH AUTOMATED DIFF   7. Hyperglycemia R73.9 790.29 HEMOGLOBIN A1C WITH EAG     Med refills ordered.   Return for fasting labs in August.

## 2019-07-10 NOTE — PROGRESS NOTES
Identified pt with two pt identifiers(name and ). Chief Complaint   Patient presents with    Medication Refill        Health Maintenance Due   Topic    Pneumococcal 0-64 years (1 of 1 - PPSV23)    Shingrix Vaccine Age 50> (1 of 2)    FOBT Q 1 YEAR AGE 50-75        Wt Readings from Last 3 Encounters:   07/10/19 216 lb 3.2 oz (98.1 kg)   19 212 lb 12.8 oz (96.5 kg)   19 215 lb 3.2 oz (97.6 kg)     Temp Readings from Last 3 Encounters:   07/10/19 98.4 °F (36.9 °C) (Oral)   19 98 °F (36.7 °C) (Oral)   19 99.1 °F (37.3 °C) (Oral)     BP Readings from Last 3 Encounters:   07/10/19 106/84   19 100/60   19 106/60     Pulse Readings from Last 3 Encounters:   07/10/19 70   19 77   19 82         Learning Assessment:  :     Learning Assessment 2014   PRIMARY LEARNER Patient Patient Patient   HIGHEST LEVEL OF EDUCATION - PRIMARY LEARNER  - - SOME COLLEGE   BARRIERS PRIMARY LEARNER - - NONE   CO-LEARNER CAREGIVER - - No   PRIMARY LANGUAGE ENGLISH ENGLISH ENGLISH   LEARNER PREFERENCE PRIMARY DEMONSTRATION DEMONSTRATION DEMONSTRATION     - - OTHER (COMMENT)   ANSWERED BY patient patient self   RELATIONSHIP SELF SELF SELF       Depression Screening:  :     3 most recent PHQ Screens 2019   Little interest or pleasure in doing things Not at all   Feeling down, depressed, irritable, or hopeless Not at all   Total Score PHQ 2 0       Fall Risk Assessment:  :     Fall Risk Assessment, last 12 mths 2018   Able to walk? Yes   Fall in past 12 months? No       Abuse Screening:  :     Abuse Screening Questionnaire 3/11/2019 2018 2017 2015   Do you ever feel afraid of your partner? N N N N   Are you in a relationship with someone who physically or mentally threatens you? N N N N   Is it safe for you to go home?  Angelica Allen       Coordination of Care Questionnaire:  :     1) Have you been to an emergency room, urgent care clinic since your last visit? no   Hospitalized since your last visit? no             2) Have you seen or consulted any other health care providers outside of [address] [place] [address] since your last visit? no  (Include any pap smears or colon screenings in this section.)    3) Do you have an Advance Directive on file? no  Are you interested in receiving information about Advance Directives? no    Reviewed record in preparation for visit and have obtained necessary documentation. Medication reconciliation up to date and corrected with patient at this time.

## 2019-09-26 DIAGNOSIS — M79.7 FIBROMYALGIA: ICD-10-CM

## 2019-09-26 RX ORDER — METHOCARBAMOL 750 MG/1
TABLET, FILM COATED ORAL
Qty: 90 TAB | Refills: 0 | Status: SHIPPED | OUTPATIENT
Start: 2019-09-26 | End: 2020-01-08

## 2019-10-02 DIAGNOSIS — I10 ESSENTIAL HYPERTENSION, BENIGN: ICD-10-CM

## 2019-10-02 RX ORDER — LOSARTAN POTASSIUM AND HYDROCHLOROTHIAZIDE 25; 100 MG/1; MG/1
TABLET ORAL
Qty: 90 TAB | Refills: 0 | Status: SHIPPED | OUTPATIENT
Start: 2019-10-02 | End: 2019-12-30

## 2019-10-18 ENCOUNTER — OFFICE VISIT (OUTPATIENT)
Dept: FAMILY MEDICINE CLINIC | Age: 49
End: 2019-10-18

## 2019-10-18 VITALS
WEIGHT: 211 LBS | SYSTOLIC BLOOD PRESSURE: 112 MMHG | DIASTOLIC BLOOD PRESSURE: 80 MMHG | RESPIRATION RATE: 20 BRPM | HEART RATE: 80 BPM | HEIGHT: 64 IN | BODY MASS INDEX: 36.02 KG/M2 | OXYGEN SATURATION: 97 % | TEMPERATURE: 98 F

## 2019-10-18 DIAGNOSIS — G89.29 CHRONIC PAIN OF LEFT KNEE: ICD-10-CM

## 2019-10-18 DIAGNOSIS — M79.7 FIBROMYALGIA: ICD-10-CM

## 2019-10-18 DIAGNOSIS — M25.562 CHRONIC PAIN OF LEFT KNEE: ICD-10-CM

## 2019-10-18 RX ORDER — TRAMADOL HYDROCHLORIDE 50 MG/1
50 TABLET ORAL
Qty: 270 TAB | Refills: 1 | Status: CANCELLED | OUTPATIENT
Start: 2019-10-18 | End: 2020-04-15

## 2019-10-18 RX ORDER — NAPROXEN SODIUM 220 MG
220 TABLET ORAL 2 TIMES DAILY WITH MEALS
COMMUNITY
End: 2021-08-27 | Stop reason: ALTCHOICE

## 2019-10-18 NOTE — PROGRESS NOTES
Identified pt with two pt identifiers(name and ). Chief Complaint   Patient presents with    Back Pain    Medication Refill        Health Maintenance Due   Topic    Pneumococcal 0-64 years (1 of 1 - PPSV23)    Shingrix Vaccine Age 50> (1 of 2)    FOBT Q 1 YEAR AGE 50-75        Wt Readings from Last 3 Encounters:   10/18/19 211 lb (95.7 kg)   07/10/19 216 lb 3.2 oz (98.1 kg)   19 212 lb 12.8 oz (96.5 kg)     Temp Readings from Last 3 Encounters:   10/18/19 98 °F (36.7 °C) (Oral)   07/10/19 98.4 °F (36.9 °C) (Oral)   19 98 °F (36.7 °C) (Oral)     BP Readings from Last 3 Encounters:   10/18/19 112/80   07/10/19 106/84   19 100/60     Pulse Readings from Last 3 Encounters:   10/18/19 80   07/10/19 70   19 77         Learning Assessment:  :     Learning Assessment 2014   PRIMARY LEARNER Patient Patient Patient   HIGHEST LEVEL OF EDUCATION - PRIMARY LEARNER  - - SOME COLLEGE   BARRIERS PRIMARY LEARNER - - NONE   CO-LEARNER CAREGIVER - - No   PRIMARY LANGUAGE ENGLISH ENGLISH ENGLISH   LEARNER PREFERENCE PRIMARY DEMONSTRATION DEMONSTRATION DEMONSTRATION     - - OTHER (COMMENT)   ANSWERED BY patient patient self   RELATIONSHIP SELF SELF SELF       Depression Screening:  :     3 most recent PHQ Screens 2019   Little interest or pleasure in doing things Not at all   Feeling down, depressed, irritable, or hopeless Not at all   Total Score PHQ 2 0       Fall Risk Assessment:  :     Fall Risk Assessment, last 12 mths 2018   Able to walk? Yes   Fall in past 12 months? No       Abuse Screening:  :     Abuse Screening Questionnaire 3/11/2019 2018 2017 2015   Do you ever feel afraid of your partner? N N N N   Are you in a relationship with someone who physically or mentally threatens you? N N N N   Is it safe for you to go home?  Timothy Siddiqui       Coordination of Care Questionnaire:  :     1) Have you been to an emergency room, urgent care clinic since your last visit? no   Hospitalized since your last visit? no             2) Have you seen or consulted any other health care providers outside of [address] [place] [address] since your last visit? no  (Include any pap smears or colon screenings in this section.)    3) Do you have an Advance Directive on file? no  Are you interested in receiving information about Advance Directives? no    Reviewed record in preparation for visit and have obtained necessary documentation. Medication reconciliation up to date and corrected with patient at this time.

## 2019-12-30 DIAGNOSIS — I10 ESSENTIAL HYPERTENSION, BENIGN: ICD-10-CM

## 2019-12-30 RX ORDER — LOSARTAN POTASSIUM AND HYDROCHLOROTHIAZIDE 25; 100 MG/1; MG/1
TABLET ORAL
Qty: 90 TAB | Refills: 0 | Status: SHIPPED | OUTPATIENT
Start: 2019-12-30 | End: 2020-03-31

## 2020-01-03 DIAGNOSIS — E03.9 ACQUIRED HYPOTHYROIDISM: ICD-10-CM

## 2020-01-03 DIAGNOSIS — B00.9 HERPES SIMPLEX INFECTION OF SKIN: ICD-10-CM

## 2020-01-03 RX ORDER — LEVOTHYROXINE SODIUM 150 MCG
TABLET ORAL
Qty: 90 TAB | Refills: 0 | Status: SHIPPED | OUTPATIENT
Start: 2020-01-03 | End: 2020-03-31

## 2020-01-03 RX ORDER — VALACYCLOVIR HYDROCHLORIDE 500 MG/1
TABLET, FILM COATED ORAL
Qty: 90 TAB | Refills: 0 | Status: SHIPPED | OUTPATIENT
Start: 2020-01-03 | End: 2020-03-26

## 2020-01-13 ENCOUNTER — OFFICE VISIT (OUTPATIENT)
Dept: FAMILY MEDICINE CLINIC | Age: 50
End: 2020-01-13

## 2020-01-13 VITALS
SYSTOLIC BLOOD PRESSURE: 118 MMHG | OXYGEN SATURATION: 98 % | HEIGHT: 64 IN | DIASTOLIC BLOOD PRESSURE: 76 MMHG | TEMPERATURE: 98.6 F | HEART RATE: 71 BPM | BODY MASS INDEX: 36.23 KG/M2 | RESPIRATION RATE: 20 BRPM | WEIGHT: 212.2 LBS

## 2020-01-13 DIAGNOSIS — E55.9 VITAMIN D DEFICIENCY: ICD-10-CM

## 2020-01-13 DIAGNOSIS — M25.512 LEFT SHOULDER PAIN, UNSPECIFIED CHRONICITY: ICD-10-CM

## 2020-01-13 DIAGNOSIS — M25.9 KNEE PROBLEM: ICD-10-CM

## 2020-01-13 DIAGNOSIS — M79.7 FIBROMYALGIA: Primary | ICD-10-CM

## 2020-01-13 DIAGNOSIS — M25.562 CHRONIC PAIN OF LEFT KNEE: ICD-10-CM

## 2020-01-13 DIAGNOSIS — J45.20 MILD INTERMITTENT ASTHMA WITHOUT COMPLICATION: ICD-10-CM

## 2020-01-13 DIAGNOSIS — L71.9 ACNE ROSACEA: ICD-10-CM

## 2020-01-13 DIAGNOSIS — M25.552 PAIN OF LEFT HIP JOINT: ICD-10-CM

## 2020-01-13 DIAGNOSIS — G89.29 CHRONIC PAIN OF LEFT KNEE: ICD-10-CM

## 2020-01-13 RX ORDER — DOXYCYCLINE 100 MG/1
100 CAPSULE ORAL 2 TIMES DAILY
Qty: 60 CAP | Refills: 0 | Status: SHIPPED | OUTPATIENT
Start: 2020-01-13 | End: 2020-01-15 | Stop reason: ALTCHOICE

## 2020-01-13 RX ORDER — ALBUTEROL SULFATE 90 UG/1
2 AEROSOL, METERED RESPIRATORY (INHALATION)
Qty: 3 INHALER | Refills: 1 | Status: SHIPPED | OUTPATIENT
Start: 2020-01-13 | End: 2022-02-25 | Stop reason: SDUPTHER

## 2020-01-13 RX ORDER — ERGOCALCIFEROL 1.25 MG/1
50000 CAPSULE ORAL
Qty: 15 CAP | Refills: 3 | Status: SHIPPED | OUTPATIENT
Start: 2020-01-13 | End: 2021-01-17

## 2020-01-13 RX ORDER — TRAMADOL HYDROCHLORIDE 50 MG/1
50 TABLET ORAL
Qty: 90 TAB | Refills: 5 | Status: SHIPPED | OUTPATIENT
Start: 2020-01-13 | End: 2020-07-20 | Stop reason: SDUPTHER

## 2020-01-13 NOTE — PROGRESS NOTES
Identified pt with two pt identifiers(name and ). Chief Complaint   Patient presents with    Hip Pain     left hip    Shoulder Pain     left shoulder    Medication Refill        Health Maintenance Due   Topic    Pneumococcal 0-64 years (1 of 1 - PPSV23)    Shingrix Vaccine Age 50> (1 of 2)    FOBT Q 1 YEAR AGE 50-75     PAP AKA CERVICAL CYTOLOGY        Wt Readings from Last 3 Encounters:   20 212 lb 3.2 oz (96.3 kg)   10/18/19 211 lb (95.7 kg)   07/10/19 216 lb 3.2 oz (98.1 kg)     Temp Readings from Last 3 Encounters:   20 98.6 °F (37 °C) (Oral)   10/18/19 98 °F (36.7 °C) (Oral)   07/10/19 98.4 °F (36.9 °C) (Oral)     BP Readings from Last 3 Encounters:   20 118/76   10/18/19 112/80   07/10/19 106/84     Pulse Readings from Last 3 Encounters:   20 71   10/18/19 80   07/10/19 70         Learning Assessment:  :     Learning Assessment 2014   PRIMARY LEARNER Patient Patient Patient   HIGHEST LEVEL OF EDUCATION - PRIMARY LEARNER  - - SOME COLLEGE   BARRIERS PRIMARY LEARNER - - NONE   CO-LEARNER CAREGIVER - - No   PRIMARY LANGUAGE ENGLISH ENGLISH ENGLISH   LEARNER PREFERENCE PRIMARY DEMONSTRATION DEMONSTRATION DEMONSTRATION     - - OTHER (COMMENT)   ANSWERED BY patient patient self   RELATIONSHIP SELF SELF SELF       Depression Screening:  :     3 most recent PHQ Screens 2020   Little interest or pleasure in doing things Not at all   Feeling down, depressed, irritable, or hopeless Not at all   Total Score PHQ 2 0       Fall Risk Assessment:  :     Fall Risk Assessment, last 12 mths 2018   Able to walk? Yes   Fall in past 12 months? No       Abuse Screening:  :     Abuse Screening Questionnaire 2020 3/11/2019 2018 2017 2015   Do you ever feel afraid of your partner? N N N N N   Are you in a relationship with someone who physically or mentally threatens you? N N N N N   Is it safe for you to go home?  Harry Bender       Coordination of Care Questionnaire:  :     1) Have you been to an emergency room, urgent care clinic since your last visit? no   Hospitalized since your last visit? no             2) Have you seen or consulted any other health care providers outside of [address] [place] [address] since your last visit? no  (Include any pap smears or colon screenings in this section.)    3) Do you have an Advance Directive on file? no  Are you interested in receiving information about Advance Directives? no    Reviewed record in preparation for visit and have obtained necessary documentation. Medication reconciliation up to date and corrected with patient at this time.

## 2020-01-13 NOTE — PROGRESS NOTES
Subjective:     Sravani Garrido is a 44 y.o. female who presents for follow up of hypertension, hyperlipidemia, obesity and fibromyalgia. Diet and Lifestyle: generally follows a low fat low cholesterol diet, generally follows a low sodium diet, exercises sporadically, nonsmoker  Home BP Monitoring: is not measured at home    Cardiovascular ROS: taking medications as instructed, no medication side effects noted, no TIA's, no chest pain on exertion, no dyspnea on exertion, no swelling of ankles. New concerns: C/O persistent R hip and L shoulder pain. Request referral to [facility]. Patient Active Problem List    Diagnosis Date Noted    Severe obesity (BMI 35.0-39.9) 07/05/2018    Acquired hypothyroidism 01/12/2018    Hypercholesterolemia 01/25/2017    Essential hypertension, benign 01/25/2013    Fibromyalgia 11/20/2012    Cervical spondylosis 07/20/2012    Acne rosacea 02/21/2012    Esophageal reflux 02/21/2012    Mild intermittent asthma without complication 16/21/0246     Current Outpatient Medications   Medication Sig Dispense Refill    ergocalciferol (ERGOCALCIFEROL) 50,000 unit capsule Take 1 Cap by mouth every seven (7) days. Indications: low vitamin D levels 15 Cap 3    albuterol (VENTOLIN HFA) 90 mcg/actuation inhaler Take 2 Puffs by inhalation every four (4) hours as needed for Wheezing. 3 Inhaler 1    traMADol (ULTRAM) 50 mg tablet Take 1 Tab by mouth every eight (8) hours as needed for Pain for up to 180 days. Indications: pain 90 Tab 5    doxycycline (MONODOX) 100 mg capsule Take 1 Cap by mouth two (2) times a day.  Indications: a skin condition on the cheeks and nose with a reddish rash and acne called acne rosacea 60 Cap 0    methocarbamol (ROBAXIN) 750 mg tablet TAKE 1 TABLET BY MOUTH EVERY DAY AT NIGHT 30 Tab 0    SYNTHROID 150 mcg tablet TAKE 1 TABLET BY MOUTH EVERY DAY BEFORE BREAKFAST 90 Tab 0    valACYclovir (VALTREX) 500 mg tablet TAKE 1 TABLET BY MOUTH EVERY DAY 90 Tab 0    losartan-hydroCHLOROthiazide (HYZAAR) 100-25 mg per tablet TAKE 1 TABLET BY MOUTH EVERY DAY 90 Tab 0    TURMERIC PO Take  by mouth.  FLAXSEED PO Take  by mouth.  CANNABIDIOL, CBD, EXTRACT PO Take  by mouth.  naproxen sodium (ALEVE) 220 mg tablet Take 220 mg by mouth two (2) times daily (with meals).  montelukast (SINGULAIR) 10 mg tablet TAKE 1 TABLET DAILY 90 Tab 0    multivitamin (ONE A DAY) tablet Take 1 Tab by mouth daily.  omeprazole (PRILOSEC) 20 mg capsule Take 20 mg by mouth daily.  Estradiol (DIVIGEL) 1 mg/gram (0.1 %) glpk 1 Packet by TransDERmal route daily. 90 Packet 0    EPINEPHrine (EPIPEN) 0.3 mg/0.3 mL injection 0.3 mL by IntraMUSCular route once as needed for up to 2 doses. 2 Syringe 3    acetaminophen (TYLENOL EXTRA STRENGTH) 500 mg tablet Take 1,000 mg by mouth every six (6) hours as needed for Pain.        Allergies   Allergen Reactions    Monsel's [Ferric Subsulfate] Swelling     Extreme burning, skin sloughing    Other Plant, Animal, Environmental Unknown (comments)     Vinegar    Premarin [Conjugated Estrogens] Itching     Past Medical History:   Diagnosis Date    Abnormal Pap smear 1/2/2012    [facility] -evaluation negative    Arthritis     Asthma     Atypical squamous cells of undetermined significance (ASCUS) on Papanicolaou smear of cervix 10/08/15    HPV Negative    Autoimmune disease ([facility] [address]     sjogrens    Bartholin's gland cyst     right marsupialization    Diverticulitis     in the past- has seen Dr. Marek Devlin for evaluation    Dyspareunia     Endometriosis     Fibromyalgia     GERD (gastroesophageal reflux disease)     Hiatal hernia     Hypercholesterolemia 1/25/2017    Hypertension     Hypothyroid     IBS (irritable bowel syndrome)     Lichen sclerosus     Pelvic pain     Rosacea     Sjogren's syndrome ([facility] [address]     sees Dr. 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 Objective:     Visit Vitals  /76 (BP 1 Location: Left arm, BP Patient Position: Sitting) Comment: manual   Pulse 71   Temp 98.6 °F (37 °C) (Oral)   Resp 20   Ht 5' 4\" (1.626 m)   Wt 212 lb 3.2 oz (96.3 kg)   LMP 11/05/2013   SpO2 98%   BMI 36.42 kg/m²     Appearance: alert, well appearing, and in no distress and overweight. General exam: CVS exam BP noted to be well controlled today in office, S1, S2 normal, no gallop, no murmur, chest clear, no JVD, no HSM, no edema. Lab review: orders written for new lab studies as appropriate; see orders. Assessment/Plan:     . Robbie [place] ICD-10-CM ICD-9-CM    1. Fibromyalgia M79.7 729.1 traMADol (ULTRAM) 50 mg tablet   2. Vitamin D deficiency E55.9 268.9 ergocalciferol (ERGOCALCIFEROL) 50,000 unit capsule   3. Mild intermittent asthma without complication N81.54 722.67 albuterol (VENTOLIN HFA) 90 mcg/actuation inhaler   4. Chronic pain of left knee M25.562 719.46 traMADol (ULTRAM) 50 mg tablet    G89.29 338.29    5. Acne rosacea L71.9 695.3 doxycycline (MONODOX) 100 mg capsule   6. Pain of left hip joint M25.552 719.45 REFERRAL TO ORTHOPEDIC SURGERY   7. Left shoulder pain, unspecified chronicity M25.512 719.41 REFERRAL TO ORTHOPEDIC SURGERY   8. Knee problem M25.9 719.96 REFERRAL TO ORTHOPEDIC SURGERY     Med refills ordered.   Refer to [facility]

## 2020-01-15 ENCOUNTER — TELEPHONE (OUTPATIENT)
Dept: FAMILY MEDICINE CLINIC | Age: 50
End: 2020-01-15

## 2020-01-15 DIAGNOSIS — L71.9 ACNE ROSACEA: Primary | ICD-10-CM

## 2020-01-15 RX ORDER — DOXYCYCLINE 100 MG/1
100 CAPSULE ORAL 2 TIMES DAILY
Qty: 60 CAP | Refills: 5 | Status: SHIPPED | OUTPATIENT
Start: 2020-01-15 | End: 2020-07-14

## 2020-02-11 ENCOUNTER — TELEPHONE (OUTPATIENT)
Dept: FAMILY MEDICINE CLINIC | Age: 50
End: 2020-02-11

## 2020-02-11 DIAGNOSIS — M19.90 ARTHRITIS: Primary | ICD-10-CM

## 2020-02-11 NOTE — TELEPHONE ENCOUNTER
----- Message from Maryan Serrano LPN sent at 1/68/3587  4:40 PM EST -----  Regarding: FW: Referral Request  Contact: [phone]    ----- Message -----  From: Ad Chamorro  Sent: 2/11/2020   4:39 PM EST  To: [facility] Nurse Pool  Subject: Referral Request                                 Hi Dr. Seema Torres, can you give me a referral to see Dr. Juan A Castro. I have an appt. Scheduled with him this Friday, 2/14/20.     Thank you,   Ad Chamorro

## 2020-02-14 ENCOUNTER — OFFICE VISIT (OUTPATIENT)
Dept: RHEUMATOLOGY | Age: 50
End: 2020-02-14

## 2020-02-14 VITALS
TEMPERATURE: 97.5 F | BODY MASS INDEX: 36.36 KG/M2 | HEART RATE: 83 BPM | RESPIRATION RATE: 16 BRPM | SYSTOLIC BLOOD PRESSURE: 124 MMHG | WEIGHT: 211.8 LBS | OXYGEN SATURATION: 95 % | DIASTOLIC BLOOD PRESSURE: 75 MMHG

## 2020-02-14 DIAGNOSIS — M79.7 FIBROMYALGIA: ICD-10-CM

## 2020-02-14 DIAGNOSIS — M70.71 ISCHIAL BURSITIS OF RIGHT SIDE: Primary | ICD-10-CM

## 2020-02-14 RX ORDER — PREDNISONE 5 MG/1
TABLET ORAL
Qty: 30 TAB | Refills: 1 | Status: SHIPPED | OUTPATIENT
Start: 2020-02-14 | End: 2021-02-26 | Stop reason: ALTCHOICE

## 2020-02-14 NOTE — PROGRESS NOTES
RHEUMATOLOGY PROBLEM LIST AND CHIEF COMPLAINT   1. Sjogren's syndrome - dry eyes, arthralgia, SSA positive, elevated CRP   2. Myofascial pain syndrome    INTERVAL HISTORY  This is a 44 y.o.  female. Today, the patient complains of pain in the joints. Location: hip  Severity:  8 on a scale of 0-10  Timing: intermittent   Duration:  a few months  Modifying factors: none  Context/Associated signs and symptoms: The patient has not been seen since 5/2017. In the past month the patient has developed worsening pain in the bones of the buttocks, worst while sitting or with activity. With range of motion she develops shooting pain down the posterior thigh. She is currently using high doses of NSAIDs, tramadol, and CBD oil with minimal relief. Ortho attributed the pain to bulging disc and possible impingement. The patient continues to complain of shoulder pain under the right clavicle with range of motion. She reports she cannot rotate the shoulder forward but can rotate it backwards. She has been seen by ortho who recommended PT.  She continues to have fatigue, dry eyes, and dry mouth.      RHEUMATOLOGY REVIEW OF SYSTEMS   Positives as per history  Negatives as follows:  CONSTITUTlONAL:  Denies unexplained persistent fevers, weight change, chronic fatigue  HEAD/EYES:   Denies eye redness, blurry vision or sudden loss of vision, dry eyes, HA, temporal artery pain  ENT:    Denies oral/nasal ulcers, recurrent sinus infections, dry mouth  RESPIRATORY:  No pleuritic pain, history of pleural effusions, hemoptysis, exertional dyspnea  CARDIOVASCULAR:  Denies chest pain, history of pericardial effusions  GASTRO:   Denies heartburn, esophageal dysmotility, abdominal pain, nausea, vomiting, diarrhea, blood in the stool  HEMATOLOGIC:  No easy bruising, purpura, swollen lymph nodes  SKIN:    Denies alopecia, ulcers, nodules, sun sensitivity, unexplained persistent rash   VASCULAR:   Denies edema, cyanosis, raynaud phenomenon  NEUROLOGIC:  Denies specific muscle weakness   PSYCHIATRIC:  No sleep disturbance / snoring, depression, anxiety  MSK:    No morning stiffness >1 hour, SI joint pain, persistent joint swelling, persistent joint pain    PAST MEDICAL HISTORY  Reviewed with patient, significant changes in medical history - no     PHYSICAL EXAM  Blood pressure 124/75, pulse 83, temperature 97.5 °F (36.4 °C), temperature source Oral, resp. rate 16, weight 211 lb 12.8 oz (96.1 kg), last menstrual period 11/05/2013, SpO2 95 %. GENERAL APPEARANCE: Well-nourished, no acute distress  EYES: No scleral erythema, conjunctival injection  ENT: No oral ulcer, parotid enlargement  NECK: No adenopathy, thyroid enlargement  CARDIOVASCULAR: Heart rhythm is regular. No murmur, rub, gallop  CHEST: Normal vesicular breath sounds. No wheezes, rales, pleural friction rubs  ABDOMINAL: The abdomen is soft and nontender. Bowel sounds are normal  EXTREMITIES: There is no evidence of clubbing, cyanosis, edema  SKIN: No rash, palpable purpura, digital ulcer, abnormal thickening, normal nailfold capillaries   NEUROLOGICAL: Normal gait and station, full strength in upper and lower extremities,  normal sensation to light touch  MUSCULOSKELETAL:   Upper extremities - full range of motion, no tenderness, no swelling, no synovial thickening and no deformity of joints  Lower extremities - full range of motion, no tenderness, no swelling, no synovial thickening and no deformity of joints Positive straight raise     LABS, RADIOLOGY AND PROCEDURES   Previous labs reviewed -Yes     ASSESSMENT  1. Sjogren's - The patient should continue with symptomatic treatment for dry eyes and dry mouth. 2. Back pain and myofascial pain syndrome - The patient should continue doing stretches and exercise at home. She did not complain of this today.    3. Shoulder pain and arm/hand numbness -  The patient's EMG and x-ray were overall normal. She should continue ROM exercises for this issue. 4. Ischial bursitis (Burnette's Bottom)- Ischial bursitis, sometimes called Burnette's bottom, is a condition in which the bursa near the ischial tuberosity becomes inflamed which can result from sitting for long periods on a hard surface, trauma or injury. Ischial bursitis causes pain in the buttock with radiation of pain down the back of the leg with walking, sitting, or flexing of the hip. We discussed that this condition is often confused with sciatic. Pharmacologic treatment includes NSAIDs and corticosteroid injections. Treatment with short courses of prednisone may also be effective. Exercises were given to the patient which may provide relief. Some patent's find relief from using a \"donut\" pillow since sitting can aggravate the condition. I will give the patient a twelve day course of steroids since she has not responded to NSAIDs. She should follow up with pain management for a corticosteroid injection if her symptoms do not improve. I will also order an EMG today to further evaluate. PLAN  1. Referral to pain management  2. EMG  3. Prednisone 20 mg daily q3 days; taper by 5 mg q3 days  4. Symptomatic treatment of dry eyes and dry mouth      Rafy Aden MD  Adult and Pediatric Rheumatology     [facility] of [address], Phone [phone], Fax [phone]     Visiting  of Pediatrics    Department of Pediatrics, [facility] of [address] [place] [address], Phone [phone], Fax [phone]    There are no Patient Instructions on file for this visit. cc:  Rachel Olivia MD    Written by camacho Sparks, as dictated by Janna Curtis.  Gigi Aden M.D.

## 2020-02-14 NOTE — PROGRESS NOTES
Chief Complaint   Patient presents with    Joint Pain     1. Have you been to the ER, urgent care clinic since your last visit? Hospitalized since your last visit? NO    2. Have you seen or consulted any other health care providers outside of the [address] [place] [address] since your last visit? Include any pap smears or colon screening. No

## 2020-02-23 DIAGNOSIS — M79.7 FIBROMYALGIA: ICD-10-CM

## 2020-02-24 RX ORDER — METHOCARBAMOL 750 MG/1
TABLET, FILM COATED ORAL
Qty: 30 TAB | Refills: 0 | Status: SHIPPED | OUTPATIENT
Start: 2020-02-24 | End: 2020-03-23

## 2020-03-12 ENCOUNTER — OFFICE VISIT (OUTPATIENT)
Dept: OBGYN CLINIC | Age: 50
End: 2020-03-12

## 2020-03-12 VITALS
HEIGHT: 64 IN | SYSTOLIC BLOOD PRESSURE: 142 MMHG | DIASTOLIC BLOOD PRESSURE: 80 MMHG | WEIGHT: 212 LBS | BODY MASS INDEX: 36.19 KG/M2

## 2020-03-12 DIAGNOSIS — J45.20 MILD INTERMITTENT ASTHMA WITHOUT COMPLICATION: ICD-10-CM

## 2020-03-12 DIAGNOSIS — Z01.419 ENCOUNTER FOR ROUTINE GYNECOLOGICAL EXAMINATION WITH PAPANICOLAOU SMEAR OF CERVIX: Primary | ICD-10-CM

## 2020-03-12 RX ORDER — MONTELUKAST SODIUM 10 MG/1
10 TABLET ORAL DAILY
Qty: 90 TAB | Refills: 3 | Status: SHIPPED | OUTPATIENT
Start: 2020-03-12 | End: 2021-02-12

## 2020-03-12 NOTE — PROGRESS NOTES
Annual exam ages 40-58 post hysterectomy    Wilda Xie is a ,  44 y.o. female [facility] Patient's last menstrual period was 2013. .    She presents for her annual checkup. She is having no significant problems. Bartholin cyst bothering her til recently, did not drain but much better with Doxy she had. With regard to the Gardasil vaccine, she is older than the age for which it is FDA approved. Hormonal status:  She reports no perimenstrual type symptoms. She is not having vasomotor symptoms. The patient is not using any ERT. Some hot flashes but not need med for that. Sexual history:    She  reports being sexually active and has had partner(s) who are Male. She reports using the following method of birth control/protection: Surgical.    Medical conditions:    Since her last annual GYN exam about one year ago, she has not the following changes in her health history: none. Surgical history confirmed with patient. has a past surgical history that includes hx cholecystectomy; hx orthopaedic (Left); hx laparoscopic supracervical hysterectomy (2013); hx other surgical; hx colposcopy (12); hx other surgical (); hx other surgical (); hx dilation and curettage (2012); and hx oophorectomy. Pap and Mammogram History:    Her most recent Pap smear was normal, obtained 3 year(s) ago. The patient has not had a recent mammogram.    Breast Cancer History/Substance Abuse: negative    Osteoporosis History:    Family history does not include a first or second degree relative with osteopenia or osteoporosis.     A bone density scan has not been obtained    Past Medical History:   Diagnosis Date    Abnormal Pap smear 2012    [facility]MI -evaluation negative    Arthritis     Asthma     Atypical squamous cells of undetermined significance (ASCUS) on Papanicolaou smear of cervix 10/08/15    HPV Negative    Autoimmune disease ([facility] [address]     sjogrens    Bartholin's gland cyst     right marsupialization    Diverticulitis     in the past- has seen Dr. Diana Armando for evaluation    Dyspareunia     Endometriosis     Fibromyalgia     GERD (gastroesophageal reflux disease)     Hiatal hernia     Hypercholesterolemia 1/25/2017    Hypertension     Hypothyroid     IBS (irritable bowel syndrome)     Lichen sclerosus     Pelvic pain     Rosacea     Sjogren's syndrome ([facility] [address]     sees Dr. 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    vulvar biopsy done-suggestive of LS     Past Surgical History:   Procedure Laterality Date    HX CHOLECYSTECTOMY      HX COLPOSCOPY  2/14/12    No dysplasia    HX DILATION AND CURETTAGE  2/14/2012    benign tissue- problems with anesthesia afterwards    HX LAPAROSCOPIC SUPRACERVICAL HYSTERECTOMY  11/25/2013    w/ RSO    HX OOPHORECTOMY      HX ORTHOPAEDIC Left     ACLX2    HX OTHER SURGICAL      Bartholins Marsupialization    HX OTHER SURGICAL  02/07    LSO--Laparotomy w/ [place] also    HX OTHER SURGICAL  11/08    vulvar biopsy       Current Outpatient Medications   Medication Sig Dispense Refill    methocarbamol (ROBAXIN) 750 mg tablet TAKE 1 TABLET BY MOUTH EVERY DAY EVERY NIGHT 30 Tab 0    ergocalciferol (ERGOCALCIFEROL) 50,000 unit capsule Take 1 Cap by mouth every seven (7) days. Indications: low vitamin D levels 15 Cap 3    albuterol (VENTOLIN HFA) 90 mcg/actuation inhaler Take 2 Puffs by inhalation every four (4) hours as needed for Wheezing. 3 Inhaler 1    traMADol (ULTRAM) 50 mg tablet Take 1 Tab by mouth every eight (8) hours as needed for Pain for up to 180 days.  Indications: pain 90 Tab 5    SYNTHROID 150 mcg tablet TAKE 1 TABLET BY MOUTH EVERY DAY BEFORE BREAKFAST 90 Tab 0    valACYclovir (VALTREX) 500 mg tablet TAKE 1 TABLET BY MOUTH EVERY DAY 90 Tab 0    losartan-hydroCHLOROthiazide (HYZAAR) 100-25 mg per tablet TAKE 1 TABLET BY MOUTH EVERY DAY 90 Tab 0  TURMERIC PO Take  by mouth.  FLAXSEED PO Take  by mouth.  CANNABIDIOL, CBD, EXTRACT PO Take  by mouth.  naproxen sodium (ALEVE) 220 mg tablet Take 220 mg by mouth two (2) times daily (with meals).  montelukast (SINGULAIR) 10 mg tablet TAKE 1 TABLET DAILY 90 Tab 0    acetaminophen (TYLENOL EXTRA STRENGTH) 500 mg tablet Take 1,000 mg by mouth every six (6) hours as needed for Pain.  multivitamin (ONE A DAY) tablet Take 1 Tab by mouth daily.  omeprazole (PRILOSEC) 20 mg capsule Take 20 mg by mouth daily.  predniSONE (DELTASONE) 5 mg tablet 20mg x 3 days, 15mg x 3 days, 10mg x 3 days, 5mg x 3 days 30 Tab 1    doxycycline (VIBRAMYCIN) 100 mg capsule Take 1 Cap by mouth two (2) times a day. Indications: a skin condition on the cheeks and nose with a reddish rash and acne called acne rosacea 60 Cap 5    Estradiol (DIVIGEL) 1 mg/gram (0.1 %) glpk 1 Packet by TransDERmal route daily. 90 Packet 0    EPINEPHrine (EPIPEN) 0.3 mg/0.3 mL injection 0.3 mL by IntraMUSCular route once as needed for up to 2 doses. 2 Syringe 3     Allergies: Monsel's [ferric subsulfate]; Other plant, animal, environmental; and Premarin [conjugated estrogens]     Tobacco History:  reports that she has never smoked. She has never used smokeless tobacco.  Alcohol Abuse:  reports current alcohol use. Drug Abuse:  reports no history of drug use.     Family Medical/Cancer History:   Family History   Problem Relation Age of Onset    Heart Disease Mother         pacemaker, defibrillator    Osteoporosis Mother     Diabetes Brother     Hypertension Father         Review of Systems - History obtained from the patient  Constitutional: negative for weight loss, fever, night sweats  HEENT: negative for hearing loss, earache, congestion, snoring, sorethroat  CV: negative for chest pain, palpitations, edema  Resp: negative for cough, shortness of breath, wheezing  GI: negative for change in bowel habits, abdominal pain, black or bloody stools  : negative for frequency, dysuria, hematuria, vaginal discharge  MSK: negative for back pain, joint pain, muscle pain  Breast: negative for breast lumps, nipple discharge, galactorrhea  Skin :negative for itching, rash, hives  Neuro: negative for dizziness, headache, confusion, weakness  Psych: negative for anxiety, depression, change in mood  Heme/lymph: negative for bleeding, bruising, pallor    Physical Exam    Visit Vitals  /80   Ht 5' 4\" (1.626 m)   Wt 212 lb (96.2 kg)   LMP 11/05/2013   BMI 36.39 kg/m²     Constitutional  · Appearance: well-nourished, well developed, alert, in no acute distress    HENT  · Head and Face: appears normal    Neck  · Inspection/Palpation: normal appearance, no masses or tenderness  · Lymph Nodes: no lymphadenopathy present  · Thyroid: gland size normal, nontender, no nodules or masses present on palpation    Chest  · Respiratory Effort: breathing unlabored  · Auscultation: normal breath sounds    Cardiovascular  · Heart:  · Auscultation: regular rate and rhythm without murmur    Breasts  · Inspection of Breasts: breasts symmetrical, no skin changes, no discharge present, nipple appearance normal, no skin retraction present  · Palpation of Breasts and Axillae: no masses present on palpation, no breast tenderness  · Axillary Lymph Nodes: no lymphadenopathy present    Gastrointestinal  · Abdominal Examination: abdomen non-tender to palpation, normal bowel sounds, no masses present  · Liver and spleen: no hepatomegaly present, spleen not palpable  · Hernias: no hernias identified    Genitourinary  · External Genitalia: normal appearance for age, no discharge present, no tenderness present, no inflammatory lesions present, no masses present, no atrophy present  · Vagina: normal vaginal vault without central or paravaginal defects, no discharge present, no inflammatory lesions present, no masses present  · Bladder: non-tender to palpation  · Urethra: appears normal  · Cervix: normal  · Uterus: absent  · Adnexa: no adnexal tenderness present, no adnexal masses present  · Perineum: perineum within normal limits, no evidence of trauma, no rashes or skin lesions present  · Anus: anus within normal limits, no hemorrhoids present  · Inguinal Lymph Nodes: no lymphadenopathy present    Skin  · General Inspection: no rash, no lesions identified    Neurologic/Psychiatric  · Mental Status:  · Orientation: grossly oriented to person, place and time  · Mood and Affect: mood normal, affect appropriate    Assessment:  Routine gynecologic examination  Her current medical status is satisfactory with no evidence of significant gynecologic issues. No vulvar abnormality seen.     Plan:  Counseled re: diet, exercise, healthy lifestyle  Return for yearly wellness visits  Rec annual mammogram

## 2020-03-12 NOTE — PATIENT INSTRUCTIONS
Well Visit, Women 48 to 72: Care Instructions  Your Care Instructions    Physical exams can help you stay healthy. Your doctor has checked your overall health and may have suggested ways to take good care of yourself. He or she also may have recommended tests. At home, you can help prevent illness with healthy eating, regular exercise, and other steps. Follow-up care is a key part of your treatment and safety. Be sure to make and go to all appointments, and call your doctor if you are having problems. It's also a good idea to know your test results and keep a list of the medicines you take. How can you care for yourself at home? · Reach and stay at a healthy weight. This will lower your risk for many problems, such as obesity, diabetes, heart disease, and high blood pressure. · Get at least 30 minutes of exercise on most days of the week. Walking is a good choice. You also may want to do other activities, such as running, swimming, cycling, or playing tennis or team sports. · Do not smoke. Smoking can make health problems worse. If you need help quitting, talk to your doctor about stop-smoking programs and medicines. These can increase your chances of quitting for good. · Protect your skin from too much sun. When you're outdoors from 10 a.m. to 4 p.m., stay in the shade or cover up with clothing and a hat with a wide brim. Wear sunglasses that block UV rays. Even when it's cloudy, put broad-spectrum sunscreen (SPF 30 or higher) on any exposed skin. · See a dentist one or two times a year for checkups and to have your teeth cleaned. · Wear a seat belt in the car. Follow your doctor's advice about when to have certain tests. These tests can spot problems early. · Cholesterol. Your doctor will tell you how often to have this done based on your age, family history, or other things that can increase your risk for heart attack and stroke. · Blood pressure.  Have your blood pressure checked during a routine doctor visit. Your doctor will tell you how often to check your blood pressure based on your age, your blood pressure results, and other factors. · Mammogram. Ask your doctor how often you should have a mammogram, which is an X-ray of your breasts. A mammogram can spot breast cancer before it can be felt and when it is easiest to treat. · Pap test and pelvic exam. Ask your doctor how often you should have a Pap test. You may not need to have a Pap test as often as you used to. · Vision. Have your eyes checked every year or two or as often as your doctor suggests. Some experts recommend that you have yearly exams for glaucoma and other age-related eye problems starting at age 48. · Hearing. Tell your doctor if you notice any change in your hearing. You can have tests to find out how well you hear. · Diabetes. Ask your doctor whether you should have tests for diabetes. · Colorectal cancer. Your risk for colorectal cancer gets higher as you get older. Some experts say that adults should start regular screening at age 48 and stop at age 76. Others say to start before age 48 or continue after age 76. Talk with your doctor about your risk and when to start and stop screening. · Thyroid disease. Talk to your doctor about whether to have your thyroid checked as part of a regular physical exam. Women have an increased chance of a thyroid problem. · Osteoporosis. You should begin tests for bone density at age 72. If you are younger than 72, ask your doctor whether you have factors that may increase your risk for this disease. You may want to have this test before age 72. · Heart attack and stroke risk. At least every 4 to 6 years, you should have your risk for heart attack and stroke assessed. Your doctor uses factors such as your age, blood pressure, cholesterol, and whether you smoke or have diabetes to show what your risk for a heart attack or stroke is over the next 10 years.   When should you call for help?  Watch closely for changes in your health, and be sure to contact your doctor if you have any problems or symptoms that concern you. Where can you learn more? Go to http://jesus-manasa.info/  Enter [id number] in the search box to learn more about \"Well Visit, Women 50 to 72: Care Instructions. \"  Current as of: August 21, 2019Content Version: 12.4  © 7271-9030 Healthwise, Incorporated. Care instructions adapted under license by [employer] (which disclaims liability or warranty for this information). If you have questions about a medical condition or this instruction, always ask your healthcare professional. [address] any warranty or liability for your use of this information.

## 2020-03-17 LAB
CYTOLOGIST CVX/VAG CYTO: NORMAL
CYTOLOGY CVX/VAG DOC CYTO: NORMAL
CYTOLOGY CVX/VAG DOC THIN PREP: NORMAL
CYTOLOGY HISTORY:: NORMAL
DX ICD CODE: NORMAL
HPV I/H RISK 1 DNA CVX QL PROBE+SIG AMP: NEGATIVE
Lab: NORMAL
OTHER STN SPEC: NORMAL
STAT OF ADQ CVX/VAG CYTO-IMP: NORMAL

## 2020-03-23 DIAGNOSIS — M79.7 FIBROMYALGIA: ICD-10-CM

## 2020-03-23 RX ORDER — METHOCARBAMOL 750 MG/1
TABLET, FILM COATED ORAL
Qty: 30 TAB | Refills: 0 | Status: SHIPPED | OUTPATIENT
Start: 2020-03-23 | End: 2020-04-26

## 2020-03-26 DIAGNOSIS — B00.9 HERPES SIMPLEX INFECTION OF SKIN: ICD-10-CM

## 2020-03-26 RX ORDER — VALACYCLOVIR HYDROCHLORIDE 500 MG/1
TABLET, FILM COATED ORAL
Qty: 30 TAB | Refills: 2 | Status: SHIPPED | OUTPATIENT
Start: 2020-03-26 | End: 2020-06-20

## 2020-03-31 DIAGNOSIS — I10 ESSENTIAL HYPERTENSION, BENIGN: ICD-10-CM

## 2020-03-31 DIAGNOSIS — E03.9 ACQUIRED HYPOTHYROIDISM: ICD-10-CM

## 2020-03-31 RX ORDER — LOSARTAN POTASSIUM AND HYDROCHLOROTHIAZIDE 25; 100 MG/1; MG/1
TABLET ORAL
Qty: 30 TAB | Refills: 5 | Status: SHIPPED | OUTPATIENT
Start: 2020-03-31 | End: 2020-09-10

## 2020-03-31 RX ORDER — LEVOTHYROXINE SODIUM 150 MCG
TABLET ORAL
Qty: 30 TAB | Refills: 5 | Status: SHIPPED | OUTPATIENT
Start: 2020-03-31 | End: 2020-09-10

## 2020-04-25 DIAGNOSIS — M79.7 FIBROMYALGIA: ICD-10-CM

## 2020-04-26 RX ORDER — METHOCARBAMOL 750 MG/1
TABLET, FILM COATED ORAL
Qty: 90 TAB | Refills: 1 | Status: SHIPPED | OUTPATIENT
Start: 2020-04-26 | End: 2020-11-16

## 2020-06-20 DIAGNOSIS — B00.9 HERPES SIMPLEX INFECTION OF SKIN: ICD-10-CM

## 2020-06-20 RX ORDER — VALACYCLOVIR HYDROCHLORIDE 500 MG/1
TABLET, FILM COATED ORAL
Qty: 30 TAB | Refills: 2 | Status: SHIPPED | OUTPATIENT
Start: 2020-06-20 | End: 2020-09-27

## 2020-07-20 ENCOUNTER — OFFICE VISIT (OUTPATIENT)
Dept: FAMILY MEDICINE CLINIC | Age: 50
End: 2020-07-20

## 2020-07-20 ENCOUNTER — HOSPITAL ENCOUNTER (OUTPATIENT)
Dept: LAB | Age: 50
Discharge: HOME | End: 2020-07-20

## 2020-07-20 VITALS
HEIGHT: 64 IN | HEART RATE: 89 BPM | TEMPERATURE: 98.2 F | BODY MASS INDEX: 34.72 KG/M2 | RESPIRATION RATE: 20 BRPM | OXYGEN SATURATION: 96 % | WEIGHT: 203.4 LBS | DIASTOLIC BLOOD PRESSURE: 84 MMHG | SYSTOLIC BLOOD PRESSURE: 120 MMHG

## 2020-07-20 DIAGNOSIS — T63.441S BEE STING-INDUCED ANAPHYLAXIS, ACCIDENTAL OR UNINTENTIONAL, SEQUELA: ICD-10-CM

## 2020-07-20 DIAGNOSIS — F34.1 DYSTHYMIA: ICD-10-CM

## 2020-07-20 DIAGNOSIS — E55.9 VITAMIN D DEFICIENCY: ICD-10-CM

## 2020-07-20 DIAGNOSIS — R73.9 HYPERGLYCEMIA: ICD-10-CM

## 2020-07-20 DIAGNOSIS — G89.29 CHRONIC PAIN OF LEFT KNEE: ICD-10-CM

## 2020-07-20 DIAGNOSIS — E03.9 ACQUIRED HYPOTHYROIDISM: ICD-10-CM

## 2020-07-20 DIAGNOSIS — E78.00 HYPERCHOLESTEROLEMIA: ICD-10-CM

## 2020-07-20 DIAGNOSIS — E53.8 VITAMIN B 12 DEFICIENCY: ICD-10-CM

## 2020-07-20 DIAGNOSIS — Z79.899 ENCOUNTER FOR LONG-TERM CURRENT USE OF MEDICATION: ICD-10-CM

## 2020-07-20 DIAGNOSIS — M25.562 CHRONIC PAIN OF LEFT KNEE: ICD-10-CM

## 2020-07-20 DIAGNOSIS — M79.7 FIBROMYALGIA: ICD-10-CM

## 2020-07-20 DIAGNOSIS — I10 ESSENTIAL HYPERTENSION, BENIGN: Primary | ICD-10-CM

## 2020-07-20 DIAGNOSIS — Z23 ENCOUNTER FOR IMMUNIZATION: ICD-10-CM

## 2020-07-20 DIAGNOSIS — R20.2 PARESTHESIA: ICD-10-CM

## 2020-07-20 LAB
25(OH)D3 SERPL-MCNC: 87.7 NG/ML (ref 30–100)
ALBUMIN SERPL-MCNC: 4.2 G/DL (ref 3.5–5)
ALBUMIN/GLOB SERPL: 1.3 {RATIO} (ref 1.1–2.2)
ALP SERPL-CCNC: 92 U/L (ref 45–117)
ALT SERPL-CCNC: 19 U/L (ref 12–78)
ANION GAP SERPL CALC-SCNC: 9 MMOL/L (ref 5–15)
AST SERPL-CCNC: 24 U/L (ref 15–37)
BASOPHILS # BLD: 0 K/UL (ref 0–0.1)
BASOPHILS NFR BLD: 1 % (ref 0–1)
BILIRUB SERPL-MCNC: 0.5 MG/DL (ref 0.2–1)
BUN SERPL-MCNC: 12 MG/DL (ref 6–20)
BUN/CREAT SERPL: 18 (ref 12–20)
CALCIUM SERPL-MCNC: 9.3 MG/DL (ref 8.5–10.1)
CHLORIDE SERPL-SCNC: 101 MMOL/L (ref 97–108)
CHOLEST SERPL-MCNC: 220 MG/DL
CO2 SERPL-SCNC: 29 MMOL/L (ref 21–32)
COMMENT, HOLDF: NORMAL
CREAT SERPL-MCNC: 0.68 MG/DL (ref 0.55–1.02)
DIFFERENTIAL METHOD BLD: NORMAL
EOSINOPHIL # BLD: 0.4 K/UL (ref 0–0.4)
EOSINOPHIL NFR BLD: 6 % (ref 0–7)
ERYTHROCYTE [DISTWIDTH] IN BLOOD BY AUTOMATED COUNT: 13.6 % (ref 11.5–14.5)
EST. AVERAGE GLUCOSE BLD GHB EST-MCNC: 111 MG/DL
GLOBULIN SER CALC-MCNC: 3.3 G/DL (ref 2–4)
GLUCOSE SERPL-MCNC: 83 MG/DL (ref 65–100)
HBA1C MFR BLD: 5.5 % (ref 4–5.6)
HCT VFR BLD AUTO: 41.7 % (ref 35–47)
HDLC SERPL-MCNC: 78 MG/DL
HDLC SERPL: 2.8 {RATIO} (ref 0–5)
HGB BLD-MCNC: 13 G/DL (ref 11.5–16)
IMM GRANULOCYTES # BLD AUTO: 0 K/UL (ref 0–0.04)
IMM GRANULOCYTES NFR BLD AUTO: 0 % (ref 0–0.5)
LDLC SERPL CALC-MCNC: 129.2 MG/DL (ref 0–100)
LIPID PROFILE,FLP: ABNORMAL
LYMPHOCYTES # BLD: 2.2 K/UL (ref 0.8–3.5)
LYMPHOCYTES NFR BLD: 34 % (ref 12–49)
MAGNESIUM SERPL-MCNC: 2.1 MG/DL (ref 1.6–2.4)
MCH RBC QN AUTO: 28.5 PG (ref 26–34)
MCHC RBC AUTO-ENTMCNC: 31.2 G/DL (ref 30–36.5)
MCV RBC AUTO: 91.4 FL (ref 80–99)
MONOCYTES # BLD: 0.5 K/UL (ref 0–1)
MONOCYTES NFR BLD: 7 % (ref 5–13)
NEUTS SEG # BLD: 3.4 K/UL (ref 1.8–8)
NEUTS SEG NFR BLD: 52 % (ref 32–75)
NRBC # BLD: 0 K/UL (ref 0–0.01)
NRBC BLD-RTO: 0 PER 100 WBC
PLATELET # BLD AUTO: 309 K/UL (ref 150–400)
PMV BLD AUTO: 10.2 FL (ref 8.9–12.9)
POTASSIUM SERPL-SCNC: 3.2 MMOL/L (ref 3.5–5.1)
PROT SERPL-MCNC: 7.5 G/DL (ref 6.4–8.2)
RBC # BLD AUTO: 4.56 M/UL (ref 3.8–5.2)
SAMPLES BEING HELD,HOLD: NORMAL
SODIUM SERPL-SCNC: 139 MMOL/L (ref 136–145)
T4 FREE SERPL-MCNC: 1.6 NG/DL (ref 0.8–1.5)
TRIGL SERPL-MCNC: 64 MG/DL (ref ?–150)
TSH SERPL DL<=0.05 MIU/L-ACNC: 0.92 UIU/ML (ref 0.36–3.74)
VIT B12 SERPL-MCNC: 493 PG/ML (ref 193–986)
VLDLC SERPL CALC-MCNC: 12.8 MG/DL
WBC # BLD AUTO: 6.5 K/UL (ref 3.6–11)

## 2020-07-20 RX ORDER — TRAMADOL HYDROCHLORIDE 50 MG/1
50 TABLET ORAL
Qty: 90 TAB | Refills: 5 | Status: SHIPPED | OUTPATIENT
Start: 2020-07-20 | End: 2021-02-26 | Stop reason: SDUPTHER

## 2020-07-20 RX ORDER — ZOSTER VACCINE RECOMBINANT, ADJUVANTED 50 MCG/0.5
0.5 KIT INTRAMUSCULAR ONCE
Qty: 0.5 ML | Refills: 1 | Status: SHIPPED | OUTPATIENT
Start: 2020-07-20 | End: 2020-07-20

## 2020-07-20 RX ORDER — PNEUMOCOCCAL VACCINE POLYVALENT 25; 25; 25; 25; 25; 25; 25; 25; 25; 25; 25; 25; 25; 25; 25; 25; 25; 25; 25; 25; 25; 25; 25 UG/.5ML; UG/.5ML; UG/.5ML; UG/.5ML; UG/.5ML; UG/.5ML; UG/.5ML; UG/.5ML; UG/.5ML; UG/.5ML; UG/.5ML; UG/.5ML; UG/.5ML; UG/.5ML; UG/.5ML; UG/.5ML; UG/.5ML; UG/.5ML; UG/.5ML; UG/.5ML; UG/.5ML; UG/.5ML; UG/.5ML
0.5 INJECTION, SOLUTION INTRAMUSCULAR; SUBCUTANEOUS ONCE
Qty: 0.5 ML | Refills: 0 | Status: SHIPPED | OUTPATIENT
Start: 2020-07-20 | End: 2020-07-20

## 2020-07-20 RX ORDER — EPINEPHRINE 0.3 MG/.3ML
0.3 INJECTION SUBCUTANEOUS
Qty: 2 SYRINGE | Refills: 3 | Status: SHIPPED | OUTPATIENT
Start: 2020-07-20 | End: 2020-07-20

## 2020-07-20 NOTE — PROGRESS NOTES
Identified pt with two pt identifiers(name and ). Chief Complaint   Patient presents with    Back Pain     mass left side back - noticed in feb    Medication Refill        Health Maintenance Due   Topic    Pneumococcal 0-64 years (1 of 1 - PPSV23)    Shingrix Vaccine Age 50> (1 of 2)    FOBT Q1Y Age 54-65        Wt Readings from Last 3 Encounters:   20 203 lb 6.4 oz (92.3 kg)   20 212 lb (96.2 kg)   20 211 lb 12.8 oz (96.1 kg)     Temp Readings from Last 3 Encounters:   20 98.2 °F (36.8 °C) (Oral)   20 97.5 °F (36.4 °C) (Oral)   20 98.6 °F (37 °C) (Oral)     BP Readings from Last 3 Encounters:   20 120/84   20 142/80   20 124/75     Pulse Readings from Last 3 Encounters:   20 89   20 83   20 71         Learning Assessment:  :     Learning Assessment 2014   PRIMARY LEARNER Patient Patient Patient Patient   HIGHEST LEVEL OF EDUCATION - PRIMARY LEARNER  - - - SOME COLLEGE   BARRIERS PRIMARY LEARNER - - - NONE   CO-LEARNER CAREGIVER No - - No   PRIMARY LANGUAGE ENGLISH ENGLISH ENGLISH ENGLISH   LEARNER PREFERENCE PRIMARY DEMONSTRATION DEMONSTRATION DEMONSTRATION DEMONSTRATION     - - - OTHER (COMMENT)   ANSWERED BY patient  patient patient self   RELATIONSHIP SELF SELF SELF SELF       Depression Screening:  :     3 most recent PHQ Screens 3/12/2020   Little interest or pleasure in doing things Not at all   Feeling down, depressed, irritable, or hopeless Not at all   Total Score PHQ 2 0       Fall Risk Assessment:  :     Fall Risk Assessment, last 12 mths 2018   Able to walk? Yes   Fall in past 12 months? No       Abuse Screening:  :     Abuse Screening Questionnaire 2020 3/11/2019 2018 2017 2015   Do you ever feel afraid of your partner? N N N N N   Are you in a relationship with someone who physically or mentally threatens you? N N N N N   Is it safe for you to go home?  Jesse Melgar Y       Coordination of Care Questionnaire:  :     1) Have you been to an emergency room, urgent care clinic since your last visit? no   Hospitalized since your last visit? no             2) Have you seen or consulted any other health care providers outside of [address] [place] [address] since your last visit? yes  Dr Jessica Eagle, Dr Ro Copeland, pain specialist  (Include any pap smears or colon screenings in this section.)    3) Do you have an Advance Directive on file? no  Are you interested in receiving information about Advance Directives? no    Reviewed record in preparation for visit and have obtained necessary documentation. Medication reconciliation up to date and corrected with patient at this time.

## 2020-07-20 NOTE — PATIENT INSTRUCTIONS
Learning About Benign Soft Tissue Tumors  What is a benign soft tissue tumor? A soft tissue tumor is a growth of abnormal cells in the body's soft tissues. These tissues include the muscles, lymph and blood vessels, nerves, and fat. They can also include cartilage and other connective tissues. When a tumor is benign (say \"devan-NYN\"), that means it's not cancer. Most soft tissue tumors are benign. Benign tumors don't spread to other tissues and organs. They usually aren't life-threatening. But they can cause problems if they grow too much, press on nerves, or cause pain. What are some common types of these tumors? Some common types of benign soft tissue tumors include:  Lipomas. These tumors form from fat cells. Angiolipomas are a type of lipoma made up of fat and blood vessels. Nerve sheath tumors. Tumors on a nerve may include schwannomas and neurofibromas. They might need to be removed. Benign synovial tumors. These appear around the tendons, near the knee, hip, elbow, or shoulder. Examples include giant cell tumor of the tendon sheath and synovial chondromatosis. Hemangiomas. These are tumors of the blood vessels. Desmoid tumors. These tumors commonly appear on the shoulder, chest, back, and thighs. Nodular fasciitis. These are most common in the arms. They can grow quickly. Other types of tumors may appear on the skin, belly, arms and legs, organs, and nerves. What are the symptoms? Sometimes a tumor can be felt as a bump under the skin. Or if the tumor is deep enough below the skin, you may not be able to feel it. You may also feel pain near the tumor if it's large or pressing on something. How are these tumors diagnosed? Your doctor will ask you about your symptoms and past health and will examine you. A physical exam can help your doctor diagnose some soft tissue tumors. Your doctor may find a tumor when taking X-rays or other imaging tests for another problem.   If your doctor isn't sure what the growth is and your symptoms could be signs of a tumor, you will get some tests. The tests can help make sure it's not cancer. They can also help your doctor figure out the best treatment for the tumor. · You may have one or more imaging tests to get a better look at the tumor. These may include:  ? X-rays. ? Ultrasound tests. ? CT scan.  ? MRI scan. · You may need blood tests and lab work. · You may need a biopsy so a sample of the tumor can be looked at under a microscope. This sample may also be used to test for biomarkers. They will help with planning treatment. Doctors may also look at other parts of your body for other tumors. How are they treated? Some benign soft tissue tumors that aren't causing problems can be watched over time. Some may remain stable or go away on their own. But if the tumor causes pain, is growing larger, or affects your movement, it may need to be removed. You may also choose to have these tumors removed if they bother you or if you don't like how they look. Doctors may remove some tumors with surgery. In some cases, other treatments, including medicines, may be used. Talk with your doctor or specialist about other types of treatments for the tumor. After your treatment, your doctor may want to check the area again to make sure that the growth doesn't come back. Follow-up care is a key part of your treatment and safety. Be sure to make and go to all appointments, and call your doctor if you are having problems. It's also a good idea to know your test results and keep a list of the medicines you take. Where can you learn more? Go to http://jesus-manasa.info/  Enter S210 in the search box to learn more about \"Learning About Benign Soft Tissue Tumors. \"  Current as of: August 22, 2019               Content Version: 12.5  © 1215-5774 Healthwise, Incorporated.    Care instructions adapted under license by [employer] (which disclaims liability or warranty for this information). If you have questions about a medical condition or this instruction, always ask your healthcare professional. [address] any warranty or liability for your use of this information.

## 2020-07-21 PROBLEM — E66.9 OBESITY (BMI 30.0-34.9): Status: ACTIVE | Noted: 2020-07-21

## 2020-07-21 NOTE — PROGRESS NOTES
Subjective:     Cady Rangel is a 44 y.o. female who presents for follow up of hypertension, hyperlipidemia and obesity. Diet and Lifestyle: generally follows a low fat low cholesterol diet, generally follows a low sodium diet, exercises sporadically, nonsmoker  Home BP Monitoring: is well controlled at home. Cardiovascular ROS: taking medications as instructed, no medication side effects noted, no TIA's, no chest pain on exertion, no dyspnea on exertion, no swelling of ankles. New concerns: she feels a tightness of muscle behind L scapula. Her massage therapist told her there is a lump there. Not painful, just tight. Patient Active Problem List    Diagnosis Date Noted    Obesity (BMI 30.0-34.9) 07/21/2020    Acquired hypothyroidism 01/12/2018    Hypercholesterolemia 01/25/2017    Essential hypertension, benign 01/25/2013    Fibromyalgia 11/20/2012    Cervical spondylosis 07/20/2012    Acne rosacea 02/21/2012    Esophageal reflux 02/21/2012    Mild intermittent asthma without complication 38/38/3368     Current Outpatient Medications   Medication Sig Dispense Refill    traMADoL (ULTRAM) 50 mg tablet Take 1 Tab by mouth every eight (8) hours as needed for Pain for up to 180 days. Indications: pain 90 Tab 5    doxycycline (VIBRAMYCIN) 100 mg capsule TAKE 1 CAPSULE TWICE A DAY 60 Cap 0    valACYclovir (VALTREX) 500 mg tablet TAKE 1 TABLET BY MOUTH EVERY DAY 30 Tab 2    methocarbamoL (ROBAXIN) 750 mg tablet TAKE 1 TABLET BY MOUTH EVERY DAY EVERY NIGHT 90 Tab 1    Synthroid 150 mcg tablet TAKE 1 TABLET BY MOUTH EVERY DAY BEFORE BREAKFAST 30 Tab 5    losartan-hydroCHLOROthiazide (HYZAAR) 100-25 mg per tablet TAKE 1 TABLET BY MOUTH EVERY DAY 30 Tab 5    montelukast (SINGULAIR) 10 mg tablet Take 1 Tab by mouth daily. 90 Tab 3    ergocalciferol (ERGOCALCIFEROL) 50,000 unit capsule Take 1 Cap by mouth every seven (7) days.  Indications: low vitamin D levels 15 Cap 3    TURMERIC PO Take  by mouth.      FLAXSEED PO Take  by mouth.  CANNABIDIOL, CBD, EXTRACT PO Take  by mouth.  multivitamin (ONE A DAY) tablet Take 1 Tab by mouth daily.  omeprazole (PRILOSEC) 20 mg capsule Take 20 mg by mouth daily.  predniSONE (DELTASONE) 5 mg tablet 20mg x 3 days, 15mg x 3 days, 10mg x 3 days, 5mg x 3 days 30 Tab 1    albuterol (VENTOLIN HFA) 90 mcg/actuation inhaler Take 2 Puffs by inhalation every four (4) hours as needed for Wheezing. 3 Inhaler 1    naproxen sodium (ALEVE) 220 mg tablet Take 220 mg by mouth two (2) times daily (with meals).  Estradiol (DIVIGEL) 1 mg/gram (0.1 %) glpk 1 Packet by TransDERmal route daily. 90 Packet 0    acetaminophen (TYLENOL EXTRA STRENGTH) 500 mg tablet Take 1,000 mg by mouth every six (6) hours as needed for Pain.        Allergies   Allergen Reactions    Monsel's [Ferric Subsulfate] Swelling     Extreme burning, skin sloughing    Other Plant, Animal, Environmental Unknown (comments)     Vinegar    Premarin [Conjugated Estrogens] Itching     Past Medical History:   Diagnosis Date    Abnormal Pap smear 1/2/2012    FUNMI -evaluation negative    Arthritis     Asthma     Atypical squamous cells of undetermined significance (ASCUS) on Papanicolaou smear of cervix 10/08/15    HPV Negative    Autoimmune disease ([facility] [address]     sjogrens    Bartholin's gland cyst     right marsupialization    Diverticulitis     in the past- has seen Dr. Genevieve Valerio for evaluation    Dyspareunia     Endometriosis     Fibromyalgia     GERD (gastroesophageal reflux disease)     Hiatal hernia     Hypercholesterolemia 1/25/2017    Hypertension     Hypothyroid     IBS (irritable bowel syndrome)     Lichen sclerosus     Pelvic pain     Rosacea     Sjogren's syndrome ([facility] [address]     sees Dr. 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    vulvar biopsy done-suggestive of LS     Past Surgical History: Procedure Laterality Date    HX CHOLECYSTECTOMY      HX COLPOSCOPY  2/14/12    No dysplasia    HX DILATION AND CURETTAGE  2/14/2012    benign tissue- problems with anesthesia afterwards    HX LAPAROSCOPIC SUPRACERVICAL HYSTERECTOMY  11/25/2013    w/ RSO    HX OOPHORECTOMY      HX ORTHOPAEDIC Left     ACLX2    HX OTHER SURGICAL      Bartholins Marsupialization    HX OTHER SURGICAL  02/07    LSO--Laparotomy w/ [place] also    HX OTHER SURGICAL  11/08    vulvar biopsy     Family History   Problem Relation Age of Onset    Heart Disease Mother         pacemaker, defibrillator    Osteoporosis Mother     Diabetes Brother     Hypertension Father      Social History     Tobacco Use    Smoking status: Never Smoker    Smokeless tobacco: Never Used   Substance Use Topics    Alcohol use: Yes     Alcohol/week: 0.0 standard drinks     Comment: very rarely-less than 5X/year             Review of Systems, additional:  Pertinent items are noted in HPI. Objective:     Visit Vitals  /84 (BP 1 Location: Right arm, BP Patient Position: Sitting) Comment: manual   Pulse 89   Temp 98.2 °F (36.8 °C) (Oral)   Resp 20   Ht 5' 4\" (1.626 m)   Wt 203 lb 6.4 oz (92.3 kg)   LMP 11/05/2013   SpO2 96%   BMI 34.91 kg/m²     Appearance: alert, well appearing, and in no distress and overweight. General exam: CVS exam BP noted to be well controlled today in office, S1, S2 normal, no gallop, no murmur, chest clear, no JVD, no HSM, no edema. Back exam: no discrete mass palpable left posterior thoracic region  Lab review: orders written for new lab studies as appropriate; see orders. Assessment/Plan:     hypertension well controlled, hyperlipidemia , prediabetes. orders and follow up as documented in patient record. ICD-10-CM ICD-9-CM    1. Essential hypertension, benign  I10 401.1    2.  Bee sting-induced anaphylaxis, accidental or unintentional, sequela  T63.441S 909.1 EPINEPHrine (EPIPEN) 0.3 mg/0.3 mL injection E929.2    3. Fibromyalgia  M79.7 729.1 traMADoL (ULTRAM) 50 mg tablet   4. Chronic pain of left knee  M25.562 719.46 traMADoL (ULTRAM) 50 mg tablet    G89.29 338.29    5. Acquired hypothyroidism  E03.9 244.9 TSH 3RD GENERATION      T4, FREE   6. Vitamin D deficiency  E55.9 268.9 VITAMIN D, 25 HYDROXY   7. Hypercholesterolemia  E78.00 272.0 LIPID PANEL   8. Encounter for long-term current use of medication  Z79.899 V58.69 CBC WITH AUTOMATED DIFF      METABOLIC PANEL, COMPREHENSIVE      MAGNESIUM   9. Hyperglycemia  R73.9 790.29 HEMOGLOBIN A1C WITH EAG   10. Vitamin B 12 deficiency  E53.8 266.2 VITAMIN B12   11. Dysthymia  F34.1 300.4    12. Paresthesia  R20.2 782.0    13. Encounter for immunization  Z23 V03.89 varicella-zoster recombinant, PF, (Shingrix, PF,) 50 mcg/0.5 mL susr injection      pneumococcal 23-valent (Pneumovax-23) 25 mcg/0.5 mL injection     Labs drawn. Med refills ordered.   Order Shingrix and Pneumovax  FU 6 months

## 2020-07-23 NOTE — PROGRESS NOTES
Good levels Vit D, Vit B 12, thyroid, magnesium. Potassium is a little low. Take extra dietary sources of potassium. Normal sugar, kidney, and liver tests. Cholesterol numbers are higher compared to last year. Need to follow low fat diet. Normal blood counts.

## 2020-08-08 DIAGNOSIS — L71.9 ACNE ROSACEA: ICD-10-CM

## 2020-08-10 RX ORDER — DOXYCYCLINE 100 MG/1
CAPSULE ORAL
Qty: 60 CAP | Refills: 5 | Status: SHIPPED | OUTPATIENT
Start: 2020-08-10 | End: 2021-02-16

## 2020-09-10 DIAGNOSIS — E03.9 ACQUIRED HYPOTHYROIDISM: ICD-10-CM

## 2020-09-10 DIAGNOSIS — I10 ESSENTIAL HYPERTENSION, BENIGN: ICD-10-CM

## 2020-09-10 RX ORDER — LEVOTHYROXINE SODIUM 150 MCG
TABLET ORAL
Qty: 30 TAB | Refills: 5 | Status: SHIPPED | OUTPATIENT
Start: 2020-09-10 | End: 2021-02-12

## 2020-09-10 RX ORDER — LOSARTAN POTASSIUM AND HYDROCHLOROTHIAZIDE 25; 100 MG/1; MG/1
TABLET ORAL
Qty: 30 TAB | Refills: 5 | Status: SHIPPED | OUTPATIENT
Start: 2020-09-10 | End: 2021-02-12

## 2020-09-27 DIAGNOSIS — B00.9 HERPES SIMPLEX INFECTION OF SKIN: ICD-10-CM

## 2020-09-27 RX ORDER — VALACYCLOVIR HYDROCHLORIDE 500 MG/1
TABLET, FILM COATED ORAL
Qty: 30 TAB | Refills: 2 | Status: SHIPPED | OUTPATIENT
Start: 2020-09-27 | End: 2020-12-18

## 2020-11-16 DIAGNOSIS — M79.7 FIBROMYALGIA: ICD-10-CM

## 2020-11-16 RX ORDER — METHOCARBAMOL 750 MG/1
TABLET, FILM COATED ORAL
Qty: 30 TAB | Refills: 5 | Status: SHIPPED | OUTPATIENT
Start: 2020-11-16 | End: 2021-02-26 | Stop reason: ALTCHOICE

## 2020-12-18 DIAGNOSIS — B00.9 HERPES SIMPLEX INFECTION OF SKIN: ICD-10-CM

## 2020-12-18 RX ORDER — VALACYCLOVIR HYDROCHLORIDE 500 MG/1
TABLET, FILM COATED ORAL
Qty: 30 TAB | Refills: 2 | Status: SHIPPED | OUTPATIENT
Start: 2020-12-18 | End: 2021-02-25

## 2021-01-16 DIAGNOSIS — E55.9 VITAMIN D DEFICIENCY: ICD-10-CM

## 2021-01-17 RX ORDER — ERGOCALCIFEROL 1.25 MG/1
50000 CAPSULE ORAL
Qty: 5 CAP | Refills: 11 | Status: SHIPPED | OUTPATIENT
Start: 2021-01-17 | End: 2022-02-12

## 2021-02-16 DIAGNOSIS — L71.9 ACNE ROSACEA: ICD-10-CM

## 2021-02-16 RX ORDER — DOXYCYCLINE 100 MG/1
CAPSULE ORAL
Qty: 60 CAP | Refills: 5 | Status: SHIPPED | OUTPATIENT
Start: 2021-02-16 | End: 2021-08-08

## 2021-02-25 DIAGNOSIS — B00.9 HERPES SIMPLEX INFECTION OF SKIN: ICD-10-CM

## 2021-02-25 RX ORDER — VALACYCLOVIR HYDROCHLORIDE 500 MG/1
TABLET, FILM COATED ORAL
Qty: 90 TAB | Refills: 1 | Status: SHIPPED | OUTPATIENT
Start: 2021-02-25 | End: 2021-06-04

## 2021-02-26 ENCOUNTER — OFFICE VISIT (OUTPATIENT)
Dept: FAMILY MEDICINE CLINIC | Age: 51
End: 2021-02-26

## 2021-02-26 VITALS
WEIGHT: 201 LBS | TEMPERATURE: 98 F | BODY MASS INDEX: 34.31 KG/M2 | HEART RATE: 88 BPM | DIASTOLIC BLOOD PRESSURE: 68 MMHG | RESPIRATION RATE: 22 BRPM | SYSTOLIC BLOOD PRESSURE: 112 MMHG | OXYGEN SATURATION: 98 % | HEIGHT: 64 IN

## 2021-02-26 DIAGNOSIS — I10 ESSENTIAL HYPERTENSION, BENIGN: ICD-10-CM

## 2021-02-26 DIAGNOSIS — G89.29 CHRONIC PAIN OF LEFT KNEE: ICD-10-CM

## 2021-02-26 DIAGNOSIS — M79.7 FIBROMYALGIA: ICD-10-CM

## 2021-02-26 DIAGNOSIS — J45.20 MILD INTERMITTENT ASTHMA WITHOUT COMPLICATION: ICD-10-CM

## 2021-02-26 DIAGNOSIS — E03.9 ACQUIRED HYPOTHYROIDISM: ICD-10-CM

## 2021-02-26 DIAGNOSIS — M25.562 CHRONIC PAIN OF LEFT KNEE: ICD-10-CM

## 2021-02-26 PROCEDURE — 99213 OFFICE O/P EST LOW 20 MIN: CPT | Performed by: FAMILY MEDICINE

## 2021-02-26 RX ORDER — LEVOTHYROXINE SODIUM 150 MCG
TABLET ORAL
Qty: 90 TAB | Refills: 1 | Status: SHIPPED | OUTPATIENT
Start: 2021-02-26 | End: 2021-07-17

## 2021-02-26 RX ORDER — LOSARTAN POTASSIUM AND HYDROCHLOROTHIAZIDE 25; 100 MG/1; MG/1
1 TABLET ORAL DAILY
Qty: 90 TAB | Refills: 1 | Status: SHIPPED | OUTPATIENT
Start: 2021-02-26 | End: 2021-07-17

## 2021-02-26 RX ORDER — TRAMADOL HYDROCHLORIDE 50 MG/1
50 TABLET ORAL
Qty: 90 TAB | Refills: 5 | Status: SHIPPED | OUTPATIENT
Start: 2021-02-26 | End: 2021-08-27 | Stop reason: SDUPTHER

## 2021-02-26 RX ORDER — MONTELUKAST SODIUM 10 MG/1
TABLET ORAL
Qty: 90 TAB | Refills: 1 | Status: SHIPPED | OUTPATIENT
Start: 2021-02-26 | End: 2021-08-27 | Stop reason: SDUPTHER

## 2021-02-26 NOTE — PROGRESS NOTES
Identified pt with two pt identifiers(name and ). Chief Complaint   Patient presents with    Medication Refill     tramadol    Hypertension    Fibromyalgia        Health Maintenance Due   Topic    Pneumococcal 0-64 years (1 of 1 - PPSV23)    COVID-19 Vaccine (1 of 2)    Shingrix Vaccine Age 50> (2 of 2)       Wt Readings from Last 3 Encounters:   21 201 lb (91.2 kg)   20 203 lb 6.4 oz (92.3 kg)   20 212 lb (96.2 kg)     Temp Readings from Last 3 Encounters:   21 98 °F (36.7 °C) (Oral)   20 98.2 °F (36.8 °C) (Oral)   20 97.5 °F (36.4 °C) (Oral)     BP Readings from Last 3 Encounters:   21 112/68   20 120/84   20 142/80     Pulse Readings from Last 3 Encounters:   21 88   20 89   20 83         Learning Assessment:  :     Learning Assessment 2014   PRIMARY LEARNER Patient Patient Patient Patient   HIGHEST LEVEL OF EDUCATION - PRIMARY LEARNER  - - - SOME COLLEGE   BARRIERS PRIMARY LEARNER - - - NONE   CO-LEARNER CAREGIVER No - - No   PRIMARY LANGUAGE ENGLISH ENGLISH ENGLISH ENGLISH   LEARNER PREFERENCE PRIMARY DEMONSTRATION DEMONSTRATION DEMONSTRATION DEMONSTRATION     - - - OTHER (COMMENT)   ANSWERED BY patient  patient patient self   RELATIONSHIP SELF SELF SELF SELF       Depression Screening:  :     3 most recent PHQ Screens 2021   Little interest or pleasure in doing things Not at all   Feeling down, depressed, irritable, or hopeless Not at all   Total Score PHQ 2 0       Fall Risk Assessment:  :     Fall Risk Assessment, last 12 mths 2018   Able to walk? Yes   Fall in past 12 months? No       Abuse Screening:  :     Abuse Screening Questionnaire 2021 2020 3/11/2019 2018 2017 2015   Do you ever feel afraid of your partner? N N N N N N   Are you in a relationship with someone who physically or mentally threatens you? N N N N N N   Is it safe for you to go home?  Chika Solano Y Y Y Y       Coordination of Care Questionnaire:  :     1) Have you been to an emergency room, urgent care clinic since your last visit? no   Hospitalized since your last visit? no             2) Have you seen or consulted any other health care providers outside of [facility] since your last visit? no  (Include any pap smears or colon screenings in this section.)    3) Do you have an Advance Directive on file? no  Are you interested in receiving information about Advance Directives? no    Reviewed record in preparation for visit and have obtained necessary documentation.

## 2021-02-27 NOTE — PROGRESS NOTES
Subjective:     Ani Varela is a 44 y.o. female who presents for follow up of hypertension and obesity. Also FU hypothyroidism. Diet and Lifestyle: generally follows a low fat low cholesterol diet, generally follows a low sodium diet  Home BP Monitoring: is not measured at home    Cardiovascular ROS: taking medications as instructed, no medication side effects noted, no TIA's, no chest pain on exertion, no dyspnea on exertion, no swelling of ankles. New concerns: she has tried medical marijuana for sleep and chronic pain and it works well. Not taking as much Tramadol. For now also taking CBD. Patient Active Problem List    Diagnosis Date Noted    Obesity (BMI 30.0-34.9) 07/21/2020    Acquired hypothyroidism 01/12/2018    Hypercholesterolemia 01/25/2017    Essential hypertension, benign 01/25/2013    Fibromyalgia 11/20/2012    Cervical spondylosis 07/20/2012    Acne rosacea 02/21/2012    Esophageal reflux 02/21/2012    Mild intermittent asthma without complication 21/05/0404     Current Outpatient Medications   Medication Sig Dispense Refill    traMADoL (ULTRAM) 50 mg tablet Take 1 Tab by mouth every eight (8) hours as needed for Pain for up to 180 days. Indications: pain 90 Tab 5    losartan-hydroCHLOROthiazide (HYZAAR) 100-25 mg per tablet Take 1 Tab by mouth daily. Indications: high blood pressure 90 Tab 1    montelukast (SINGULAIR) 10 mg tablet TAKE 1 TABLET BY MOUTH EVERY DAY 90 Tab 1    Synthroid 150 mcg tablet TAKE 1 TABLET BY MOUTH EVERY DAY BEFORE BREAKFAST 90 Tab 1    valACYclovir (VALTREX) 500 mg tablet TAKE 1 TABLET BY MOUTH EVERY DAY 90 Tab 1    doxycycline (VIBRAMYCIN) 100 mg capsule TAKE 1 CAPSULE BY MOUTH TWICE A DAY 60 Cap 5    ergocalciferol (ERGOCALCIFEROL) 1,250 mcg (50,000 unit) capsule TAKE 1 CAP BY MOUTH EVERY SEVEN (7) DAYS.  INDICATIONS: LOW VITAMIN D LEVELS 5 Cap 11    albuterol (VENTOLIN HFA) 90 mcg/actuation inhaler Take 2 Puffs by inhalation every four (4) hours as needed for Wheezing. 3 Inhaler 1    TURMERIC PO Take  by mouth.  FLAXSEED PO Take  by mouth.  CANNABIDIOL, CBD, EXTRACT PO Take  by mouth.  naproxen sodium (ALEVE) 220 mg tablet Take 220 mg by mouth two (2) times daily (with meals).  acetaminophen (TYLENOL EXTRA STRENGTH) 500 mg tablet Take 1,000 mg by mouth every six (6) hours as needed for Pain.  multivitamin (ONE A DAY) tablet Take 1 Tab by mouth daily.  omeprazole (PRILOSEC) 20 mg capsule Take 20 mg by mouth daily.          Allergies   Allergen Reactions    Monsel's [Ferric Subsulfate] Swelling     Extreme burning, skin sloughing    Other Plant, Animal, Environmental Unknown (comments)     Vinegar    Premarin [Conjugated Estrogens] Itching     Past Medical History:   Diagnosis Date    Abnormal Pap smear 1/2/2012    FUNMI -evaluation negative    Arthritis     Asthma     Atypical squamous cells of undetermined significance (ASCUS) on Papanicolaou smear of cervix 10/08/15    HPV Negative    Autoimmune disease ([facility] [address]     sjogrens    Bartholin's gland cyst     right marsupialization    Diverticulitis     in the past- has seen Dr. Edison Small for evaluation    Dyspareunia     Endometriosis     Fibromyalgia     GERD (gastroesophageal reflux disease)     Hiatal hernia     Hypercholesterolemia 1/25/2017    Hypertension     Hypothyroid     IBS (irritable bowel syndrome)     Lichen sclerosus     Pelvic pain     Rosacea     Sjogren's syndrome ([address]     sees Dr. 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    vulvar biopsy done-suggestive of LS     Past Surgical History:   Procedure Laterality Date    HX CHOLECYSTECTOMY      HX COLPOSCOPY  2/14/12    No dysplasia    HX DILATION AND CURETTAGE  2/14/2012    benign tissue- problems with anesthesia afterwards    HX LAPAROSCOPIC SUPRACERVICAL HYSTERECTOMY  11/25/2013    w/ RSO    HX OOPHORECTOMY      HX ORTHOPAEDIC Left     ACLX2    HX OTHER SURGICAL      Bartholins Marsupialization    HX OTHER SURGICAL  02/07    LSO--Laparotomy w/ [place] also    HX OTHER SURGICAL  11/08    vulvar biopsy     Family History   Problem Relation Age of Onset    Heart Disease Mother         pacemaker, defibrillator    Osteoporosis Mother     Diabetes Brother     Hypertension Father      Social History     Tobacco Use    Smoking status: Never Smoker    Smokeless tobacco: Never Used   Substance Use Topics    Alcohol use: Yes     Alcohol/week: 0.0 standard drinks     Comment: very rarely-less than 5X/year             Review of Systems, additional:  Pertinent items are noted in HPI. Objective:     Visit Vitals  /68 (BP 1 Location: Right arm, BP Patient Position: Sitting, BP Cuff Size: Adult)   Pulse 88   Temp 98 °F (36.7 °C) (Oral)   Resp 22   Ht 5' 4\" (1.626 m)   Wt 201 lb (91.2 kg)   LMP 11/05/2013   SpO2 98%   BMI 34.50 kg/m²     Appearance: alert, well appearing, and in no distress and overweight. General exam: CVS exam BP noted to be well controlled today in office, S1, S2 normal, no gallop, no murmur, chest clear, no JVD, no HSM, no edema. Lab review: labs are reviewed, up to date  Assessment/Plan:     hypertension well controlled. orders and follow up as documented in patient record. ICD-10-CM ICD-9-CM    1. Fibromyalgia  M79.7 729.1 traMADoL (ULTRAM) 50 mg tablet   2. Chronic pain of left knee  M25.562 719.46 traMADoL (ULTRAM) 50 mg tablet    G89.29 338.29    3. Essential hypertension, benign  I10 401.1 losartan-hydroCHLOROthiazide (HYZAAR) 100-25 mg per tablet   4. Mild intermittent asthma without complication  T81.71 455.00 montelukast (SINGULAIR) 10 mg tablet   5.  Acquired hypothyroidism  E03.9 244.9 Synthroid 150 mcg tablet

## 2021-05-11 DIAGNOSIS — M79.7 FIBROMYALGIA: ICD-10-CM

## 2021-05-11 RX ORDER — METHOCARBAMOL 750 MG/1
TABLET, FILM COATED ORAL
Qty: 30 TAB | Refills: 5 | Status: SHIPPED | OUTPATIENT
Start: 2021-05-11 | End: 2021-08-27 | Stop reason: ALTCHOICE

## 2021-06-04 DIAGNOSIS — B00.9 HERPES SIMPLEX INFECTION OF SKIN: ICD-10-CM

## 2021-06-04 RX ORDER — VALACYCLOVIR HYDROCHLORIDE 500 MG/1
TABLET, FILM COATED ORAL
Qty: 90 TABLET | Refills: 1 | Status: SHIPPED | OUTPATIENT
Start: 2021-06-04 | End: 2022-02-20

## 2021-08-08 DIAGNOSIS — L71.9 ACNE ROSACEA: ICD-10-CM

## 2021-08-08 RX ORDER — DOXYCYCLINE 100 MG/1
CAPSULE ORAL
Qty: 60 CAPSULE | Refills: 5 | Status: SHIPPED | OUTPATIENT
Start: 2021-08-08 | End: 2022-02-10

## 2021-08-27 ENCOUNTER — OFFICE VISIT (OUTPATIENT)
Dept: FAMILY MEDICINE CLINIC | Age: 51
End: 2021-08-27
Payer: COMMERCIAL

## 2021-08-27 VITALS
WEIGHT: 175 LBS | BODY MASS INDEX: 29.88 KG/M2 | SYSTOLIC BLOOD PRESSURE: 106 MMHG | TEMPERATURE: 98 F | RESPIRATION RATE: 18 BRPM | OXYGEN SATURATION: 99 % | HEART RATE: 65 BPM | HEIGHT: 64 IN | DIASTOLIC BLOOD PRESSURE: 70 MMHG

## 2021-08-27 DIAGNOSIS — E03.9 ACQUIRED HYPOTHYROIDISM: ICD-10-CM

## 2021-08-27 DIAGNOSIS — E53.8 VITAMIN B 12 DEFICIENCY: ICD-10-CM

## 2021-08-27 DIAGNOSIS — G89.29 CHRONIC PAIN OF LEFT KNEE: ICD-10-CM

## 2021-08-27 DIAGNOSIS — Z00.00 ROUTINE ADULT HEALTH MAINTENANCE: Primary | ICD-10-CM

## 2021-08-27 DIAGNOSIS — M79.7 FIBROMYALGIA: ICD-10-CM

## 2021-08-27 DIAGNOSIS — Z12.31 ENCOUNTER FOR SCREENING MAMMOGRAM FOR BREAST CANCER: ICD-10-CM

## 2021-08-27 DIAGNOSIS — J45.20 MILD INTERMITTENT ASTHMA WITHOUT COMPLICATION: ICD-10-CM

## 2021-08-27 DIAGNOSIS — M25.562 CHRONIC PAIN OF LEFT KNEE: ICD-10-CM

## 2021-08-27 DIAGNOSIS — E55.9 VITAMIN D DEFICIENCY: ICD-10-CM

## 2021-08-27 PROCEDURE — 99396 PREV VISIT EST AGE 40-64: CPT | Performed by: FAMILY MEDICINE

## 2021-08-27 RX ORDER — TRAMADOL HYDROCHLORIDE 50 MG/1
50 TABLET ORAL
Qty: 90 TABLET | Refills: 5 | Status: SHIPPED | OUTPATIENT
Start: 2021-08-27 | End: 2022-02-23

## 2021-08-27 RX ORDER — LEVOTHYROXINE SODIUM 150 MCG
150 TABLET ORAL
Qty: 90 TABLET | Refills: 1 | Status: SHIPPED | OUTPATIENT
Start: 2021-08-27 | End: 2021-09-09 | Stop reason: DRUGHIGH

## 2021-08-27 RX ORDER — MONTELUKAST SODIUM 10 MG/1
TABLET ORAL
Qty: 90 TABLET | Refills: 1 | Status: SHIPPED | OUTPATIENT
Start: 2021-08-27 | End: 2022-02-20

## 2021-08-27 NOTE — PATIENT INSTRUCTIONS

## 2021-08-27 NOTE — PROGRESS NOTES
Identified pt with two pt identifiers(name and ). Chief Complaint   Patient presents with    Complete Physical    Labs     NPO since midnight    Medication Refill        Health Maintenance Due   Topic    Shingrix Vaccine Age 50> (2 of 2)       Wt Readings from Last 3 Encounters:   21 175 lb (79.4 kg)   21 201 lb (91.2 kg)   20 203 lb 6.4 oz (92.3 kg)     Temp Readings from Last 3 Encounters:   21 98 °F (36.7 °C) (Temporal)   21 98 °F (36.7 °C) (Oral)   20 98.2 °F (36.8 °C) (Oral)     BP Readings from Last 3 Encounters:   21 106/70   21 112/68   20 120/84     Pulse Readings from Last 3 Encounters:   21 65   21 88   20 89         Learning Assessment:  :     Learning Assessment 2014   PRIMARY LEARNER Patient Patient Patient Patient   HIGHEST LEVEL OF EDUCATION - PRIMARY LEARNER  - - - SOME COLLEGE   BARRIERS PRIMARY LEARNER - - - NONE   CO-LEARNER CAREGIVER No - - No   PRIMARY LANGUAGE ENGLISH ENGLISH ENGLISH ENGLISH   LEARNER PREFERENCE PRIMARY DEMONSTRATION DEMONSTRATION DEMONSTRATION DEMONSTRATION     - - - OTHER (COMMENT)   ANSWERED BY patient  patient patient self   RELATIONSHIP SELF SELF SELF SELF       Depression Screening:  :     3 most recent PHQ Screens 2021   Little interest or pleasure in doing things Not at all   Feeling down, depressed, irritable, or hopeless Not at all   Total Score PHQ 2 0       Fall Risk Assessment:  :     Fall Risk Assessment, last 12 mths 2018   Able to walk? Yes   Fall in past 12 months? No       Abuse Screening:  :     Abuse Screening Questionnaire 2021 2021 2020 3/11/2019 2018 2017 2015   Do you ever feel afraid of your partner? N N N N N N N   Are you in a relationship with someone who physically or mentally threatens you? N N N N N N N   Is it safe for you to go home?  Benjamin Miranda       Coordination of Care Questionnaire:  :     1) Have you been to an emergency room, urgent care clinic since your last visit? no   Hospitalized since your last visit? no             2) Have you seen or consulted any other health care providers outside of [address] [place] [address] since your last visit? no  (Include any pap smears or colon screenings in this section.)    3) Do you have an Advance Directive on file? no  Are you interested in receiving information about Advance Directives? no    Patient is accompanied by self. Reviewed record in preparation for visit and have obtained necessary documentation. Medication reconciliation up to date and corrected with patient at this time.

## 2021-08-30 LAB
25(OH)D3+25(OH)D2 SERPL-MCNC: 62 NG/ML (ref 30–100)
ALBUMIN SERPL-MCNC: 4.6 G/DL (ref 3.8–4.9)
ALBUMIN/GLOB SERPL: 1.8 {RATIO} (ref 1.2–2.2)
ALP SERPL-CCNC: 93 IU/L (ref 48–121)
ALT SERPL-CCNC: 10 IU/L (ref 0–32)
AST SERPL-CCNC: 21 IU/L (ref 0–40)
BASOPHILS # BLD AUTO: 0 X10E3/UL (ref 0–0.2)
BASOPHILS NFR BLD AUTO: 1 %
BILIRUB SERPL-MCNC: 0.4 MG/DL (ref 0–1.2)
BUN SERPL-MCNC: 14 MG/DL (ref 6–24)
BUN/CREAT SERPL: 18 (ref 9–23)
CALCIUM SERPL-MCNC: 10.2 MG/DL (ref 8.7–10.2)
CHLORIDE SERPL-SCNC: 97 MMOL/L (ref 96–106)
CHOLEST SERPL-MCNC: 216 MG/DL (ref 100–199)
CO2 SERPL-SCNC: 31 MMOL/L (ref 20–29)
CREAT SERPL-MCNC: 0.78 MG/DL (ref 0.57–1)
EOSINOPHIL # BLD AUTO: 0.2 X10E3/UL (ref 0–0.4)
EOSINOPHIL NFR BLD AUTO: 2 %
ERYTHROCYTE [DISTWIDTH] IN BLOOD BY AUTOMATED COUNT: 13.2 % (ref 11.7–15.4)
EST. AVERAGE GLUCOSE BLD GHB EST-MCNC: 111 MG/DL
GLOBULIN SER CALC-MCNC: 2.5 G/DL (ref 1.5–4.5)
GLUCOSE SERPL-MCNC: 102 MG/DL (ref 65–99)
HBA1C MFR BLD: 5.5 % (ref 4.8–5.6)
HCT VFR BLD AUTO: 43 % (ref 34–46.6)
HDLC SERPL-MCNC: 75 MG/DL
HGB BLD-MCNC: 13.5 G/DL (ref 11.1–15.9)
IMM GRANULOCYTES # BLD AUTO: 0 X10E3/UL (ref 0–0.1)
IMM GRANULOCYTES NFR BLD AUTO: 0 %
IMP & REVIEW OF LAB RESULTS: NORMAL
LDLC SERPL CALC-MCNC: 126 MG/DL (ref 0–99)
LYMPHOCYTES # BLD AUTO: 1.7 X10E3/UL (ref 0.7–3.1)
LYMPHOCYTES NFR BLD AUTO: 26 %
MCH RBC QN AUTO: 28.8 PG (ref 26.6–33)
MCHC RBC AUTO-ENTMCNC: 31.4 G/DL (ref 31.5–35.7)
MCV RBC AUTO: 92 FL (ref 79–97)
MONOCYTES # BLD AUTO: 0.4 X10E3/UL (ref 0.1–0.9)
MONOCYTES NFR BLD AUTO: 6 %
NEUTROPHILS # BLD AUTO: 4.1 X10E3/UL (ref 1.4–7)
NEUTROPHILS NFR BLD AUTO: 65 %
PLATELET # BLD AUTO: 318 X10E3/UL (ref 150–450)
POTASSIUM SERPL-SCNC: 3.8 MMOL/L (ref 3.5–5.2)
PROT SERPL-MCNC: 7.1 G/DL (ref 6–8.5)
RBC # BLD AUTO: 4.69 X10E6/UL (ref 3.77–5.28)
SODIUM SERPL-SCNC: 142 MMOL/L (ref 134–144)
T4 FREE SERPL-MCNC: 2.01 NG/DL (ref 0.82–1.77)
TRIGL SERPL-MCNC: 83 MG/DL (ref 0–149)
TSH SERPL DL<=0.005 MIU/L-ACNC: 0.05 UIU/ML (ref 0.45–4.5)
VIT B12 SERPL-MCNC: 718 PG/ML (ref 232–1245)
VLDLC SERPL CALC-MCNC: 15 MG/DL (ref 5–40)
WBC # BLD AUTO: 6.3 X10E3/UL (ref 3.4–10.8)

## 2021-09-09 ENCOUNTER — TELEPHONE (OUTPATIENT)
Dept: FAMILY MEDICINE CLINIC | Age: 51
End: 2021-09-09

## 2021-09-09 DIAGNOSIS — E03.9 ACQUIRED HYPOTHYROIDISM: Primary | ICD-10-CM

## 2021-09-09 RX ORDER — LEVOTHYROXINE SODIUM 125 UG/1
125 TABLET ORAL
Qty: 90 TABLET | Refills: 1 | Status: SHIPPED | OUTPATIENT
Start: 2021-09-09 | End: 2022-02-25 | Stop reason: SDUPTHER

## 2021-09-10 NOTE — TELEPHONE ENCOUNTER
Need lower dose Synthroid. Plan repeat thyroid level in 3 months. I sent new RX to [facility].  Please let pt know.

## 2021-09-10 NOTE — PROGRESS NOTES
Labs show thyroid level is TOO HIGH. Need to lower dose of Synthroid. I will send new prescription for lower dose. Plan to repeat thyroid labs in 3 months. Normal blood counts. Borderline elevated sugar but no diabetes. Normal kidney and liver tests. Cholesterol numbers are stable. Vit D level is good. Normal Vit B 12 level.

## 2021-10-03 DIAGNOSIS — I10 ESSENTIAL HYPERTENSION, BENIGN: ICD-10-CM

## 2021-10-03 RX ORDER — LOSARTAN POTASSIUM AND HYDROCHLOROTHIAZIDE 25; 100 MG/1; MG/1
1 TABLET ORAL DAILY
Qty: 90 TABLET | Refills: 3 | Status: SHIPPED | OUTPATIENT
Start: 2021-10-03 | End: 2022-01-19 | Stop reason: RX

## 2021-11-10 DIAGNOSIS — M79.7 FIBROMYALGIA: ICD-10-CM

## 2021-11-10 RX ORDER — METHOCARBAMOL 750 MG/1
TABLET, FILM COATED ORAL
Qty: 30 TABLET | Refills: 5 | Status: SHIPPED | OUTPATIENT
Start: 2021-11-10 | End: 2022-05-17

## 2021-11-19 DIAGNOSIS — E03.9 ACQUIRED HYPOTHYROIDISM: ICD-10-CM

## 2021-11-19 RX ORDER — LEVOTHYROXINE SODIUM 150 MCG
TABLET ORAL
Qty: 90 TABLET | Refills: 0 | OUTPATIENT
Start: 2021-11-19

## 2022-01-19 ENCOUNTER — TELEPHONE (OUTPATIENT)
Dept: FAMILY MEDICINE CLINIC | Age: 52
End: 2022-01-19

## 2022-01-19 DIAGNOSIS — I10 ESSENTIAL HYPERTENSION, BENIGN: Primary | ICD-10-CM

## 2022-01-19 RX ORDER — LOSARTAN POTASSIUM 100 MG/1
100 TABLET ORAL DAILY
Qty: 90 TABLET | Refills: 1 | Status: SHIPPED | OUTPATIENT
Start: 2022-01-19 | End: 2022-07-16

## 2022-01-19 RX ORDER — HYDROCHLOROTHIAZIDE 25 MG/1
25 TABLET ORAL DAILY
Qty: 90 TABLET | Refills: 1 | Status: SHIPPED | OUTPATIENT
Start: 2022-01-19 | End: 2022-07-16

## 2022-01-19 NOTE — TELEPHONE ENCOUNTER
Losartan -25 mg is on back order. Need to refill as 2 separate scripts.   Sent to [facility].

## 2022-02-10 DIAGNOSIS — L71.9 ACNE ROSACEA: ICD-10-CM

## 2022-02-10 RX ORDER — DOXYCYCLINE 100 MG/1
CAPSULE ORAL
Qty: 60 CAPSULE | Refills: 5 | Status: SHIPPED | OUTPATIENT
Start: 2022-02-10 | End: 2022-08-25

## 2022-02-12 DIAGNOSIS — E55.9 VITAMIN D DEFICIENCY: ICD-10-CM

## 2022-02-12 RX ORDER — ERGOCALCIFEROL 1.25 MG/1
50000 CAPSULE ORAL
Qty: 4 CAPSULE | Refills: 14 | Status: SHIPPED | OUTPATIENT
Start: 2022-02-12

## 2022-02-19 DIAGNOSIS — J45.20 MILD INTERMITTENT ASTHMA WITHOUT COMPLICATION: ICD-10-CM

## 2022-02-20 DIAGNOSIS — B00.9 HERPES SIMPLEX INFECTION OF SKIN: ICD-10-CM

## 2022-02-20 RX ORDER — MONTELUKAST SODIUM 10 MG/1
TABLET ORAL
Qty: 90 TABLET | Refills: 1 | Status: SHIPPED | OUTPATIENT
Start: 2022-02-20 | End: 2022-05-30

## 2022-02-20 RX ORDER — VALACYCLOVIR HYDROCHLORIDE 500 MG/1
TABLET, FILM COATED ORAL
Qty: 90 TABLET | Refills: 1 | Status: SHIPPED | OUTPATIENT
Start: 2022-02-20 | End: 2022-05-30

## 2022-02-25 ENCOUNTER — OFFICE VISIT (OUTPATIENT)
Dept: FAMILY MEDICINE CLINIC | Age: 52
End: 2022-02-25
Payer: COMMERCIAL

## 2022-02-25 VITALS
RESPIRATION RATE: 14 BRPM | TEMPERATURE: 98.2 F | OXYGEN SATURATION: 98 % | BODY MASS INDEX: 28.48 KG/M2 | HEART RATE: 78 BPM | SYSTOLIC BLOOD PRESSURE: 132 MMHG | DIASTOLIC BLOOD PRESSURE: 78 MMHG | HEIGHT: 64 IN | WEIGHT: 166.8 LBS

## 2022-02-25 DIAGNOSIS — M79.7 FIBROMYALGIA: ICD-10-CM

## 2022-02-25 DIAGNOSIS — E03.9 ACQUIRED HYPOTHYROIDISM: ICD-10-CM

## 2022-02-25 DIAGNOSIS — I10 ESSENTIAL HYPERTENSION, BENIGN: ICD-10-CM

## 2022-02-25 DIAGNOSIS — Z79.899 ENCOUNTER FOR LONG-TERM CURRENT USE OF MEDICATION: ICD-10-CM

## 2022-02-25 DIAGNOSIS — J45.20 MILD INTERMITTENT ASTHMA WITHOUT COMPLICATION: ICD-10-CM

## 2022-02-25 DIAGNOSIS — M17.12 ARTHRITIS OF KNEE, LEFT: ICD-10-CM

## 2022-02-25 DIAGNOSIS — N95.1 MENOPAUSE SYNDROME: Primary | ICD-10-CM

## 2022-02-25 PROCEDURE — 99214 OFFICE O/P EST MOD 30 MIN: CPT | Performed by: FAMILY MEDICINE

## 2022-02-25 RX ORDER — LEVOTHYROXINE SODIUM 125 UG/1
125 TABLET ORAL
Qty: 90 TABLET | Refills: 1 | Status: SHIPPED | OUTPATIENT
Start: 2022-02-25 | End: 2022-03-03

## 2022-02-25 RX ORDER — TRAMADOL HYDROCHLORIDE 50 MG/1
50 TABLET ORAL
COMMUNITY
End: 2022-03-18 | Stop reason: SDUPTHER

## 2022-02-25 RX ORDER — ALBUTEROL SULFATE 90 UG/1
2 AEROSOL, METERED RESPIRATORY (INHALATION)
Qty: 18 G | Refills: 1 | Status: SHIPPED | OUTPATIENT
Start: 2022-02-25

## 2022-02-25 RX ORDER — MELOXICAM 15 MG/1
15 TABLET ORAL DAILY
Qty: 30 TABLET | Refills: 5 | Status: SHIPPED | OUTPATIENT
Start: 2022-02-25 | End: 2022-08-25

## 2022-02-25 NOTE — PROGRESS NOTES
Chief Complaint   Patient presents with    Medication Refill    Labs     3 most recent [facility] Screens 2/25/2022   Little interest or pleasure in doing things Nearly every day   Feeling down, depressed, irritable, or hopeless Nearly every day   Total Score PHQ 2 6     Abuse Screening Questionnaire 2/25/2022   Do you ever feel afraid of your partner? N   Are you in a relationship with someone who physically or mentally threatens you? N   Is it safe for you to go home? Y     Visit Vitals  /78 (BP 1 Location: Left arm, BP Patient Position: Sitting, BP Cuff Size: Small adult)   Pulse 78   Temp 98.2 °F (36.8 °C) (Oral)   Resp 14   Ht 5' 4\" (1.626 m)   Wt 166 lb 12.8 oz (75.7 kg)   SpO2 98%   BMI 28.63 kg/m²     1. \"Have you been to the ER, urgent care clinic since your last visit? Hospitalized since your last visit? \" no    2. \"Have you seen or consulted any other health care providers outside of the [address] [place] [address] since your last visit? \" no

## 2022-02-25 NOTE — PATIENT INSTRUCTIONS
Learning About Menopause  What is menopause? Menopause is the point in your life when you permanently stop having menstrual periods. After 1 year of having no periods, you've reached menopause. In most cases, menopause happens around age 48. But everyone's body has its own time line. You may stop having periods in your mid-40s. Or you might have them well into your 50s. Menopause is a natural part of growing older. You don't need treatment for it unless your symptoms bother you. But it's a good idea to learn all you can about menopause. Knowing what to expect can help you stay as healthy as possible. What happens during menopause? · It starts with perimenopause. This is the process of change that leads up to menopause. Perimenopause can start as early as your late 35s or as late as your early 46s. How long it lasts varies. But it usually lasts from 2 to 8 years. · During this time, your hormone levels will go up and down unevenly (fluctuate). This causes changes in your periods and other symptoms. In time, estrogen and progesterone levels drop enough that the menstrual cycle stops. Going a full year without having a period is usually considered menopause. · Low estrogen levels after menopause speed bone loss. This increases your risk of osteoporosis. Also, your risk of heart disease increases after menopause. · It's normal to have thinner, drier skin after menopause. The vaginal lining and the lower urinary tract also thin. This can make it hard to have sex. It can also increase the risk of vaginal and urinary tract infections. What are the symptoms? Symptoms may include:  · Hot flashes. · Trouble sleeping. · Vaginal dryness. Symptoms related to mood and thinking may also happen around the time of menopause. These include:  · Mood swings or feeling grouchy, depressed, or worried. · Problems with remembering or thinking clearly. You may have only a few mild symptoms.  Or you might have severe symptoms that disrupt your sleep and daily life. Menopause caused by surgery, chemotherapy, or radiation therapy can cause symptoms to be more severe. A condition you already had, such as depression, anxiety, sleep problems, or irritability, can also make symptoms worse. Symptoms tend to last or get worse the first year or more after menopause. Over time, hormones even out at low levels. Many symptoms improve or go away. But sometimes symptoms don't go away. After menopause, you may get other symptoms. These include drying and thinning of the skin, and vaginal and urinary tract changes. How are menopause symptoms treated? If your symptoms are bothering you, there are lifestyle changes and treatments that can help. Lifestyle changes    · Choose heart-healthy foods such as vegetables, fruits, nuts, beans, fish, and whole grains. Limit foods that have a lot of salt, fat, and sugar. Be sure you get enough calcium and vitamin D to help your bones stay strong. Low-fat or nonfat dairy products are a great source of calcium.     · Get regular exercise. It can help you manage your weight, keep your heart and bones strong, and lift your mood.     · Limit caffeine, alcohol, and stress. These things may make symptoms worse. Limiting them may help you sleep better.     · If you smoke, stop. Quitting smoking can reduce hot flashes and long-term health risks. Medicines  If your symptoms bother you, talk with your doctor. You may want to try prescription medicines, such as:    · Birth control pills before menopause.     · Hormone therapy (HT).   · Antidepressants.     · Clonidine. This medicine is usually used to treat high blood pressure. All medicines for menopause symptoms have possible risks or side effects. And there's a very small chance of serious health problems from taking hormone therapy. Be sure to talk to your doctor about your possible health risks before you start a treatment for menopause symptoms.   Other treatments  You can try:    · Cognitive-behavorial therapy. This may help reduce hot flashes.     · Hypnosis. This may help reduce the number and severity of hot flashes.     · Breathing exercises. They may help reduce hot flashes and emotional symptoms.     · Soy. Some people feel that eating lots of soy helps even out their menopause symptoms.     · Yoga or biofeedback. They may help reduce stress. Follow-up care is a key part of your treatment and safety. Be sure to make and go to all appointments, and call your doctor if you are having problems. It's also a good idea to know your test results and keep a list of the medicines you take. Where can you learn more? Go to http://www.gray.com/  Enter H199 in the search box to learn more about \"Learning About Menopause. \"  Current as of: February 11, 2021               Content Version: 13.0  © 8476-7559 [employer]wise, Incorporated. Care instructions adapted under license by [employer] (which disclaims liability or warranty for this information). If you have questions about a medical condition or this instruction, always ask your healthcare professional. [address] any warranty or liability for your use of this information.

## 2022-02-27 LAB
ALBUMIN SERPL-MCNC: 4.4 G/DL (ref 3.8–4.9)
ALBUMIN/GLOB SERPL: 1.8 {RATIO} (ref 1.2–2.2)
ALP SERPL-CCNC: 98 IU/L (ref 44–121)
ALT SERPL-CCNC: 9 IU/L (ref 0–32)
AST SERPL-CCNC: 22 IU/L (ref 0–40)
BILIRUB SERPL-MCNC: 0.3 MG/DL (ref 0–1.2)
BUN SERPL-MCNC: 12 MG/DL (ref 6–24)
BUN/CREAT SERPL: 16 (ref 9–23)
CALCIUM SERPL-MCNC: 9.9 MG/DL (ref 8.7–10.2)
CHLORIDE SERPL-SCNC: 95 MMOL/L (ref 96–106)
CO2 SERPL-SCNC: 27 MMOL/L (ref 20–29)
CREAT SERPL-MCNC: 0.74 MG/DL (ref 0.57–1)
GLOBULIN SER CALC-MCNC: 2.4 G/DL (ref 1.5–4.5)
GLUCOSE SERPL-MCNC: 98 MG/DL (ref 65–99)
POTASSIUM SERPL-SCNC: 3.9 MMOL/L (ref 3.5–5.2)
PROT SERPL-MCNC: 6.8 G/DL (ref 6–8.5)
SODIUM SERPL-SCNC: 137 MMOL/L (ref 134–144)
T4 FREE SERPL-MCNC: 1.75 NG/DL (ref 0.82–1.77)
TSH SERPL DL<=0.005 MIU/L-ACNC: 0.28 UIU/ML (ref 0.45–4.5)

## 2022-03-03 ENCOUNTER — TELEPHONE (OUTPATIENT)
Dept: FAMILY MEDICINE CLINIC | Age: 52
End: 2022-03-03

## 2022-03-03 DIAGNOSIS — E03.9 ACQUIRED HYPOTHYROIDISM: ICD-10-CM

## 2022-03-03 RX ORDER — LEVOTHYROXINE SODIUM 125 UG/1
125 TABLET ORAL
Qty: 90 TABLET | Refills: 1 | Status: SHIPPED | OUTPATIENT
Start: 2022-03-03 | End: 2022-09-19 | Stop reason: SDUPTHER

## 2022-03-04 NOTE — PROGRESS NOTES
Normal sugar, liver, and kidney function. Thyroid tests show need to decrease dose of levothyroxine. Recommend you skip taking the Synthroid one day a week (take it only 6 out of 7 days). Plan to repeat thyroid labs in 2-3 months.

## 2022-03-18 DIAGNOSIS — M17.12 ARTHRITIS OF KNEE, LEFT: ICD-10-CM

## 2022-03-18 DIAGNOSIS — M79.7 FIBROMYALGIA: Primary | ICD-10-CM

## 2022-03-18 PROBLEM — E78.00 HYPERCHOLESTEROLEMIA: Status: ACTIVE | Noted: 2017-01-25

## 2022-03-18 RX ORDER — TRAMADOL HYDROCHLORIDE 50 MG/1
50 TABLET ORAL
Qty: 90 TABLET | Refills: 2 | Status: SHIPPED | OUTPATIENT
Start: 2022-03-18 | End: 2022-04-17

## 2022-03-19 PROBLEM — E03.9 ACQUIRED HYPOTHYROIDISM: Status: ACTIVE | Noted: 2018-01-12

## 2022-03-19 PROBLEM — E66.811 OBESITY (BMI 30.0-34.9): Status: ACTIVE | Noted: 2020-07-21

## 2022-03-19 PROBLEM — E66.9 OBESITY (BMI 30.0-34.9): Status: ACTIVE | Noted: 2020-07-21

## 2022-04-04 NOTE — PROGRESS NOTES
Annual exam ages 40-58 post hysterectomy      Sonia Blount is a ,  45 y.o. female   Patient's last menstrual period was 2013. She presents for her annual checkup. She is having no significant problems. With regard to the Gardasil vaccine, she is older than the FDA approved age to receive it. Hormonal status:  She reports no perimenstrual type symptoms. She is not having vasomotor symptoms. The patient is not using any ERT. Sexual history:    She  reports being sexually active and has had partner(s) who are male. She reports using the following method of birth control/protection: Surgical.    Medical conditions:    Since her last annual GYN exam about two years ago, she has not the following changes in her health history: none. Surgical history confirmed with patient. has a past surgical history that includes hx cholecystectomy; hx orthopaedic (Left); hx laparoscopic supracervical hysterectomy (2013); hx other surgical; hx colposcopy (12); hx other surgical (); hx other surgical (); hx dilation and curettage (2012); and hx oophorectomy. Pap and Mammogram History:    Her most recent Pap smear was normal, obtained 2 year(s) ago. .    The patient had her mammogram today in our office. Breast Cancer History/Substance Abuse: negative    Osteoporosis History:    Family history does not include a first or second degree relative with osteopenia or osteoporosis. A bone density scan was not obtained.     Past Medical History:   Diagnosis Date    Abnormal Pap smear 2012    [facility] -evaluation negative    Arthritis     Asthma     Atypical squamous cells of undetermined significance (ASCUS) on Papanicolaou smear of cervix 10/08/15    HPV Negative    Autoimmune disease ([facility] [address]     sjogrens    Bartholin's gland cyst     right marsupialization    Diverticulitis     in the past- has seen Dr. Princess Diez for evaluation    Dyspareunia     Endometriosis     Fibromyalgia     GERD (gastroesophageal reflux disease)     Hiatal hernia     Hypercholesterolemia 1/25/2017    Hypertension     Hypothyroid     IBS (irritable bowel syndrome)     Lichen sclerosus     Pelvic pain     Rosacea     Sjogren's syndrome ([facility] [address]     sees Dr. 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    vulvar biopsy done-suggestive of LS     Past Surgical History:   Procedure Laterality Date    HX CHOLECYSTECTOMY      HX COLPOSCOPY  2/14/12    No dysplasia    HX DILATION AND CURETTAGE  2/14/2012    benign tissue- problems with anesthesia afterwards    HX LAPAROSCOPIC SUPRACERVICAL HYSTERECTOMY  11/25/2013    w/ RSO    HX OOPHORECTOMY      HX ORTHOPAEDIC Left     ACLX2    HX OTHER SURGICAL      Bartholins Marsupialization    HX OTHER SURGICAL  02/07    LSO--Laparotomy w/ [place] also    HX OTHER SURGICAL  11/08    vulvar biopsy       Current Outpatient Medications   Medication Sig Dispense Refill    traMADoL (ULTRAM) 50 mg tablet Take 1 Tablet by mouth every six (6) hours as needed for Pain for up to 30 days. Max Daily Amount: 200 mg. 90 Tablet 2    Synthroid 125 mcg tablet Take 1 Tablet by mouth six (6) days a week. Indications: a condition with low thyroid hormone levels 90 Tablet 1    albuterol (Ventolin HFA) 90 mcg/actuation inhaler Take 2 Puffs by inhalation every four (4) hours as needed for Wheezing. 18 g 1    meloxicam (MOBIC) 15 mg tablet Take 1 Tablet by mouth daily. Indications: joint damage causing pain and loss of function 30 Tablet 5    montelukast (SINGULAIR) 10 mg tablet TAKE 1 TABLET BY MOUTH EVERY DAY 90 Tablet 1    valACYclovir (VALTREX) 500 mg tablet TAKE 1 TABLET BY MOUTH EVERY DAY 90 Tablet 1    ergocalciferol (ERGOCALCIFEROL) 1,250 mcg (50,000 unit) capsule TAKE 1 CAP BY MOUTH EVERY SEVEN (7) DAYS.  INDICATIONS: LOW VITAMIN D LEVELS 4 Capsule 14    doxycycline (VIBRAMYCIN) 100 mg capsule TAKE 1 CAPSULE BY MOUTH TWICE A DAY 60 Capsule 5    losartan (COZAAR) 100 mg tablet Take 1 Tablet by mouth daily. Indications: high blood pressure 90 Tablet 1    hydroCHLOROthiazide (HYDRODIURIL) 25 mg tablet Take 1 Tablet by mouth daily. Indications: high blood pressure 90 Tablet 1    methocarbamoL (ROBAXIN) 750 mg tablet TAKE 1 TABLET BY MOUTH EVERY DAY EVERY NIGHT 30 Tablet 5    OTHER Amberen 2 tablets once daily for menopause (OTC)      TURMERIC PO Take  by mouth.  FLAXSEED PO Take  by mouth.  CANNABIDIOL, CBD, EXTRACT PO Take  by mouth.  acetaminophen (TYLENOL EXTRA STRENGTH) 500 mg tablet Take 1,000 mg by mouth every six (6) hours as needed for Pain.  multivitamin (ONE A DAY) tablet Take 1 Tab by mouth daily.  omeprazole (PRILOSEC) 20 mg capsule Take 20 mg by mouth daily. Allergies: Monsel's [ferric subsulfate]; Other plant, animal, environmental; and Premarin [conjugated estrogens]     Tobacco History:  reports that she has never smoked. She has never used smokeless tobacco.  Alcohol Abuse:  reports current alcohol use. Drug Abuse:  reports no history of drug use.     Family Medical/Cancer History:   Family History   Problem Relation Age of Onset    Heart Disease Mother         pacemaker, defibrillator    Osteoporosis Mother     Diabetes Brother     Hypertension Father         Review of Systems - History obtained from the patient  Constitutional: negative for weight loss, fever, night sweats  HEENT: negative for hearing loss, earache, congestion, snoring, sorethroat  CV: negative for chest pain, palpitations, edema  Resp: negative for cough, shortness of breath, wheezing  GI: negative for change in bowel habits, abdominal pain, black or bloody stools  : negative for frequency, dysuria, hematuria, vaginal discharge  MSK: negative for back pain, joint pain, muscle pain  Breast: negative for breast lumps, nipple discharge, galactorrhea  Skin :negative for itching, rash, hives  Neuro: negative for dizziness, headache, confusion, weakness  Psych: negative for anxiety, depression, change in mood  Heme/lymph: negative for bleeding, bruising, pallor    Physical Exam    Visit Vitals  LMP 11/05/2013     Constitutional  · Appearance: well-nourished, well developed, alert, in no acute distress    HENT  · Head and Face: appears normal    Neck  · Inspection/Palpation: normal appearance, no masses or tenderness  · Lymph Nodes: no lymphadenopathy present  · Thyroid: gland size normal, nontender, no nodules or masses present on palpation    Chest  · Respiratory Effort: breathing unlabored  · Auscultation: normal breath sounds    Cardiovascular  · Heart:  · Auscultation: regular rate and rhythm without murmur    Breasts  · Inspection of Breasts: breasts symmetrical, no skin changes, no discharge present, nipple appearance normal, no skin retraction present  · Palpation of Breasts and Axillae: no masses present on palpation, no breast tenderness  · Axillary Lymph Nodes: no lymphadenopathy present    Gastrointestinal  · Abdominal Examination: abdomen non-tender to palpation, normal bowel sounds, no masses present  · Liver and spleen: no hepatomegaly present, spleen not palpable  · Hernias: no hernias identified    Genitourinary  · External Genitalia: normal appearance for age, no discharge present, no tenderness present, no inflammatory lesions present, no masses present, no atrophy present  · Vagina: normal vaginal vault without central or paravaginal defects, no discharge present, no inflammatory lesions present, no masses present  · Bladder: non-tender to palpation  · Urethra: appears normal  · Cervix: absent  · Uterus: absent  · Adnexa: no adnexal tenderness present, no adnexal masses present  · Perineum: perineum within normal limits, no evidence of trauma, no rashes or skin lesions present  · Anus: anus within normal limits, no hemorrhoids present  · Inguinal Lymph Nodes: no lymphadenopathy present    Skin  · General Inspection: no rash, no lesions identified    Neurologic/Psychiatric  · Mental Status:  · Orientation: grossly oriented to person, place and time  · Mood and Affect: mood normal, affect appropriate    Assessment:  Routine gynecologic examination  Her current medical status is satisfactory with no evidence of significant gynecologic issues.   Will use vaginal estrogen tablet daily for ERT    Plan:  Counseled re: diet, exercise, healthy lifestyle  Return for yearly wellness visits  Rec annual mammogram

## 2022-04-05 ENCOUNTER — OFFICE VISIT (OUTPATIENT)
Dept: OBGYN CLINIC | Age: 52
End: 2022-04-05

## 2022-04-05 VITALS — DIASTOLIC BLOOD PRESSURE: 78 MMHG | WEIGHT: 168 LBS | BODY MASS INDEX: 28.84 KG/M2 | SYSTOLIC BLOOD PRESSURE: 110 MMHG

## 2022-04-05 DIAGNOSIS — N95.2 VAGINAL ATROPHY: ICD-10-CM

## 2022-04-05 DIAGNOSIS — Z01.419 ENCOUNTER FOR GYNECOLOGICAL EXAMINATION WITHOUT ABNORMAL FINDING: Primary | ICD-10-CM

## 2022-04-05 PROCEDURE — 99396 PREV VISIT EST AGE 40-64: CPT | Performed by: OBSTETRICS & GYNECOLOGY

## 2022-04-05 RX ORDER — ESTRADIOL 2 MG/1
2 TABLET ORAL DAILY
Qty: 90 TABLET | Refills: 4 | Status: SHIPPED | OUTPATIENT
Start: 2022-04-05

## 2022-05-17 DIAGNOSIS — M79.7 FIBROMYALGIA: ICD-10-CM

## 2022-05-17 RX ORDER — METHOCARBAMOL 750 MG/1
TABLET, FILM COATED ORAL
Qty: 30 TABLET | Refills: 5 | Status: SHIPPED | OUTPATIENT
Start: 2022-05-17 | End: 2022-09-16 | Stop reason: SDUPTHER

## 2022-05-30 DIAGNOSIS — J45.20 MILD INTERMITTENT ASTHMA WITHOUT COMPLICATION: ICD-10-CM

## 2022-05-30 DIAGNOSIS — B00.9 HERPES SIMPLEX INFECTION OF SKIN: ICD-10-CM

## 2022-05-30 RX ORDER — VALACYCLOVIR HYDROCHLORIDE 500 MG/1
TABLET, FILM COATED ORAL
Qty: 90 TABLET | Refills: 1 | Status: SHIPPED | OUTPATIENT
Start: 2022-05-30 | End: 2022-09-16 | Stop reason: SDUPTHER

## 2022-05-30 RX ORDER — MONTELUKAST SODIUM 10 MG/1
TABLET ORAL
Qty: 90 TABLET | Refills: 1 | Status: SHIPPED | OUTPATIENT
Start: 2022-05-30 | End: 2022-09-16 | Stop reason: SDUPTHER

## 2022-07-16 DIAGNOSIS — I10 ESSENTIAL HYPERTENSION, BENIGN: ICD-10-CM

## 2022-07-16 RX ORDER — LOSARTAN POTASSIUM 100 MG/1
TABLET ORAL
Qty: 90 TABLET | Refills: 1 | Status: SHIPPED | OUTPATIENT
Start: 2022-07-16

## 2022-07-16 RX ORDER — HYDROCHLOROTHIAZIDE 25 MG/1
TABLET ORAL
Qty: 90 TABLET | Refills: 1 | Status: SHIPPED | OUTPATIENT
Start: 2022-07-16

## 2022-08-25 DIAGNOSIS — L71.9 ACNE ROSACEA: ICD-10-CM

## 2022-08-25 DIAGNOSIS — M17.12 ARTHRITIS OF KNEE, LEFT: ICD-10-CM

## 2022-08-25 RX ORDER — MELOXICAM 15 MG/1
15 TABLET ORAL DAILY
Qty: 30 TABLET | Refills: 5 | Status: SHIPPED | OUTPATIENT
Start: 2022-08-25 | End: 2022-09-16 | Stop reason: ALTCHOICE

## 2022-08-25 RX ORDER — DOXYCYCLINE 100 MG/1
CAPSULE ORAL
Qty: 60 CAPSULE | Refills: 5 | Status: SHIPPED | OUTPATIENT
Start: 2022-08-25

## 2022-09-16 ENCOUNTER — OFFICE VISIT (OUTPATIENT)
Dept: FAMILY MEDICINE CLINIC | Age: 52
End: 2022-09-16
Payer: COMMERCIAL

## 2022-09-16 ENCOUNTER — LAB ONLY (OUTPATIENT)
Dept: FAMILY MEDICINE CLINIC | Age: 52
End: 2022-09-16

## 2022-09-16 VITALS
HEIGHT: 64 IN | BODY MASS INDEX: 28 KG/M2 | OXYGEN SATURATION: 98 % | SYSTOLIC BLOOD PRESSURE: 110 MMHG | DIASTOLIC BLOOD PRESSURE: 70 MMHG | WEIGHT: 164 LBS | HEART RATE: 77 BPM | RESPIRATION RATE: 20 BRPM | TEMPERATURE: 99.2 F

## 2022-09-16 DIAGNOSIS — Z79.899 ENCOUNTER FOR LONG-TERM CURRENT USE OF MEDICATION: ICD-10-CM

## 2022-09-16 DIAGNOSIS — E55.9 VITAMIN D DEFICIENCY: ICD-10-CM

## 2022-09-16 DIAGNOSIS — E03.9 ACQUIRED HYPOTHYROIDISM: ICD-10-CM

## 2022-09-16 DIAGNOSIS — J45.20 MILD INTERMITTENT ASTHMA WITHOUT COMPLICATION: ICD-10-CM

## 2022-09-16 DIAGNOSIS — I10 ESSENTIAL HYPERTENSION, BENIGN: ICD-10-CM

## 2022-09-16 DIAGNOSIS — M79.7 FIBROMYALGIA: ICD-10-CM

## 2022-09-16 DIAGNOSIS — B00.9 HERPES SIMPLEX INFECTION OF SKIN: ICD-10-CM

## 2022-09-16 DIAGNOSIS — I10 ESSENTIAL HYPERTENSION, BENIGN: Primary | ICD-10-CM

## 2022-09-16 PROCEDURE — 99214 OFFICE O/P EST MOD 30 MIN: CPT | Performed by: FAMILY MEDICINE

## 2022-09-16 RX ORDER — METHOCARBAMOL 750 MG/1
TABLET, FILM COATED ORAL
Qty: 30 TABLET | Refills: 5 | Status: SHIPPED | OUTPATIENT
Start: 2022-09-16

## 2022-09-16 RX ORDER — TRAMADOL HYDROCHLORIDE 50 MG/1
50 TABLET ORAL
Qty: 90 TABLET | Refills: 2 | Status: SHIPPED | OUTPATIENT
Start: 2022-09-16 | End: 2022-10-16

## 2022-09-16 RX ORDER — MONTELUKAST SODIUM 10 MG/1
10 TABLET ORAL DAILY
Qty: 90 TABLET | Refills: 1 | Status: SHIPPED | OUTPATIENT
Start: 2022-09-16

## 2022-09-16 RX ORDER — VALACYCLOVIR HYDROCHLORIDE 500 MG/1
500 TABLET, FILM COATED ORAL DAILY
Qty: 90 TABLET | Refills: 1 | Status: SHIPPED | OUTPATIENT
Start: 2022-09-16

## 2022-09-16 RX ORDER — TRAMADOL HYDROCHLORIDE 50 MG/1
50 TABLET ORAL
Qty: 90 TABLET | Refills: 2 | Status: CANCELLED | OUTPATIENT
Start: 2022-09-16 | End: 2022-10-16

## 2022-09-16 RX ORDER — TRAMADOL HYDROCHLORIDE 50 MG/1
TABLET ORAL
COMMUNITY
Start: 2022-07-07 | End: 2022-09-16 | Stop reason: SDUPTHER

## 2022-09-16 NOTE — PROGRESS NOTES
Identified pt with two pt identifiers(name and ). Chief Complaint   Patient presents with    Labs     Not fasting    Medication Refill        Health Maintenance Due   Topic    Pneumococcal 0-64 years (1 - PCV)    COVID-19 Vaccine (4 - Booster for Pfizer series)    Colorectal Cancer Screening Combo     Flu Vaccine (1)       Wt Readings from Last 3 Encounters:   22 164 lb (74.4 kg)   22 168 lb (76.2 kg)   22 166 lb 12.8 oz (75.7 kg)     Temp Readings from Last 3 Encounters:   22 99.2 °F (37.3 °C) (Temporal)   22 98.2 °F (36.8 °C) (Oral)   21 98 °F (36.7 °C) (Temporal)     BP Readings from Last 3 Encounters:   22 110/70   22 110/78   22 132/78     Pulse Readings from Last 3 Encounters:   22 77   22 78   21 65         Learning Assessment:  :     Learning Assessment 2014   PRIMARY LEARNER Patient Patient Patient Patient Patient   HIGHEST LEVEL OF EDUCATION - PRIMARY LEARNER  - - - - SOME COLLEGE   BARRIERS PRIMARY LEARNER - - - - [address] CAREGIVER No No - - No   PRIMARY LANGUAGE ENGLISH ENGLISH ENGLISH ENGLISH ENGLISH   LEARNER PREFERENCE PRIMARY DEMONSTRATION DEMONSTRATION DEMONSTRATION DEMONSTRATION DEMONSTRATION     - - - - OTHER (COMMENT)   ANSWERED BY patient patient  patient patient self   RELATIONSHIP SELF SELF SELF SELF SELF       Depression Screening:  :     3 most recent PHQ Screens 2022   Little interest or pleasure in doing things Not at all   Feeling down, depressed, irritable, or hopeless Not at all   Total Score PHQ 2 0       Fall Risk Assessment:  :     Fall Risk Assessment, last 12 mths 2018   Able to walk? Yes   Fall in past 12 months? No       Abuse Screening:  :     Abuse Screening Questionnaire 2022 2022 2021 2021 2020 3/11/2019 2018   Do you ever feel afraid of your partner?  N N N N N N N   Are you in a relationship with someone who physically or mentally threatens you? N N N N N N N   Is it safe for you to go home? Y Y Y Y Y Y Y       Coordination of Care Questionnaire:  :     1) Have you been to an emergency room, urgent care clinic since your last visit? no   Hospitalized since your last visit? no             2) Have you seen or consulted any other health care providers outside of [address] [place] [address] since your last visit? no  (Include any pap smears or colon screenings in this section.)    3) Do you have an Advance Directive on file? no  Are you interested in receiving information about Advance Directives? no    Patient is accompanied by N/A I have received verbal consent from Odin Henry to discuss any/all medical information while they are present in the room. 4.  For patients aged 39-70: Has the patient had a colonoscopy / FIT/ Cologuard? No      If the patient is female:    5. For patients aged 41-77: Has the patient had a mammogram within the past 2 years? Yes - no Care Gap present      6. For patients aged 21-65: Has the patient had a pap smear?  Yes - no Care Gap present

## 2022-09-17 LAB
25(OH)D3 SERPL-MCNC: 40.5 NG/ML (ref 30–100)
ALBUMIN SERPL-MCNC: 3.6 G/DL (ref 3.5–5)
ALBUMIN/GLOB SERPL: 1 {RATIO} (ref 1.1–2.2)
ALP SERPL-CCNC: 94 U/L (ref 45–117)
ALT SERPL-CCNC: 10 U/L (ref 12–78)
ANION GAP SERPL CALC-SCNC: 6 MMOL/L (ref 5–15)
AST SERPL-CCNC: 17 U/L (ref 15–37)
BASOPHILS # BLD: 0 K/UL (ref 0–0.1)
BASOPHILS NFR BLD: 1 % (ref 0–1)
BILIRUB SERPL-MCNC: 0.4 MG/DL (ref 0.2–1)
BUN SERPL-MCNC: 11 MG/DL (ref 6–20)
BUN/CREAT SERPL: 17 (ref 12–20)
CALCIUM SERPL-MCNC: 9.2 MG/DL (ref 8.5–10.1)
CHLORIDE SERPL-SCNC: 99 MMOL/L (ref 97–108)
CHOLEST SERPL-MCNC: 235 MG/DL
CO2 SERPL-SCNC: 34 MMOL/L (ref 21–32)
CREAT SERPL-MCNC: 0.65 MG/DL (ref 0.55–1.02)
DIFFERENTIAL METHOD BLD: NORMAL
EOSINOPHIL # BLD: 0.1 K/UL (ref 0–0.4)
EOSINOPHIL NFR BLD: 1 % (ref 0–7)
ERYTHROCYTE [DISTWIDTH] IN BLOOD BY AUTOMATED COUNT: 13.4 % (ref 11.5–14.5)
GLOBULIN SER CALC-MCNC: 3.5 G/DL (ref 2–4)
GLUCOSE SERPL-MCNC: 87 MG/DL (ref 65–100)
HCT VFR BLD AUTO: 41.7 % (ref 35–47)
HDLC SERPL-MCNC: 112 MG/DL
HDLC SERPL: 2.1 {RATIO} (ref 0–5)
HGB BLD-MCNC: 13.6 G/DL (ref 11.5–16)
IMM GRANULOCYTES # BLD AUTO: 0 K/UL (ref 0–0.04)
IMM GRANULOCYTES NFR BLD AUTO: 0 % (ref 0–0.5)
LDLC SERPL CALC-MCNC: 100.6 MG/DL (ref 0–100)
LYMPHOCYTES # BLD: 2 K/UL (ref 0.8–3.5)
LYMPHOCYTES NFR BLD: 27 % (ref 12–49)
MCH RBC QN AUTO: 30.2 PG (ref 26–34)
MCHC RBC AUTO-ENTMCNC: 32.6 G/DL (ref 30–36.5)
MCV RBC AUTO: 92.5 FL (ref 80–99)
MONOCYTES # BLD: 0.6 K/UL (ref 0–1)
MONOCYTES NFR BLD: 8 % (ref 5–13)
NEUTS SEG # BLD: 4.6 K/UL (ref 1.8–8)
NEUTS SEG NFR BLD: 63 % (ref 32–75)
NRBC # BLD: 0 K/UL (ref 0–0.01)
NRBC BLD-RTO: 0 PER 100 WBC
PLATELET # BLD AUTO: 305 K/UL (ref 150–400)
PMV BLD AUTO: 10.1 FL (ref 8.9–12.9)
POTASSIUM SERPL-SCNC: 3.4 MMOL/L (ref 3.5–5.1)
PROT SERPL-MCNC: 7.1 G/DL (ref 6.4–8.2)
RBC # BLD AUTO: 4.51 M/UL (ref 3.8–5.2)
SODIUM SERPL-SCNC: 139 MMOL/L (ref 136–145)
T4 FREE SERPL-MCNC: 1.3 NG/DL (ref 0.8–1.5)
TRIGL SERPL-MCNC: 112 MG/DL (ref ?–150)
TSH SERPL DL<=0.05 MIU/L-ACNC: 0.81 UIU/ML (ref 0.36–3.74)
VLDLC SERPL CALC-MCNC: 22.4 MG/DL
WBC # BLD AUTO: 7.4 K/UL (ref 3.6–11)

## 2022-09-18 NOTE — PROGRESS NOTES
Subjective:     Tessa Roque is a 45 y.o. female who presents for follow up of hypertension, hyperlipidemia, and obesity. Diet and Lifestyle: generally follows a low fat low cholesterol diet, generally follows a low sodium diet, nonsmoker  Home BP Monitoring: is well controlled at home. Cardiovascular ROS: side effects noted by patient include fatigue, no TIA's, no chest pain on exertion, no dyspnea on exertion, no swelling of ankles. New concerns: she cut in half dose of Losartan due to fatigue and low bP readings. She still takes HCTZ due to fluid retention in legs. Due for labs. Patient Active Problem List    Diagnosis Date Noted    Arthritis of knee, left 02/25/2022    Obesity (BMI 30.0-34.9) 07/21/2020    Acquired hypothyroidism 01/12/2018    Hypercholesterolemia 01/25/2017    Essential hypertension, benign 01/25/2013    Fibromyalgia 11/20/2012    Cervical spondylosis 07/20/2012    Acne rosacea 02/21/2012    Esophageal reflux 02/21/2012    Mild intermittent asthma without complication 29/11/5839     Current Outpatient Medications   Medication Sig Dispense Refill    methocarbamoL (ROBAXIN) 750 mg tablet TAKE 1 TABLET BY MOUTH EVERY DAY EVERY NIGHT 30 Tablet 5    montelukast (SINGULAIR) 10 mg tablet Take 1 Tablet by mouth daily. 90 Tablet 1    traMADoL (ULTRAM) 50 mg tablet Take 1 Tablet by mouth every eight (8) hours as needed for Pain for up to 30 days. Max Daily Amount: 150 mg. 90 Tablet 2    valACYclovir (VALTREX) 500 mg tablet Take 1 Tablet by mouth daily. Indications: fever blisters 90 Tablet 1    doxycycline (VIBRAMYCIN) 100 mg capsule TAKE 1 CAPSULE BY MOUTH TWICE A DAY 60 Capsule 5    losartan (COZAAR) 100 mg tablet TAKE 1 TABLET BY MOUTH EVERY DAY FOR BLOOD PRESSURE (Patient taking differently: 50 mg.) 90 Tablet 1    hydroCHLOROthiazide (HYDRODIURIL) 25 mg tablet TAKE 1 TABLET BY MOUTH EVERY DAY FOR BLOOD PRESSURE 90 Tablet 1    estradioL (ESTRACE) 2 mg tablet Take 1 Tablet by mouth daily. 90 Tablet 4    Synthroid 125 mcg tablet Take 1 Tablet by mouth six (6) days a week. Indications: a condition with low thyroid hormone levels 90 Tablet 1    albuterol (Ventolin HFA) 90 mcg/actuation inhaler Take 2 Puffs by inhalation every four (4) hours as needed for Wheezing. 18 g 1    ergocalciferol (ERGOCALCIFEROL) 1,250 mcg (50,000 unit) capsule TAKE 1 CAP BY MOUTH EVERY SEVEN (7) DAYS. INDICATIONS: LOW VITAMIN D LEVELS 4 Capsule 14    OTHER Amberen 2 tablets once daily for menopause (OTC)      TURMERIC PO Take  by mouth. FLAXSEED PO Take  by mouth. CANNABIDIOL, CBD, EXTRACT PO Take  by mouth. acetaminophen (TYLENOL) 500 mg tablet Take 1,000 mg by mouth every six (6) hours as needed for Pain.      multivitamin (ONE A DAY) tablet Take 1 Tab by mouth daily. omeprazole (PRILOSEC) 20 mg capsule Take 20 mg by mouth daily.          Allergies   Allergen Reactions    Monsel's [Ferric Subsulfate] Swelling     Extreme burning, skin sloughing    Other Plant, Animal, Environmental Unknown (comments)     Vinegar    Premarin [Conjugated Estrogens] Itching     Past Medical History:   Diagnosis Date    Abnormal Pap smear 1/2/2012    [facility]MI -evaluation negative    Arthritis     Asthma     Atypical squamous cells of undetermined significance (ASCUS) on Papanicolaou smear of cervix 10/08/15    HPV Negative    Autoimmune disease ([facility] [address]     sjogrens    Bartholin's gland cyst     right marsupialization    Diverticulitis     in the past- has seen Dr. Marni Valdez for evaluation    Dyspareunia     Endometriosis     Fibromyalgia     GERD (gastroesophageal reflux disease)     Hiatal hernia     Hypercholesterolemia 1/25/2017    Hypertension     Hypothyroid     IBS (irritable bowel syndrome)     Lichen sclerosus     Pelvic pain     Rosacea     Sjogren's syndrome ([address]     sees Dr. 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 Objective:     Visit Vitals  /70 (BP 1 Location: Left arm, BP Patient Position: Sitting, BP Cuff Size: Adult)   Pulse 77   Temp 99.2 °F (37.3 °C) (Temporal)   Resp 20   Ht 5' 4\" (1.626 m)   Wt 164 lb (74.4 kg)   LMP 11/05/2013   SpO2 98%   BMI 28.15 kg/m²     Appearance: alert, well appearing, and in no distress and normal appearing weight. General exam: CVS exam BP noted to be well controlled today in office, S1, S2 normal, no gallop, no murmur, chest clear, no JVD, no HSM, no edema. Lab review: orders written for new lab studies as appropriate; see orders. Assessment/Plan:     . Moises Ra ICD-10-CM ICD-9-CM    1. Essential hypertension, benign  I10 401.1 LIPID PANEL      2. Acquired hypothyroidism  E03.9 244.9 TSH 3RD GENERATION      T4, FREE      3. Fibromyalgia  M79.7 729.1 methocarbamoL (ROBAXIN) 750 mg tablet      traMADoL (ULTRAM) 50 mg tablet      4. Mild intermittent asthma without complication  V30.76 725.77 montelukast (SINGULAIR) 10 mg tablet      5. Herpes simplex infection of skin  B00.9 054.9 valACYclovir (VALTREX) 500 mg tablet      6. Encounter for long-term current use of medication  Z79.899 V58.69 CBC WITH AUTOMATED DIFF      METABOLIC PANEL, COMPREHENSIVE      7.  Vitamin D deficiency  E55.9 268.9 VITAMIN D, 25 HYDROXY

## 2022-09-19 ENCOUNTER — TELEPHONE (OUTPATIENT)
Dept: FAMILY MEDICINE CLINIC | Age: 52
End: 2022-09-19

## 2022-09-19 DIAGNOSIS — E03.9 ACQUIRED HYPOTHYROIDISM: ICD-10-CM

## 2022-09-19 RX ORDER — LEVOTHYROXINE SODIUM 125 UG/1
125 TABLET ORAL
Qty: 90 TABLET | Refills: 1 | Status: SHIPPED | OUTPATIENT
Start: 2022-09-19

## 2022-09-20 ENCOUNTER — TELEPHONE (OUTPATIENT)
Dept: FAMILY MEDICINE CLINIC | Age: 52
End: 2022-09-20

## 2022-09-20 NOTE — PROGRESS NOTES
Call pt. She said to leave message on machine. Good Vit D level. Normal blood cell counts. Borderline low potassium. Normal sugar, kidney, and liver function. Cholesterol numbers are fine, with high HDL (good) cholesterol. Good thyroid level on current dose.

## 2022-09-20 NOTE — TELEPHONE ENCOUNTER
----- Message from Tommy Ace MD sent at 9/19/2022  8:46 PM EDT -----  Call pt. She said to leave message on machine. Good Vit D level. Normal blood cell counts. Borderline low potassium. Normal sugar, kidney, and liver function. Cholesterol numbers are fine, with high HDL (good) cholesterol. Good thyroid level on current dose.

## 2022-09-20 NOTE — TELEPHONE ENCOUNTER
Called and spoke with patients . I told him she wanted results left on answering machine, so we hung up, called back and left on patients VM so that they would have them.

## 2022-10-06 NOTE — PROGRESS NOTES
Good lab results! Normal blood counts, sugar, liver, kidney and thyroid tests.  Vit D level is at goal. 600

## 2023-01-15 DIAGNOSIS — I10 ESSENTIAL HYPERTENSION, BENIGN: ICD-10-CM

## 2023-01-15 RX ORDER — HYDROCHLOROTHIAZIDE 25 MG/1
TABLET ORAL
Qty: 90 TABLET | Refills: 1 | Status: SHIPPED | OUTPATIENT
Start: 2023-01-15

## 2023-01-15 RX ORDER — LOSARTAN POTASSIUM 100 MG/1
TABLET ORAL
Qty: 90 TABLET | Refills: 1 | Status: SHIPPED | OUTPATIENT
Start: 2023-01-15

## 2023-01-31 NOTE — PROGRESS NOTES
Identified pt with two pt identifiers(name and ). Chief Complaint Patient presents with  Medication Evaluation Evaluation for medication refill of Tramadol Health Maintenance Due Topic  Pneumococcal 19-64 Medium Risk (1 of 1 - PPSV23)  Shingrix Vaccine Age 50> (1 of 2)  FOBT Q 1 YEAR AGE 50-75  Influenza Age 5 to Adult Wt Readings from Last 3 Encounters:  
10/02/18 210 lb 6.4 oz (95.4 kg) 18 207 lb 3.2 oz (94 kg) 18 212 lb 9.6 oz (96.4 kg) Temp Readings from Last 3 Encounters:  
18 97.9 °F (36.6 °C) (Oral) 18 97.8 °F (36.6 °C) (Oral) 05/10/17 98.4 °F (36.9 °C) (Oral) BP Readings from Last 3 Encounters:  
10/02/18 124/70  
18 120/78  
18 114/74 Pulse Readings from Last 3 Encounters:  
18 68  
18 90  
05/10/17 90 Learning Assessment: 
:  
 
Learning Assessment 2014 PRIMARY LEARNER Patient Patient Patient HIGHEST LEVEL OF EDUCATION - PRIMARY LEARNER  - - SOME COLLEGE  
BARRIERS PRIMARY LEARNER - - NONE  
CO-LEARNER CAREGIVER - - No  
PRIMARY LANGUAGE ENGLISH ENGLISH ENGLISH  
LEARNER PREFERENCE PRIMARY DEMONSTRATION DEMONSTRATION DEMONSTRATION  
  - - OTHER (COMMENT) ANSWERED BY patient patient self RELATIONSHIP SELF SELF SELF Depression Screening: 
:  
 
PHQ over the last two weeks 2019 Little interest or pleasure in doing things Not at all Feeling down, depressed, irritable, or hopeless Not at all Total Score PHQ 2 0 Fall Risk Assessment: 
:  
 
Fall Risk Assessment, last 12 mths 2018 Able to walk? Yes Fall in past 12 months? No  
 
 
Abuse Screening: 
:  
 
Abuse Screening Questionnaire 2018 Do you ever feel afraid of your partner? N N N Are you in a relationship with someone who physically or mentally threatens you? N N N Is it safe for you to go home? Maryse Kwok Coordination of Care Questionnaire: :  
 
1) Have you been to an emergency room, urgent care clinic since your last visit? no  
Hospitalized since your last visit? no          
 
2) Have you seen or consulted any other health care providers outside of [address] [place] [address] since your last visit? yes GYN (Include any pap smears or colon screenings in this section.) 3) Do you have an Advance Directive on file? no 
Are you interested in receiving information about Advance Directives? no 
 
Reviewed record in preparation for visit and have obtained necessary documentation. Medication reconciliation up to date and corrected with patient at this time. Doxepin Counseling:  Patient advised that the medication is sedating and not to drive a car after taking this medication. Patient informed of potential adverse effects including but not limited to dry mouth, urinary retention, and blurry vision.  The patient verbalized understanding of the proper use and possible adverse effects of doxepin.  All of the patient's questions and concerns were addressed.

## 2023-02-27 ENCOUNTER — OFFICE VISIT (OUTPATIENT)
Dept: FAMILY MEDICINE CLINIC | Age: 53
End: 2023-02-27
Payer: COMMERCIAL

## 2023-02-27 ENCOUNTER — TELEPHONE (OUTPATIENT)
Dept: FAMILY MEDICINE CLINIC | Age: 53
End: 2023-02-27

## 2023-02-27 ENCOUNTER — LAB ONLY (OUTPATIENT)
Dept: FAMILY MEDICINE CLINIC | Age: 53
End: 2023-02-27

## 2023-02-27 VITALS
WEIGHT: 169.8 LBS | RESPIRATION RATE: 20 BRPM | SYSTOLIC BLOOD PRESSURE: 108 MMHG | DIASTOLIC BLOOD PRESSURE: 70 MMHG | OXYGEN SATURATION: 98 % | HEIGHT: 64 IN | HEART RATE: 79 BPM | BODY MASS INDEX: 28.99 KG/M2 | TEMPERATURE: 97.7 F

## 2023-02-27 DIAGNOSIS — E55.9 VITAMIN D DEFICIENCY: ICD-10-CM

## 2023-02-27 DIAGNOSIS — E03.9 ACQUIRED HYPOTHYROIDISM: ICD-10-CM

## 2023-02-27 DIAGNOSIS — I10 ESSENTIAL HYPERTENSION, BENIGN: Primary | ICD-10-CM

## 2023-02-27 DIAGNOSIS — M79.7 FIBROMYALGIA: ICD-10-CM

## 2023-02-27 DIAGNOSIS — M25.511 CHRONIC PAIN OF BOTH SHOULDERS: ICD-10-CM

## 2023-02-27 DIAGNOSIS — Z79.899 ENCOUNTER FOR LONG-TERM CURRENT USE OF MEDICATION: ICD-10-CM

## 2023-02-27 DIAGNOSIS — G89.29 CHRONIC PAIN OF BOTH SHOULDERS: ICD-10-CM

## 2023-02-27 DIAGNOSIS — Z12.11 SCREEN FOR COLON CANCER: ICD-10-CM

## 2023-02-27 DIAGNOSIS — M25.512 CHRONIC PAIN OF BOTH SHOULDERS: ICD-10-CM

## 2023-02-27 DIAGNOSIS — J45.20 MILD INTERMITTENT ASTHMA WITHOUT COMPLICATION: ICD-10-CM

## 2023-02-27 DIAGNOSIS — B00.9 HERPES SIMPLEX INFECTION OF SKIN: ICD-10-CM

## 2023-02-27 PROCEDURE — 99214 OFFICE O/P EST MOD 30 MIN: CPT | Performed by: FAMILY MEDICINE

## 2023-02-27 PROCEDURE — 3074F SYST BP LT 130 MM HG: CPT | Performed by: FAMILY MEDICINE

## 2023-02-27 PROCEDURE — 3078F DIAST BP <80 MM HG: CPT | Performed by: FAMILY MEDICINE

## 2023-02-27 RX ORDER — MONTELUKAST SODIUM 10 MG/1
10 TABLET ORAL DAILY
Qty: 90 TABLET | Refills: 3 | Status: SHIPPED | OUTPATIENT
Start: 2023-02-27

## 2023-02-27 RX ORDER — ERGOCALCIFEROL 1.25 MG/1
50000 CAPSULE ORAL
Qty: 4 CAPSULE | Refills: 14 | Status: SHIPPED | OUTPATIENT
Start: 2023-02-27

## 2023-02-27 RX ORDER — TRAMADOL HYDROCHLORIDE 50 MG/1
50 TABLET ORAL
Qty: 90 TABLET | Refills: 2 | Status: SHIPPED | OUTPATIENT
Start: 2023-02-27 | End: 2023-03-29

## 2023-02-27 RX ORDER — MELOXICAM 15 MG/1
TABLET ORAL
COMMUNITY
Start: 2023-02-09 | End: 2023-02-27

## 2023-02-27 RX ORDER — METHOCARBAMOL 750 MG/1
TABLET, FILM COATED ORAL
Qty: 30 TABLET | Refills: 5 | Status: SHIPPED | OUTPATIENT
Start: 2023-02-27

## 2023-02-27 RX ORDER — VALACYCLOVIR HYDROCHLORIDE 500 MG/1
500 TABLET, FILM COATED ORAL DAILY
Qty: 90 TABLET | Refills: 1 | Status: SHIPPED | OUTPATIENT
Start: 2023-02-27

## 2023-02-27 RX ORDER — TRAMADOL HYDROCHLORIDE 50 MG/1
TABLET ORAL
COMMUNITY
Start: 2022-12-12 | End: 2023-02-27 | Stop reason: SDUPTHER

## 2023-02-27 NOTE — PROGRESS NOTES
Identified pt with two pt identifiers(name and ). Chief Complaint   Patient presents with    Medication Refill     Patient is here for medication refills         Health Maintenance Due   Topic    Pneumococcal 0-64 years (1 - PCV)    Colorectal Cancer Screening Combo     COVID-19 Vaccine (5 - Booster for Pfizer series)       Wt Readings from Last 3 Encounters:   22 164 lb (74.4 kg)   22 168 lb (76.2 kg)   22 166 lb 12.8 oz (75.7 kg)     Temp Readings from Last 3 Encounters:   22 99.2 °F (37.3 °C) (Temporal)   22 98.2 °F (36.8 °C) (Oral)   21 98 °F (36.7 °C) (Temporal)     BP Readings from Last 3 Encounters:   22 110/70   22 110/78   22 132/78     Pulse Readings from Last 3 Encounters:   22 77   22 78   21 65         Learning Assessment:  :     Learning Assessment 2014   PRIMARY LEARNER Patient Patient Patient Patient Patient   HIGHEST LEVEL OF EDUCATION - PRIMARY LEARNER  - - - - SOME COLLEGE   BARRIERS PRIMARY LEARNER - - - - [address] CAREGIVER No No - - No   PRIMARY LANGUAGE ENGLISH ENGLISH ENGLISH ENGLISH ENGLISH   LEARNER PREFERENCE PRIMARY DEMONSTRATION DEMONSTRATION DEMONSTRATION DEMONSTRATION DEMONSTRATION     - - - - OTHER (COMMENT)   ANSWERED BY patient patient  patient patient self   RELATIONSHIP SELF SELF SELF SELF SELF       Depression Screening:  :     3 most recent PHQ Screens 2022   Little interest or pleasure in doing things Not at all   Feeling down, depressed, irritable, or hopeless Not at all   Total Score PHQ 2 0       Fall Risk Assessment:  :     Fall Risk Assessment, last 12 mths 2018   Able to walk? Yes   Fall in past 12 months? No       Abuse Screening:  :     Abuse Screening Questionnaire 2022 2022 2021 2021 2020 3/11/2019 2018   Do you ever feel afraid of your partner?  N N N N N N N   Are you in a relationship with someone who physically or mentally threatens you? N N N N N N N   Is it safe for you to go home? Y Y Y Y Y Y Y       Coordination of Care Questionnaire:  :     1) Have you been to an emergency room, urgent care clinic since your last visit? no   Hospitalized since your last visit? no             2) Have you seen or consulted any other health care providers outside of [address] [place] [address] since your last visit? no  (Include any pap smears or colon screenings in this section.)    3) Do you have an Advance Directive on file? no  Are you interested in receiving information about Advance Directives? no    Patient is accompanied by self I have received verbal consent from Rosangela Moseley to discuss any/all medical information while they are present in the room. 4.  For patients aged 39-70: Has the patient had a colonoscopy / FIT/ Cologuard? No      If the patient is female:    5. For patients aged 41-77: Has the patient had a mammogram within the past 2 years? Yes - no Care Gap present      6. For patients aged 21-65: Has the patient had a pap smear?  Yes - no Care Gap present

## 2023-02-28 LAB
ALBUMIN SERPL-MCNC: 3.5 G/DL (ref 3.5–5)
ALBUMIN/GLOB SERPL: 1.1 (ref 1.1–2.2)
ALP SERPL-CCNC: 74 U/L (ref 45–117)
ALT SERPL-CCNC: 15 U/L (ref 12–78)
ANION GAP SERPL CALC-SCNC: 4 MMOL/L (ref 5–15)
AST SERPL-CCNC: 20 U/L (ref 15–37)
BILIRUB SERPL-MCNC: 0.3 MG/DL (ref 0.2–1)
BUN SERPL-MCNC: 9 MG/DL (ref 6–20)
BUN/CREAT SERPL: 14 (ref 12–20)
CALCIUM SERPL-MCNC: 9.4 MG/DL (ref 8.5–10.1)
CHLORIDE SERPL-SCNC: 99 MMOL/L (ref 97–108)
CO2 SERPL-SCNC: 37 MMOL/L (ref 21–32)
CREAT SERPL-MCNC: 0.66 MG/DL (ref 0.55–1.02)
GLOBULIN SER CALC-MCNC: 3.3 G/DL (ref 2–4)
GLUCOSE SERPL-MCNC: 83 MG/DL (ref 65–100)
POTASSIUM SERPL-SCNC: 3.7 MMOL/L (ref 3.5–5.1)
PROT SERPL-MCNC: 6.8 G/DL (ref 6.4–8.2)
SODIUM SERPL-SCNC: 140 MMOL/L (ref 136–145)
T4 FREE SERPL-MCNC: 1.3 NG/DL (ref 0.8–1.5)
TSH SERPL DL<=0.05 MIU/L-ACNC: 0.68 UIU/ML (ref 0.36–3.74)

## 2023-02-28 NOTE — PROGRESS NOTES
Subjective:     Jv Ovalle is a 45 y.o. female who presents for follow up of hypertension and hyperlipidemia. Diet and Lifestyle: generally follows a low fat low cholesterol diet, generally follows a low sodium diet, nonsmoker  Home BP Monitoring: is not measured at home    Cardiovascular ROS: taking medications as instructed, no medication side effects noted, no TIA's, no chest pain on exertion, no dyspnea on exertion, no swelling of ankles. New concerns: C/O shoulder pain. In 2020 she had sought dry needling with good results. Need med refills. She no longer FU with [facility] Dr Cesar Altman. Patient Active Problem List    Diagnosis Date Noted    Arthritis of knee, left 02/25/2022    Obesity (BMI 30.0-34.9) 07/21/2020    Acquired hypothyroidism 01/12/2018    Hypercholesterolemia 01/25/2017    Essential hypertension, benign 01/25/2013    Fibromyalgia 11/20/2012    Cervical spondylosis 07/20/2012    Acne rosacea 02/21/2012    Esophageal reflux 02/21/2012    Mild intermittent asthma without complication 71/67/8056     Current Outpatient Medications   Medication Sig Dispense Refill    methocarbamoL (ROBAXIN) 750 mg tablet TAKE 1 TABLET BY MOUTH EVERY DAY EVERY NIGHT 30 Tablet 5    traMADoL (ULTRAM) 50 mg tablet Take 1 Tablet by mouth every eight (8) hours as needed for Pain for up to 30 days. Max Daily Amount: 150 mg. Indications: disorder characterized by stiff, tender & painful muscles 90 Tablet 2    montelukast (SINGULAIR) 10 mg tablet Take 1 Tablet by mouth daily. 90 Tablet 3    ergocalciferol (ERGOCALCIFEROL) 1,250 mcg (50,000 unit) capsule Take 1 Capsule by mouth every seven (7) days. Indications: low vitamin D levels 4 Capsule 14    valACYclovir (VALTREX) 500 mg tablet Take 1 Tablet by mouth daily.  Indications: fever blisters 90 Tablet 1    losartan (COZAAR) 100 mg tablet TAKE 1 TABLET BY MOUTH EVERY DAY FOR BLOOD PRESSURE 90 Tablet 1    hydroCHLOROthiazide (HYDRODIURIL) 25 mg tablet TAKE 1 TABLET BY MOUTH EVERY DAY FOR BLOOD PRESSURE 90 Tablet 1    Synthroid 125 mcg tablet Take 1 Tablet by mouth six (6) days a week. Indications: a condition with low thyroid hormone levels 90 Tablet 1    doxycycline (VIBRAMYCIN) 100 mg capsule TAKE 1 CAPSULE BY MOUTH TWICE A DAY 60 Capsule 5    estradioL (ESTRACE) 2 mg tablet Take 1 Tablet by mouth daily. 90 Tablet 4    albuterol (Ventolin HFA) 90 mcg/actuation inhaler Take 2 Puffs by inhalation every four (4) hours as needed for Wheezing. 18 g 1    OTHER Amberen 2 tablets once daily for menopause (OTC)      TURMERIC PO Take  by mouth. FLAXSEED PO Take  by mouth. CANNABIDIOL, CBD, EXTRACT PO Take  by mouth. acetaminophen (TYLENOL) 500 mg tablet Take 1,000 mg by mouth every six (6) hours as needed for Pain.      multivitamin (ONE A DAY) tablet Take 1 Tab by mouth daily. omeprazole (PRILOSEC) 20 mg capsule Take 20 mg by mouth daily.          Allergies   Allergen Reactions    Monsel's [Ferric Subsulfate] Swelling     Extreme burning, skin sloughing    Other Plant, Animal, Environmental Unknown (comments)     Vinegar    Premarin [Conjugated Estrogens] Itching     Past Medical History:   Diagnosis Date    Abnormal Pap smear 1/2/2012    FUNMI -evaluation negative    Arthritis     Asthma     Atypical squamous cells of undetermined significance (ASCUS) on Papanicolaou smear of cervix 10/08/15    HPV Negative    Autoimmune disease ([facility] [address]     sjogrens    Bartholin's gland cyst     right marsupialization    Diverticulitis     in the past- has seen Dr. Florida Boston for evaluation    Dyspareunia     Endometriosis     Fibromyalgia     GERD (gastroesophageal reflux disease)     Hiatal hernia     Hypercholesterolemia 1/25/2017    Hypertension     Hypothyroid     IBS (irritable bowel syndrome)     Lichen sclerosus     Pelvic pain     Rosacea     Sjogren's syndrome ([facility] [address]     sees Dr. 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    vulvar biopsy done-suggestive of LS     Past Surgical History:   Procedure Laterality Date    HX CHOLECYSTECTOMY      HX COLPOSCOPY  2/14/12    No dysplasia    HX DILATION AND CURETTAGE  2/14/2012    benign tissue- problems with anesthesia afterwards    HX LAPAROSCOPIC SUPRACERVICAL HYSTERECTOMY  11/25/2013    w/ RSO    HX OOPHORECTOMY      HX ORTHOPAEDIC Left     ACLX2    HX OTHER SURGICAL      Bartholins Marsupialization    HX OTHER SURGICAL  02/07    LSO--Laparotomy w/ [place] also    HX OTHER SURGICAL  11/08    vulvar biopsy     Family History   Problem Relation Age of Onset    Heart Disease Mother         pacemaker, defibrillator    Osteoporosis Mother     Diabetes Brother     Hypertension Father      Social History     Tobacco Use    Smoking status: Never    Smokeless tobacco: Never   Substance Use Topics    Alcohol use: Yes     Alcohol/week: 0.0 standard drinks     Comment: very rarely-less than 5X/year             Review of Systems, additional:  Pertinent items are noted in HPI. Objective:     Visit Vitals  /70 (BP 1 Location: Right upper arm)   Pulse 79   Temp 97.7 °F (36.5 °C) (Temporal)   Resp 20   Ht 5' 4\" (1.626 m)   Wt 169 lb 12.8 oz (77 kg)   LMP 11/05/2013   SpO2 98%   BMI 29.15 kg/m²     Appearance: alert, well appearing, and in no distress and normal appearing weight. General exam: CVS exam BP noted to be well controlled today in office, S1, S2 normal, no gallop, no murmur, chest clear, no JVD, no HSM, no edema. Lab review: orders written for new lab studies as appropriate; see orders. Assessment/Plan:     hypertension stable, hyperlipidemia control uncertain. orders and follow up as documented in patient record. ICD-10-CM ICD-9-CM    1. Essential hypertension, benign  I10 401.1       2. Acquired hypothyroidism  E03.9 244.9 TSH 3RD GENERATION      T4, FREE      3.  Fibromyalgia  M79.7 729.1 methocarbamoL (ROBAXIN) 750 mg tablet      traMADoL (ULTRAM) 50 mg tablet      4. Encounter for long-term current use of medication  T87.097 C12.02 METABOLIC PANEL, COMPREHENSIVE      5. Chronic pain of both shoulders  M25.511 719.41 traMADoL (ULTRAM) 50 mg tablet    G89.29 338.29     M25.512        6. Mild intermittent asthma without complication  M84.92 612.90 montelukast (SINGULAIR) 10 mg tablet      7. Vitamin D deficiency  E55.9 268.9 ergocalciferol (ERGOCALCIFEROL) 1,250 mcg (50,000 unit) capsule      8. Herpes simplex infection of skin  B00.9 054.9 valACYclovir (VALTREX) 500 mg tablet      9. Screen for colon cancer  Z12.11 V76.51 COLOGUARD TEST (FECAL DNA COLORECTAL CANCER SCREENING)        Order labs. Med refills ordered. She will get name of PT for referral and dry needling.

## 2023-03-06 ENCOUNTER — TELEPHONE (OUTPATIENT)
Dept: FAMILY MEDICINE CLINIC | Age: 53
End: 2023-03-06

## 2023-03-06 NOTE — TELEPHONE ENCOUNTER
----- Message from Zulema Baird MD sent at 3/5/2023  6:07 PM EST -----  Normal thyroid test. Good potassium. Normal kidney and liver function. today

## 2023-03-12 DIAGNOSIS — L71.9 ACNE ROSACEA: ICD-10-CM

## 2023-03-12 DIAGNOSIS — M17.12 ARTHRITIS OF KNEE, LEFT: ICD-10-CM

## 2023-03-12 RX ORDER — MELOXICAM 15 MG/1
15 TABLET ORAL DAILY
Qty: 30 TABLET | Refills: 5 | OUTPATIENT
Start: 2023-03-12

## 2023-03-12 RX ORDER — DOXYCYCLINE 100 MG/1
CAPSULE ORAL
Qty: 60 CAPSULE | Refills: 5 | Status: SHIPPED | OUTPATIENT
Start: 2023-03-12

## 2023-03-13 ENCOUNTER — TELEPHONE (OUTPATIENT)
Dept: FAMILY MEDICINE CLINIC | Age: 53
End: 2023-03-13

## 2023-03-13 ENCOUNTER — PATIENT MESSAGE (OUTPATIENT)
Dept: FAMILY MEDICINE CLINIC | Age: 53
End: 2023-03-13

## 2023-03-13 DIAGNOSIS — M25.369 INSTABILITY OF KNEE JOINT, UNSPECIFIED LATERALITY: Primary | ICD-10-CM

## 2023-03-13 NOTE — TELEPHONE ENCOUNTER
----- Message from Carol Ann Nettles sent at 3/13/2023 12:43 PM EDT -----  Regarding: FW: Referral to Dr. Job Hamilton     ----- Message -----  From: Tico Castillo  Sent: 3/13/2023  11:47 AM EDT  To: Carol Ann Nettles  Subject: FW: Referral to Dr. Job Hamilton                  ----- Message -----  From: Aleida Deal  Sent: 3/13/2023  10:10 AM EDT  To: Adenike Feldman Pool  Subject: Referral to Dr. Amy Malik. My knee gave out completely last night. I'm back in a full leg knee brace and crutches. I need a referral from you for the insurance company. I'm making an appointment with Dr. Job Hamilton when I finish this msg. You referred me to him before.    Thanks,  Aleida Deal

## 2023-03-16 ENCOUNTER — OFFICE VISIT (OUTPATIENT)
Dept: ORTHOPEDIC SURGERY | Age: 53
End: 2023-03-16

## 2023-03-16 VITALS — BODY MASS INDEX: 29.01 KG/M2 | WEIGHT: 169 LBS

## 2023-03-16 DIAGNOSIS — E03.9 ACQUIRED HYPOTHYROIDISM: ICD-10-CM

## 2023-03-16 DIAGNOSIS — M25.562 ACUTE PAIN OF LEFT KNEE: Primary | ICD-10-CM

## 2023-03-16 DIAGNOSIS — M12.562 TRAUMATIC ARTHRITIS OF LEFT KNEE: ICD-10-CM

## 2023-03-16 RX ORDER — LEVOTHYROXINE SODIUM 125 UG/1
125 TABLET ORAL
Qty: 90 TABLET | Refills: 1 | Status: SHIPPED | OUTPATIENT
Start: 2023-03-16

## 2023-03-16 NOTE — LETTER
3/16/2023    Patient: Ranjit Segura   YOB: 1970   Date of Visit: 0/59/6617     Kt Torrez MD  [address] [place] [address]    Dear Kt Torrez MD,      Thank you for referring Ms. Ranjit Segura to [facility] for evaluation. My notes for this consultation are attached. If you have questions, please do not hesitate to call me. I look forward to following your patient along with you.       Sincerely,    Juan Lyons MD

## 2023-03-16 NOTE — PROGRESS NOTES
Karna Hammans (: 1970) is a 52 y.o. female, patient, here for evaluation of the following chief complaint(s):  Knee Pain (Left knee pain )       HPI:    Patient presents the office today with a chief complaint of left knee pain. This is a patient has had chronic knee pain. She is here today with her more acute on chronic knee pain. She describes discomfort across the center portion of the knee that progresses to the outer portion of the knee. She has had to use her crutches and a a brace lately. She felt like the knee wanted to give out. She is here today pretty frustrated. She has had problems with this knee for quite some time. It is now elevated to a higher level. She describes her pain 10 out of 10. She is here today with her . Allergies   Allergen Reactions    Monsel's [Ferric Subsulfate] Swelling     Extreme burning, skin sloughing    Other Plant, Animal, Environmental Unknown (comments)     Vinegar    Premarin [Conjugated Estrogens] Itching       Current Outpatient Medications   Medication Sig    doxycycline (VIBRAMYCIN) 100 mg capsule TAKE 1 CAPSULE BY MOUTH TWICE A DAY    methocarbamoL (ROBAXIN) 750 mg tablet TAKE 1 TABLET BY MOUTH EVERY DAY EVERY NIGHT    traMADoL (ULTRAM) 50 mg tablet Take 1 Tablet by mouth every eight (8) hours as needed for Pain for up to 30 days. Max Daily Amount: 150 mg. Indications: disorder characterized by stiff, tender & painful muscles    montelukast (SINGULAIR) 10 mg tablet Take 1 Tablet by mouth daily. ergocalciferol (ERGOCALCIFEROL) 1,250 mcg (50,000 unit) capsule Take 1 Capsule by mouth every seven (7) days. Indications: low vitamin D levels    valACYclovir (VALTREX) 500 mg tablet Take 1 Tablet by mouth daily.  Indications: fever blisters    losartan (COZAAR) 100 mg tablet TAKE 1 TABLET BY MOUTH EVERY DAY FOR BLOOD PRESSURE    hydroCHLOROthiazide (HYDRODIURIL) 25 mg tablet TAKE 1 TABLET BY MOUTH EVERY DAY FOR BLOOD PRESSURE    Synthroid 125 mcg tablet Take 1 Tablet by mouth six (6) days a week. Indications: a condition with low thyroid hormone levels    estradioL (ESTRACE) 2 mg tablet Take 1 Tablet by mouth daily. albuterol (Ventolin HFA) 90 mcg/actuation inhaler Take 2 Puffs by inhalation every four (4) hours as needed for Wheezing. OTHER Amberen 2 tablets once daily for menopause (OTC)    TURMERIC PO Take  by mouth. FLAXSEED PO Take  by mouth. CANNABIDIOL, CBD, EXTRACT PO Take  by mouth. acetaminophen (TYLENOL) 500 mg tablet Take 1,000 mg by mouth every six (6) hours as needed for Pain.    multivitamin (ONE A DAY) tablet Take 1 Tab by mouth daily. omeprazole (PRILOSEC) 20 mg capsule Take 20 mg by mouth daily. No current facility-administered medications for this visit.        Past Medical History:   Diagnosis Date    Abnormal Pap smear 1/2/2012    FUNMI -evaluation negative    Arthritis     Asthma     Atypical squamous cells of undetermined significance (ASCUS) on Papanicolaou smear of cervix 10/08/15    HPV Negative    Autoimmune disease ([facility] [address]     sjogrens    Bartholin's gland cyst     right marsupialization    Diverticulitis     in the past- has seen Dr. Lois Granger for evaluation    Dyspareunia     Endometriosis     Fibromyalgia     GERD (gastroesophageal reflux disease)     Hiatal hernia     Hypercholesterolemia 1/25/2017    Hypertension     Hypothyroid     IBS (irritable bowel syndrome)     Lichen sclerosus     Pelvic pain     Rosacea     Sjogren's syndrome ([facility] [address]     sees Dr. 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    vulvar biopsy done-suggestive of LS        Past Surgical History:   Procedure Laterality Date    HX CHOLECYSTECTOMY      HX COLPOSCOPY  2/14/12    No dysplasia    HX DILATION AND CURETTAGE  2/14/2012    benign tissue- problems with anesthesia afterwards    HX LAPAROSCOPIC SUPRACERVICAL HYSTERECTOMY  11/25/2013    w/ RSO    HX OOPHORECTOMY      HX ORTHOPAEDIC Left     ACLX2    HX OTHER SURGICAL      Bartholins Marsupialization    HX OTHER SURGICAL  02/07    LSO--Laparotomy w/ [place] also    HX OTHER SURGICAL  11/08    vulvar biopsy       Family History   Problem Relation Age of Onset    Heart Disease Mother         pacemaker, defibrillator    Osteoporosis Mother     Diabetes Brother     Hypertension Father         Social History     Socioeconomic History    Marital status:      Spouse name: Not on file    Number of children: Not on file    Years of education: Not on file    Highest education level: Not on file   Occupational History    Not on file   Tobacco Use    Smoking status: Never    Smokeless tobacco: Never   Vaping Use    Vaping Use: Never used   Substance and Sexual Activity    Alcohol use: Yes     Alcohol/week: 0.0 standard drinks     Comment: very rarely-less than 5X/year    Drug use: No    Sexual activity: Yes     Partners: Male     Birth control/protection: Surgical     Comment: Hysterectomy   Other Topics Concern    Not on file   Social History Narrative    Not on file     Social Determinants of Health     Financial Resource Strain: Low Risk     Difficulty of Paying Living Expenses: Not hard at all   Food Insecurity: No Food Insecurity    Worried About Running Out of Food in the Last Year: Never true    Ran Out of Food in the Last Year: Never true   Transportation Needs: Not on file   Physical Activity: Not on file   Stress: Not on file   Social Connections: Not on file   Intimate Partner Violence: Not on file   Housing Stability: Not on file       Review of Systems   Musculoskeletal:         Left knee      Vitals: Wt 169 lb (76.7 kg)   LMP 11/05/2013   BMI 29.01 kg/m²    Body mass index is 29.01 kg/m². Ortho Exam     Patient is alert and oriented x3. Patient is in no acute distress. Patient ambulates with an antalgic gait      Left knee: Valgus deformity.   Prior incision from knee field. Minimal effusion. Range of motion +5-120 degrees. Positive crepitation throughout. She has no collateral ligament instability. Anterior and posterior drawer negative negative. She has mild pain with manipulation of the patella. There is no swelling noted distally. Neurovascular examination is intact. Right knee: There is no abrasions, lacerations, ecchymosis or soft tissue swelling. No effusion is identified. There is no pain to palpation along the medial or lateral border of the patella. There is no pain or crepitation with manipulation of the patella. There is normal excursion of the patella. Patellar grind test is negative. Active and passive range of motion is full and does not cause pain or crepitation. There is no pain with palpation along the medial femoral epicondyle or medial tibia and no pain with palpation over the lateral femoral epicondyle. There is no medial or lateral joint line tenderness. Osmani's maneuver is negative. There is no collateral ligament instability. Anterior drawer, Lachman and posterior drawer are negative. There is no soft tissue swelling distally into the leg. Neurocirculatory examination is intact. XR Results (most recent):  Results from Appointment encounter on 03/16/23    XR KNEE LT MIN 4 V    Narrative  4 view x-ray of the left knee reveals significant osteoarthritis of the lateral compartment of the left knee with retained hardware from prior anterior cruciate ligament repair. Patient has valgus deformity. ASSESSMENT/PLAN:    Patient presents to the office today with end-stage osteoarthritis of left knee. She is grown very frustrated with this. We have talked about nonoperative treatment options. She has had this before in the past.  She is considering a knee replacement. She is only 52years of age. However, she is quite visible and has bone-on-bone arthritis of the knee.   With her dysfunction and her pain it is reasonable to consider total knee replacement. She understands the longevity of the total knee. We have talked about the surgery in great detail. We have gone over the risks and benefits discussed the use of metal and plastic. She would like to proceed with this sometime in the near future. The risks and benefits were described to the patient. The patient understands there is a risk of infection, postoperative pain, numbness, tingling, stiffness DVT, PE, MI, CVA and any other unforeseen events. The patient also understands there is a long rehabilitative process that typically follows the surgical procedure. We talked about the possibility of not being able to alleviate all of the discomfort. Also, I explained  there is no guarantee all function and strength will return. The patient understands the possiblity that  implants may be utilized during this surgery. The patient also understands the generalized, associated risk of anesthetic and wishes to proceed in an elective fashion.         MD justina Monzon

## 2023-03-20 DIAGNOSIS — M12.562 TRAUMATIC ARTHRITIS OF LEFT KNEE: Primary | ICD-10-CM

## 2023-03-23 NOTE — PROGRESS NOTES
Covid/flu, strep negative   Subjective:   46 y.o. female for Well Woman Check. Patient's last menstrual period was 11/05/2013. Social History: single partner, contraception - post menopausal status. Pertinent past medical hstory: hypertension. Patient Active Problem List    Diagnosis Date Noted    Obesity (BMI 30.0-34.9) 07/21/2020    Acquired hypothyroidism 01/12/2018    Hypercholesterolemia 01/25/2017    Essential hypertension, benign 01/25/2013    Fibromyalgia 11/20/2012    Cervical spondylosis 07/20/2012    Acne rosacea 02/21/2012    Esophageal reflux 02/21/2012    Mild intermittent asthma without complication 09/59/4622     Current Outpatient Medications   Medication Sig Dispense Refill    OTHER Amberen 2 tablets once daily for menopause (OTC)      traMADoL (ULTRAM) 50 mg tablet Take 1 Tablet by mouth every eight (8) hours as needed for Pain for up to 180 days. Indications: pain 90 Tablet 5    montelukast (SINGULAIR) 10 mg tablet TAKE 1 TABLET BY MOUTH EVERY DAY 90 Tablet 1    Synthroid 150 mcg tablet Take 1 Tablet by mouth Daily (before breakfast). Indications: a condition with low thyroid hormone levels 90 Tablet 1    doxycycline (VIBRAMYCIN) 100 mg capsule TAKE 1 CAPSULE BY MOUTH TWICE A DAY (Patient taking differently: 100 mg two (2) times daily as needed (rosacea). ) 60 Capsule 5    losartan-hydroCHLOROthiazide (HYZAAR) 100-25 mg per tablet TAKE 1 TAB BY MOUTH DAILY. INDICATIONS: HIGH BLOOD PRESSURE 90 Tablet 0    valACYclovir (VALTREX) 500 mg tablet TAKE 1 TABLET BY MOUTH EVERY DAY 90 Tablet 1    ergocalciferol (ERGOCALCIFEROL) 1,250 mcg (50,000 unit) capsule TAKE 1 CAP BY MOUTH EVERY SEVEN (7) DAYS. INDICATIONS: LOW VITAMIN D LEVELS 5 Cap 11    albuterol (VENTOLIN HFA) 90 mcg/actuation inhaler Take 2 Puffs by inhalation every four (4) hours as needed for Wheezing. 3 Inhaler 1    TURMERIC PO Take  by mouth.  FLAXSEED PO Take  by mouth.  CANNABIDIOL, CBD, EXTRACT PO Take  by mouth.       acetaminophen (TYLENOL EXTRA STRENGTH) 500 mg tablet Take 1,000 mg by mouth every six (6) hours as needed for Pain.  multivitamin (ONE A DAY) tablet Take 1 Tab by mouth daily.  omeprazole (PRILOSEC) 20 mg capsule Take 20 mg by mouth daily.          Allergies   Allergen Reactions    Monsel's [Ferric Subsulfate] Swelling     Extreme burning, skin sloughing    Other Plant, Animal, Environmental Unknown (comments)     Vinegar    Premarin [Conjugated Estrogens] Itching     Past Medical History:   Diagnosis Date    Abnormal Pap smear 1/2/2012    FUNMI -evaluation negative    Arthritis     Asthma     Atypical squamous cells of undetermined significance (ASCUS) on Papanicolaou smear of cervix 10/08/15    HPV Negative    Autoimmune disease ([facility] [address]     sjogrens    Bartholin's gland cyst     right marsupialization    Diverticulitis     in the past- has seen Dr. Sylvain Torres for evaluation    Dyspareunia     Endometriosis     Fibromyalgia     GERD (gastroesophageal reflux disease)     Hiatal hernia     Hypercholesterolemia 1/25/2017    Hypertension     Hypothyroid     IBS (irritable bowel syndrome)     Lichen sclerosus     Pelvic pain     Rosacea     Sjogren's syndrome ([facility] [address]     sees Dr. 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    vulvar biopsy done-suggestive of LS     Past Surgical History:   Procedure Laterality Date    HX CHOLECYSTECTOMY      HX COLPOSCOPY  2/14/12    No dysplasia    HX DILATION AND CURETTAGE  2/14/2012    benign tissue- problems with anesthesia afterwards    HX LAPAROSCOPIC SUPRACERVICAL HYSTERECTOMY  11/25/2013    w/ RSO    HX OOPHORECTOMY      HX ORTHOPAEDIC Left     ACLX2    HX OTHER SURGICAL      Bartholins Marsupialization    HX OTHER SURGICAL  02/07    LSO--Laparotomy w/ [place] also    HX OTHER SURGICAL  11/08    vulvar biopsy     Family History   Problem Relation Age of Onset    Heart Disease Mother         pacemaker, defibrillator    Osteoporosis Mother     Diabetes Brother     Hypertension Father      Social History     Tobacco Use    Smoking status: Never Smoker    Smokeless tobacco: Never Used   Substance Use Topics    Alcohol use: Yes     Alcohol/week: 0.0 standard drinks     Comment: very rarely-less than 5X/year        ROS:  Feeling well. No dyspnea or chest pain on exertion. No abdominal pain, change in bowel habits, black or bloody stools. No urinary tract symptoms. GYN ROS: she complains of hot flashes. No neurological complaints. Objective:     Visit Vitals  /70 (BP 1 Location: Left upper arm, BP Patient Position: Sitting, BP Cuff Size: Large adult)   Pulse 65   Temp 98 °F (36.7 °C) (Temporal)   Resp 18   Ht 5' 4\" (1.626 m)   Wt 175 lb (79.4 kg)   LMP 11/05/2013   SpO2 99%   BMI 30.04 kg/m²     The patient appears well, alert, oriented x 3, in no distress. ENT normal.  Neck supple. No adenopathy or thyromegaly. PEPE. Lungs are clear, good air entry, no wheezes, rhonchi or rales. S1 and S2 normal, no murmurs, regular rate and rhythm. Abdomen soft without tenderness, guarding, mass or organomegaly. Extremities show no edema, normal peripheral pulses. Neurological is normal, no focal findings. Assessment/Plan:   well woman  mammogram  additional lab tests per orders  return annually or prn    ICD-10-CM ICD-9-CM    1. Routine adult health maintenance  Z00.00 V70.0 CBC WITH AUTOMATED DIFF      METABOLIC PANEL, COMPREHENSIVE      LIPID PANEL      HEMOGLOBIN A1C WITH EAG      TSH 3RD GENERATION      T4, FREE      T4, FREE      TSH 3RD GENERATION      HEMOGLOBIN A1C WITH EAG      LIPID PANEL      METABOLIC PANEL, COMPREHENSIVE      CBC WITH AUTOMATED DIFF   2. Vitamin D deficiency  E55.9 268.9 VITAMIN D, 25 HYDROXY      VITAMIN D, 25 HYDROXY   3. Vitamin B 12 deficiency  E53.8 266.2 VITAMIN B12      VITAMIN B12   4.  Fibromyalgia  M79.7 729.1 traMADoL (ULTRAM) 50 mg tablet   5. Chronic pain of left knee  M25.562 719.46 traMADoL (ULTRAM) 50 mg tablet    G89.29 338.29    6. Mild intermittent asthma without complication  S55.12 865.08 montelukast (SINGULAIR) 10 mg tablet   7. Acquired hypothyroidism  E03.9 244.9 Synthroid 150 mcg tablet   8. Encounter for screening mammogram for breast cancer  Z12.31 V76.12 [facility] MAMMO BI SCREENING INCL CAD   . Order labs. Order mammogram  I called [facility] to check on Tramadol dispensing.   Pt has had to go  7 day supply since refill sent in Feb.

## 2023-04-25 DIAGNOSIS — Z96.652 HISTORY OF TOTAL KNEE ARTHROPLASTY, LEFT: Primary | ICD-10-CM

## 2023-04-27 DIAGNOSIS — Z96.652 HISTORY OF TOTAL KNEE ARTHROPLASTY, LEFT: Primary | ICD-10-CM

## 2023-04-27 RX ORDER — OXYCODONE AND ACETAMINOPHEN 5; 325 MG/1; MG/1
1 TABLET ORAL
Qty: 40 TABLET | Refills: 0 | Status: SHIPPED | OUTPATIENT
Start: 2023-04-27 | End: 2023-05-04

## 2023-04-27 RX ORDER — WARFARIN 2 MG/1
2 TABLET ORAL DAILY
Qty: 30 TABLET | Refills: 1 | Status: SHIPPED | OUTPATIENT
Start: 2023-04-27

## 2023-05-05 ENCOUNTER — TELEPHONE (OUTPATIENT)
Dept: ORTHOPEDIC SURGERY | Age: 53
End: 2023-05-05

## 2023-05-05 DIAGNOSIS — Z96.652 HISTORY OF TOTAL KNEE ARTHROPLASTY, LEFT: Primary | ICD-10-CM

## 2023-05-05 DIAGNOSIS — M25.562 ACUTE PAIN OF LEFT KNEE: Primary | ICD-10-CM

## 2023-05-05 RX ORDER — OXYCODONE AND ACETAMINOPHEN 5; 325 MG/1; MG/1
1 TABLET ORAL
Qty: 40 TABLET | Refills: 0 | OUTPATIENT
Start: 2023-05-05 | End: 2023-05-12

## 2023-05-05 RX ORDER — OXYCODONE AND ACETAMINOPHEN 5; 325 MG/1; MG/1
1 TABLET ORAL
Qty: 40 TABLET | Refills: 0 | Status: SHIPPED | OUTPATIENT
Start: 2023-05-05 | End: 2023-05-12

## 2023-07-18 DIAGNOSIS — I10 ESSENTIAL (PRIMARY) HYPERTENSION: ICD-10-CM

## 2023-07-18 RX ORDER — LOSARTAN POTASSIUM 100 MG/1
TABLET ORAL
Qty: 90 TABLET | Refills: 1 | Status: SHIPPED | OUTPATIENT
Start: 2023-07-18

## 2023-07-18 RX ORDER — HYDROCHLOROTHIAZIDE 25 MG/1
TABLET ORAL
Qty: 90 TABLET | Refills: 1 | Status: SHIPPED | OUTPATIENT
Start: 2023-07-18

## 2023-09-26 DIAGNOSIS — L71.9 ROSACEA, UNSPECIFIED: ICD-10-CM

## 2023-09-26 RX ORDER — DOXYCYCLINE HYCLATE 100 MG/1
CAPSULE ORAL
Qty: 60 CAPSULE | Refills: 5 | Status: SHIPPED | OUTPATIENT
Start: 2023-09-26

## 2023-10-03 DIAGNOSIS — E03.9 HYPOTHYROIDISM, UNSPECIFIED: ICD-10-CM

## 2023-10-03 RX ORDER — LEVOTHYROXINE SODIUM 125 MCG
TABLET ORAL
Qty: 90 TABLET | Refills: 0 | Status: SHIPPED | OUTPATIENT
Start: 2023-10-03

## 2023-10-25 ENCOUNTER — OFFICE VISIT (OUTPATIENT)
Age: 53
End: 2023-10-25
Payer: COMMERCIAL

## 2023-10-25 VITALS
HEART RATE: 71 BPM | SYSTOLIC BLOOD PRESSURE: 116 MMHG | TEMPERATURE: 99.5 F | OXYGEN SATURATION: 98 % | HEIGHT: 64 IN | BODY MASS INDEX: 27.49 KG/M2 | RESPIRATION RATE: 16 BRPM | WEIGHT: 161 LBS | DIASTOLIC BLOOD PRESSURE: 70 MMHG

## 2023-10-25 DIAGNOSIS — I10 ESSENTIAL (PRIMARY) HYPERTENSION: ICD-10-CM

## 2023-10-25 DIAGNOSIS — L60.8 DISCOLORATION OF NAIL: ICD-10-CM

## 2023-10-25 DIAGNOSIS — Z11.4 SCREENING FOR HIV WITHOUT PRESENCE OF RISK FACTORS: ICD-10-CM

## 2023-10-25 DIAGNOSIS — M79.7 FIBROMYALGIA: ICD-10-CM

## 2023-10-25 DIAGNOSIS — Z00.00 ENCOUNTER FOR WELL ADULT EXAM WITHOUT ABNORMAL FINDINGS: Primary | ICD-10-CM

## 2023-10-25 DIAGNOSIS — E03.9 HYPOTHYROIDISM, UNSPECIFIED TYPE: ICD-10-CM

## 2023-10-25 DIAGNOSIS — G89.29 OTHER CHRONIC PAIN: ICD-10-CM

## 2023-10-25 DIAGNOSIS — E55.9 VITAMIN D DEFICIENCY, UNSPECIFIED: ICD-10-CM

## 2023-10-25 PROCEDURE — 3074F SYST BP LT 130 MM HG: CPT | Performed by: FAMILY MEDICINE

## 2023-10-25 PROCEDURE — 3078F DIAST BP <80 MM HG: CPT | Performed by: FAMILY MEDICINE

## 2023-10-25 PROCEDURE — 99396 PREV VISIT EST AGE 40-64: CPT | Performed by: FAMILY MEDICINE

## 2023-10-25 RX ORDER — ERGOCALCIFEROL 1.25 MG/1
50000 CAPSULE ORAL
Qty: 12 CAPSULE | Refills: 3 | Status: SHIPPED | OUTPATIENT
Start: 2023-10-25

## 2023-10-25 RX ORDER — TRAMADOL HYDROCHLORIDE 50 MG/1
TABLET ORAL
COMMUNITY
Start: 2023-07-18 | End: 2023-10-25 | Stop reason: SDUPTHER

## 2023-10-25 RX ORDER — MONTELUKAST SODIUM 10 MG/1
10 TABLET ORAL DAILY
Qty: 90 TABLET | Refills: 3 | Status: SHIPPED | OUTPATIENT
Start: 2023-10-25

## 2023-10-25 RX ORDER — LOSARTAN POTASSIUM 25 MG/1
25 TABLET ORAL DAILY
Qty: 90 TABLET | Refills: 1 | Status: SHIPPED | OUTPATIENT
Start: 2023-10-25

## 2023-10-25 RX ORDER — TRAMADOL HYDROCHLORIDE 50 MG/1
50 TABLET ORAL EVERY 8 HOURS PRN
Qty: 60 TABLET | Refills: 0 | Status: SHIPPED | OUTPATIENT
Start: 2023-10-25 | End: 2023-11-24

## 2023-10-25 SDOH — ECONOMIC STABILITY: HOUSING INSECURITY
IN THE LAST 12 MONTHS, WAS THERE A TIME WHEN YOU DID NOT HAVE A STEADY PLACE TO SLEEP OR SLEPT IN A SHELTER (INCLUDING NOW)?: NO

## 2023-10-25 SDOH — ECONOMIC STABILITY: FOOD INSECURITY: WITHIN THE PAST 12 MONTHS, THE FOOD YOU BOUGHT JUST DIDN'T LAST AND YOU DIDN'T HAVE MONEY TO GET MORE.: NEVER TRUE

## 2023-10-25 SDOH — ECONOMIC STABILITY: FOOD INSECURITY: WITHIN THE PAST 12 MONTHS, YOU WORRIED THAT YOUR FOOD WOULD RUN OUT BEFORE YOU GOT MONEY TO BUY MORE.: NEVER TRUE

## 2023-10-25 SDOH — ECONOMIC STABILITY: INCOME INSECURITY: HOW HARD IS IT FOR YOU TO PAY FOR THE VERY BASICS LIKE FOOD, HOUSING, MEDICAL CARE, AND HEATING?: NOT HARD AT ALL

## 2023-10-25 ASSESSMENT — PATIENT HEALTH QUESTIONNAIRE - PHQ9
SUM OF ALL RESPONSES TO PHQ QUESTIONS 1-9: 0
2. FEELING DOWN, DEPRESSED OR HOPELESS: 0
1. LITTLE INTEREST OR PLEASURE IN DOING THINGS: 0
SUM OF ALL RESPONSES TO PHQ QUESTIONS 1-9: 0
SUM OF ALL RESPONSES TO PHQ9 QUESTIONS 1 & 2: 0

## 2023-10-26 ENCOUNTER — NURSE ONLY (OUTPATIENT)
Age: 53
End: 2023-10-26

## 2023-10-26 DIAGNOSIS — Z00.00 ENCOUNTER FOR WELL ADULT EXAM WITHOUT ABNORMAL FINDINGS: ICD-10-CM

## 2023-10-26 DIAGNOSIS — E03.9 HYPOTHYROIDISM, UNSPECIFIED TYPE: ICD-10-CM

## 2023-10-26 DIAGNOSIS — Z11.4 SCREENING FOR HIV WITHOUT PRESENCE OF RISK FACTORS: ICD-10-CM

## 2023-10-26 DIAGNOSIS — E55.9 VITAMIN D DEFICIENCY, UNSPECIFIED: ICD-10-CM

## 2023-10-27 LAB
25(OH)D3 SERPL-MCNC: 54.4 NG/ML (ref 30–100)
ALBUMIN SERPL-MCNC: 3.5 G/DL (ref 3.5–5)
ALBUMIN/GLOB SERPL: 1.2 (ref 1.1–2.2)
ALP SERPL-CCNC: 51 U/L (ref 45–117)
ALT SERPL-CCNC: 15 U/L (ref 12–78)
ANION GAP SERPL CALC-SCNC: 5 MMOL/L (ref 5–15)
AST SERPL-CCNC: 20 U/L (ref 15–37)
BASOPHILS # BLD: 0 K/UL (ref 0–0.1)
BASOPHILS NFR BLD: 1 % (ref 0–1)
BILIRUB SERPL-MCNC: 0.5 MG/DL (ref 0.2–1)
BUN SERPL-MCNC: 10 MG/DL (ref 6–20)
BUN/CREAT SERPL: 19 (ref 12–20)
CALCIUM SERPL-MCNC: 9.3 MG/DL (ref 8.5–10.1)
CHLORIDE SERPL-SCNC: 105 MMOL/L (ref 97–108)
CHOLEST SERPL-MCNC: 205 MG/DL
CO2 SERPL-SCNC: 29 MMOL/L (ref 21–32)
CREAT SERPL-MCNC: 0.53 MG/DL (ref 0.55–1.02)
DIFFERENTIAL METHOD BLD: ABNORMAL
EOSINOPHIL # BLD: 0.2 K/UL (ref 0–0.4)
EOSINOPHIL NFR BLD: 3 % (ref 0–7)
ERYTHROCYTE [DISTWIDTH] IN BLOOD BY AUTOMATED COUNT: 13.2 % (ref 11.5–14.5)
GLOBULIN SER CALC-MCNC: 3 G/DL (ref 2–4)
GLUCOSE SERPL-MCNC: 97 MG/DL (ref 65–100)
HCT VFR BLD AUTO: 39 % (ref 35–47)
HDLC SERPL-MCNC: 98 MG/DL
HDLC SERPL: 2.1 (ref 0–5)
HGB BLD-MCNC: 12.8 G/DL (ref 11.5–16)
HIV 1+2 AB+HIV1 P24 AG SERPL QL IA: NONREACTIVE
HIV 1/2 RESULT COMMENT: NORMAL
IMM GRANULOCYTES # BLD AUTO: 0 K/UL (ref 0–0.04)
IMM GRANULOCYTES NFR BLD AUTO: 1 % (ref 0–0.5)
LDLC SERPL CALC-MCNC: 96 MG/DL (ref 0–100)
LYMPHOCYTES # BLD: 1.2 K/UL (ref 0.8–3.5)
LYMPHOCYTES NFR BLD: 21 % (ref 12–49)
MAGNESIUM SERPL-MCNC: 2.1 MG/DL (ref 1.6–2.4)
MCH RBC QN AUTO: 30 PG (ref 26–34)
MCHC RBC AUTO-ENTMCNC: 32.8 G/DL (ref 30–36.5)
MCV RBC AUTO: 91.5 FL (ref 80–99)
MONOCYTES # BLD: 0.5 K/UL (ref 0–1)
MONOCYTES NFR BLD: 8 % (ref 5–13)
NEUTS SEG # BLD: 4 K/UL (ref 1.8–8)
NEUTS SEG NFR BLD: 66 % (ref 32–75)
NRBC # BLD: 0 K/UL (ref 0–0.01)
NRBC BLD-RTO: 0 PER 100 WBC
PLATELET # BLD AUTO: 285 K/UL (ref 150–400)
PMV BLD AUTO: 10.2 FL (ref 8.9–12.9)
POTASSIUM SERPL-SCNC: 4.1 MMOL/L (ref 3.5–5.1)
PROT SERPL-MCNC: 6.5 G/DL (ref 6.4–8.2)
RBC # BLD AUTO: 4.26 M/UL (ref 3.8–5.2)
SODIUM SERPL-SCNC: 139 MMOL/L (ref 136–145)
T4 FREE SERPL-MCNC: 1.4 NG/DL (ref 0.8–1.5)
TRIGL SERPL-MCNC: 55 MG/DL
TSH SERPL DL<=0.05 MIU/L-ACNC: 0.3 UIU/ML (ref 0.36–3.74)
VLDLC SERPL CALC-MCNC: 11 MG/DL
WBC # BLD AUTO: 5.9 K/UL (ref 3.6–11)

## 2023-10-30 ENCOUNTER — TELEPHONE (OUTPATIENT)
Age: 53
End: 2023-10-30

## 2023-12-01 DIAGNOSIS — M79.7 FIBROMYALGIA: ICD-10-CM

## 2023-12-01 DIAGNOSIS — G89.29 OTHER CHRONIC PAIN: ICD-10-CM

## 2023-12-01 RX ORDER — TRAMADOL HYDROCHLORIDE 50 MG/1
50 TABLET ORAL EVERY 8 HOURS PRN
Qty: 60 TABLET | Refills: 0 | Status: SHIPPED | OUTPATIENT
Start: 2023-12-01 | End: 2023-12-31

## 2024-01-06 DIAGNOSIS — E03.9 HYPOTHYROIDISM, UNSPECIFIED: ICD-10-CM

## 2024-01-09 RX ORDER — LEVOTHYROXINE SODIUM 125 MCG
TABLET ORAL
Qty: 90 TABLET | Refills: 3 | Status: SHIPPED | OUTPATIENT
Start: 2024-01-09

## 2024-01-10 DIAGNOSIS — I10 ESSENTIAL (PRIMARY) HYPERTENSION: ICD-10-CM

## 2024-01-10 RX ORDER — HYDROCHLOROTHIAZIDE 25 MG/1
TABLET ORAL
Qty: 90 TABLET | Refills: 1 | Status: SHIPPED | OUTPATIENT
Start: 2024-01-10

## 2024-04-06 DIAGNOSIS — I10 ESSENTIAL (PRIMARY) HYPERTENSION: ICD-10-CM

## 2024-04-08 RX ORDER — LOSARTAN POTASSIUM 25 MG/1
25 TABLET ORAL DAILY
Qty: 90 TABLET | Refills: 1 | Status: SHIPPED | OUTPATIENT
Start: 2024-04-08

## 2024-05-01 ENCOUNTER — TELEPHONE (OUTPATIENT)
Age: 54
End: 2024-05-01

## 2024-05-01 NOTE — TELEPHONE ENCOUNTER
----- Message from Courtney You sent at 4/29/2024  4:35 PM EDT -----  Regarding: FW: Need a referal  Contact: [phone]  Patient needs insurance referral.    ----- Message -----  From: Concepcion Hidalgo  Sent: 4/29/2024   4:01 PM EDT  To: [facility] Clinical Staff  Subject: Need a referal                                   Yes dilma Solomon

## 2024-05-01 NOTE — TELEPHONE ENCOUNTER
I am unfamiliar with Rosi needing Insurance Referrals- Called [facility] for clarification. There is nothing in there system stating that the patient needs an insurance referral, Left voicemail with patient suggestion she call her insurance double checking that she needs one. She has not needed one for any of her previous visits

## 2024-05-24 ENCOUNTER — TELEPHONE (OUTPATIENT)
Age: 54
End: 2024-05-24

## 2024-05-24 RX ORDER — ESTRADIOL 2 MG/1
2 TABLET ORAL DAILY
Qty: 90 TABLET | Refills: 0 | Status: SHIPPED | OUTPATIENT
Start: 2024-05-24

## 2024-05-24 NOTE — TELEPHONE ENCOUNTER
Two patient identifies used    54 year old patient last seen in the office on 6/13/2023 for ae and has next ae and mammogram scheduled for 8/14/2024    Patient asking for refill of her  estradiol (ESTRACE) 2 MG tablet to get to her scheduled appointment    Patient advised of need to keep appointment in order to get further refills    Patient verbalized understanding    Prescription refills sent as per MD verbal order to get patient to her scheduled appointment

## 2024-07-02 DIAGNOSIS — I10 ESSENTIAL (PRIMARY) HYPERTENSION: ICD-10-CM

## 2024-07-02 RX ORDER — HYDROCHLOROTHIAZIDE 25 MG/1
TABLET ORAL
Qty: 90 TABLET | Refills: 1 | Status: SHIPPED | OUTPATIENT
Start: 2024-07-02

## 2024-07-16 DIAGNOSIS — I10 ESSENTIAL (PRIMARY) HYPERTENSION: ICD-10-CM

## 2024-07-16 RX ORDER — LOSARTAN POTASSIUM 25 MG/1
25 TABLET ORAL DAILY
Qty: 90 TABLET | Refills: 0 | Status: SHIPPED | OUTPATIENT
Start: 2024-07-16 | End: 2024-08-05

## 2024-08-04 NOTE — PROGRESS NOTES
Concepcion Hidalgo (:  1970) is a 54 y.o. female,Established patient, here for evaluation of the following chief complaint(s):  Medication Check (Patient ), Blood Pressure Check (Patients BP was a little low Saturday and  and was around 91/62 and would like to discuss ), and Nail Problem (Patient would like to discuss possible fungal on her foot )        ASSESSMENT/PLAN:  1. Essential hypertension, benign  2. Hypercholesterolemia  -     Lipid Panel; Future  3. Acquired hypothyroidism  -     TSH; Future  -     T4, Free; Future  4. Fibromyalgia  5. Other long term (current) drug therapy  -     Comprehensive Metabolic Panel; Future  -     CBC with Auto Differential; Future  -     Magnesium; Future  6. Discoloration of nail  -     ciclopirox (PENLAC) 8 % solution; Apply topically nightly., Disp-6 mL, R-5, Normal  7. Herpesviral infection, unspecified  -     valACYclovir (VALTREX) 500 MG tablet; Take 1 tablet by mouth daily, Disp-90 tablet, R-3Normal  8. Back disorder  -     XR THORACIC SPINE (2 VIEWS); Future  -     XR LUMBAR SPINE (2-3 VIEWS); Future      No follow-ups on file.      SUBJECTIVE/OBJECTIVE:  HPI  Wt loss 30 lb since last visit.  She feels wt is stable since May. Eating less.  BP running low.  Regular exercise daily.  Fibromyalgia is better. Is not taking Tramadol.  Notes popping noise on L side of back when she bends a certain way.    Current Outpatient Medications   Medication Sig Dispense Refill    Multiple Vitamins-Minerals (HAIR SKIN AND NAILS FORMULA) TABS Take by mouth      COLLAGEN PO Take by mouth      ciclopirox (PENLAC) 8 % solution Apply topically nightly. 6 mL 5    valACYclovir (VALTREX) 500 MG tablet Take 1 tablet by mouth daily 90 tablet 3    hydroCHLOROthiazide (HYDRODIURIL) 25 MG tablet TAKE 1 TABLET BY MOUTH EVERY DAY FOR BLOOD PRESSURE (Patient taking differently: Take 1 tablet by mouth as needed (swelling)) 90 tablet 1    estradiol (ESTRACE) 2 MG tablet Take 1 tablet by mouth

## 2024-08-05 ENCOUNTER — OFFICE VISIT (OUTPATIENT)
Age: 54
End: 2024-08-05
Payer: COMMERCIAL

## 2024-08-05 ENCOUNTER — LAB (OUTPATIENT)
Age: 54
End: 2024-08-05

## 2024-08-05 VITALS
WEIGHT: 132.8 LBS | SYSTOLIC BLOOD PRESSURE: 94 MMHG | RESPIRATION RATE: 16 BRPM | DIASTOLIC BLOOD PRESSURE: 60 MMHG | TEMPERATURE: 97.7 F | HEART RATE: 68 BPM | HEIGHT: 64 IN | OXYGEN SATURATION: 98 % | BODY MASS INDEX: 22.67 KG/M2

## 2024-08-05 DIAGNOSIS — E03.9 ACQUIRED HYPOTHYROIDISM: ICD-10-CM

## 2024-08-05 DIAGNOSIS — L60.8 DISCOLORATION OF NAIL: ICD-10-CM

## 2024-08-05 DIAGNOSIS — B00.9 HERPESVIRAL INFECTION, UNSPECIFIED: ICD-10-CM

## 2024-08-05 DIAGNOSIS — Z79.899 OTHER LONG TERM (CURRENT) DRUG THERAPY: ICD-10-CM

## 2024-08-05 DIAGNOSIS — M53.9 BACK DISORDER: ICD-10-CM

## 2024-08-05 DIAGNOSIS — E78.00 HYPERCHOLESTEROLEMIA: ICD-10-CM

## 2024-08-05 DIAGNOSIS — M79.7 FIBROMYALGIA: ICD-10-CM

## 2024-08-05 DIAGNOSIS — I10 ESSENTIAL HYPERTENSION, BENIGN: Primary | ICD-10-CM

## 2024-08-05 LAB
ALBUMIN SERPL-MCNC: 3.7 G/DL (ref 3.5–5)
ALBUMIN/GLOB SERPL: 1.1 (ref 1.1–2.2)
ALP SERPL-CCNC: 57 U/L (ref 45–117)
ALT SERPL-CCNC: 17 U/L (ref 12–78)
ANION GAP SERPL CALC-SCNC: 7 MMOL/L (ref 5–15)
AST SERPL-CCNC: 26 U/L (ref 15–37)
BASOPHILS # BLD: 0 K/UL (ref 0–0.1)
BASOPHILS NFR BLD: 1 % (ref 0–1)
BILIRUB SERPL-MCNC: 0.6 MG/DL (ref 0.2–1)
BUN SERPL-MCNC: 8 MG/DL (ref 6–20)
BUN/CREAT SERPL: 13 (ref 12–20)
CALCIUM SERPL-MCNC: 9.7 MG/DL (ref 8.5–10.1)
CHLORIDE SERPL-SCNC: 97 MMOL/L (ref 97–108)
CHOLEST SERPL-MCNC: 255 MG/DL
CO2 SERPL-SCNC: 33 MMOL/L (ref 21–32)
CREAT SERPL-MCNC: 0.6 MG/DL (ref 0.55–1.02)
DIFFERENTIAL METHOD BLD: NORMAL
EOSINOPHIL # BLD: 0.1 K/UL (ref 0–0.4)
EOSINOPHIL NFR BLD: 2 % (ref 0–7)
ERYTHROCYTE [DISTWIDTH] IN BLOOD BY AUTOMATED COUNT: 13.1 % (ref 11.5–14.5)
GLOBULIN SER CALC-MCNC: 3.3 G/DL (ref 2–4)
GLUCOSE SERPL-MCNC: 101 MG/DL (ref 65–100)
HCT VFR BLD AUTO: 37.9 % (ref 35–47)
HDLC SERPL-MCNC: 108 MG/DL
HDLC SERPL: 2.4 (ref 0–5)
HGB BLD-MCNC: 12.9 G/DL (ref 11.5–16)
IMM GRANULOCYTES # BLD AUTO: 0 K/UL (ref 0–0.04)
IMM GRANULOCYTES NFR BLD AUTO: 0 % (ref 0–0.5)
LDLC SERPL CALC-MCNC: 133.2 MG/DL (ref 0–100)
LYMPHOCYTES # BLD: 1.5 K/UL (ref 0.8–3.5)
LYMPHOCYTES NFR BLD: 26 % (ref 12–49)
MAGNESIUM SERPL-MCNC: 1.7 MG/DL (ref 1.6–2.4)
MCH RBC QN AUTO: 30.5 PG (ref 26–34)
MCHC RBC AUTO-ENTMCNC: 34 G/DL (ref 30–36.5)
MCV RBC AUTO: 89.6 FL (ref 80–99)
MONOCYTES # BLD: 0.6 K/UL (ref 0–1)
MONOCYTES NFR BLD: 10 % (ref 5–13)
NEUTS SEG # BLD: 3.6 K/UL (ref 1.8–8)
NEUTS SEG NFR BLD: 61 % (ref 32–75)
NRBC # BLD: 0 K/UL (ref 0–0.01)
NRBC BLD-RTO: 0 PER 100 WBC
PLATELET # BLD AUTO: 233 K/UL (ref 150–400)
PMV BLD AUTO: 10.2 FL (ref 8.9–12.9)
POTASSIUM SERPL-SCNC: 3.9 MMOL/L (ref 3.5–5.1)
PROT SERPL-MCNC: 7 G/DL (ref 6.4–8.2)
RBC # BLD AUTO: 4.23 M/UL (ref 3.8–5.2)
SODIUM SERPL-SCNC: 137 MMOL/L (ref 136–145)
T4 FREE SERPL-MCNC: 1.5 NG/DL (ref 0.8–1.5)
TRIGL SERPL-MCNC: 69 MG/DL
TSH SERPL DL<=0.05 MIU/L-ACNC: 0.81 UIU/ML (ref 0.36–3.74)
VLDLC SERPL CALC-MCNC: 13.8 MG/DL
WBC # BLD AUTO: 5.8 K/UL (ref 3.6–11)

## 2024-08-05 PROCEDURE — 3074F SYST BP LT 130 MM HG: CPT | Performed by: FAMILY MEDICINE

## 2024-08-05 PROCEDURE — 99214 OFFICE O/P EST MOD 30 MIN: CPT | Performed by: FAMILY MEDICINE

## 2024-08-05 PROCEDURE — 3078F DIAST BP <80 MM HG: CPT | Performed by: FAMILY MEDICINE

## 2024-08-05 RX ORDER — VALACYCLOVIR HYDROCHLORIDE 500 MG/1
500 TABLET, FILM COATED ORAL DAILY
Qty: 90 TABLET | Refills: 3 | Status: SHIPPED | OUTPATIENT
Start: 2024-08-05

## 2024-08-05 RX ORDER — CICLOPIROX 80 MG/ML
SOLUTION TOPICAL
Qty: 6 ML | Refills: 5 | Status: SHIPPED | OUTPATIENT
Start: 2024-08-05

## 2024-08-05 ASSESSMENT — PATIENT HEALTH QUESTIONNAIRE - PHQ9
10. IF YOU CHECKED OFF ANY PROBLEMS, HOW DIFFICULT HAVE THESE PROBLEMS MADE IT FOR YOU TO DO YOUR WORK, TAKE CARE OF THINGS AT HOME, OR GET ALONG WITH OTHER PEOPLE: NOT DIFFICULT AT ALL
3. TROUBLE FALLING OR STAYING ASLEEP: NOT AT ALL
8. MOVING OR SPEAKING SO SLOWLY THAT OTHER PEOPLE COULD HAVE NOTICED. OR THE OPPOSITE, BEING SO FIGETY OR RESTLESS THAT YOU HAVE BEEN MOVING AROUND A LOT MORE THAN USUAL: NOT AT ALL
9. THOUGHTS THAT YOU WOULD BE BETTER OFF DEAD, OR OF HURTING YOURSELF: NOT AT ALL
SUM OF ALL RESPONSES TO PHQ9 QUESTIONS 1 & 2: 0
SUM OF ALL RESPONSES TO PHQ QUESTIONS 1-9: 0
4. FEELING TIRED OR HAVING LITTLE ENERGY: NOT AT ALL
SUM OF ALL RESPONSES TO PHQ QUESTIONS 1-9: 0
7. TROUBLE CONCENTRATING ON THINGS, SUCH AS READING THE NEWSPAPER OR WATCHING TELEVISION: NOT AT ALL
SUM OF ALL RESPONSES TO PHQ QUESTIONS 1-9: 0
6. FEELING BAD ABOUT YOURSELF - OR THAT YOU ARE A FAILURE OR HAVE LET YOURSELF OR YOUR FAMILY DOWN: NOT AT ALL
5. POOR APPETITE OR OVEREATING: NOT AT ALL
1. LITTLE INTEREST OR PLEASURE IN DOING THINGS: NOT AT ALL
SUM OF ALL RESPONSES TO PHQ QUESTIONS 1-9: 0
2. FEELING DOWN, DEPRESSED OR HOPELESS: NOT AT ALL

## 2024-08-05 NOTE — PROGRESS NOTES
Identified pt with two pt identifiers(name and ).    Chief Complaint   Patient presents with    Medication Check     Patient     Blood Pressure Check     Patients BP was a little low Saturday and  and was around 91/62 and would like to discuss         Health Maintenance Due   Topic    Hepatitis B vaccine (1 of 3 - 3-dose series)    Pneumococcal 0-64 years Vaccine (1 of 2 - PCV)    Flu vaccine (1)       Wt Readings from Last 3 Encounters:   24 73 kg (161 lb)   23 73 kg (161 lb)   10/25/23 73 kg (161 lb)     Temp Readings from Last 3 Encounters:   10/25/23 99.5 °F (37.5 °C) (Temporal)     BP Readings from Last 3 Encounters:   10/25/23 116/70   23 (!) 102/55   23 108/70     Pulse Readings from Last 3 Encounters:   10/25/23 71   23 79   22 77           Depression Screening:  :         2024    10:32 AM 10/25/2023     1:40 PM 2022    10:00 AM   PHQ-9 Questionaire   Little interest or pleasure in doing things 0 0 0   Feeling down, depressed, or hopeless 0 0 0   Trouble falling or staying asleep, or sleeping too much 0     Feeling tired or having little energy 0     Poor appetite or overeating 0     Feeling bad about yourself - or that you are a failure or have let yourself or your family down 0     Trouble concentrating on things, such as reading the newspaper or watching television 0     Moving or speaking so slowly that other people could have noticed. Or the opposite - being so fidgety or restless that you have been moving around a lot more than usual 0     Thoughts that you would be better off dead, or of hurting yourself in some way 0     PHQ-9 Total Score 0 0 0   If you checked off any problems, how difficult have these problems made it for you to do your work, take care of things at home, or get along with other people? 0          Fall Risk Assessment:  :          No data to display                 Abuse Screening:  :          No data to display

## 2024-08-07 ENCOUNTER — TELEPHONE (OUTPATIENT)
Age: 54
End: 2024-08-07

## 2024-08-07 NOTE — TELEPHONE ENCOUNTER
----- Message from Becca Garber MD sent at 8/7/2024  1:29 PM EDT -----  Labs show increase in cholesterol numbers.  This is surprising given her weight reduction.  Follow low-fat diet.  Borderline elevated blood sugar.  Normal electrolytes, kidney, and liver function tests.  Good thyroid level.  Normal blood cell counts.

## 2024-09-26 RX ORDER — ESTRADIOL 2 MG/1
2 TABLET ORAL DAILY
Qty: 30 TABLET | Refills: 0 | Status: SHIPPED | OUTPATIENT
Start: 2024-09-26

## 2024-10-16 NOTE — PROGRESS NOTES
Concepcion Hidalgo is a 54 y.o. female returns for an annual exam     No chief complaint on file.      No LMP recorded. Patient has had a hysterectomy.  Her periods are absent in flow   Problems: no problems  Birth Control: status post hysterectomy.  Last Pap: normal obtained few year(s) ago.  She does not have a history of CARY 2, 3 or cervical cancer.   Last Mammogram: had her mammogram today in our office.           1. Have you been to the ER, urgent care clinic, or hospitalized since your last visit? No    2. Have you seen or consulted any other health care providers outside of the [facility] since your last visit? No    Examination chaperoned by Betty Ivory LPN.

## 2024-10-21 RX ORDER — ESTRADIOL 2 MG/1
2 TABLET ORAL DAILY
Qty: 90 TABLET | Refills: 1 | OUTPATIENT
Start: 2024-10-21

## 2024-10-22 ENCOUNTER — OFFICE VISIT (OUTPATIENT)
Age: 54
End: 2024-10-22
Payer: COMMERCIAL

## 2024-10-22 VITALS — DIASTOLIC BLOOD PRESSURE: 72 MMHG | BODY MASS INDEX: 22.45 KG/M2 | WEIGHT: 130.8 LBS | SYSTOLIC BLOOD PRESSURE: 115 MMHG

## 2024-10-22 DIAGNOSIS — Z01.419 ENCOUNTER FOR GYNECOLOGICAL EXAMINATION (GENERAL) (ROUTINE) WITHOUT ABNORMAL FINDINGS: Primary | ICD-10-CM

## 2024-10-22 PROCEDURE — 3074F SYST BP LT 130 MM HG: CPT | Performed by: OBSTETRICS & GYNECOLOGY

## 2024-10-22 PROCEDURE — 99396 PREV VISIT EST AGE 40-64: CPT | Performed by: OBSTETRICS & GYNECOLOGY

## 2024-10-22 PROCEDURE — 3078F DIAST BP <80 MM HG: CPT | Performed by: OBSTETRICS & GYNECOLOGY

## 2024-10-22 RX ORDER — ESTRADIOL 2 MG/1
2 TABLET ORAL DAILY
Qty: 90 TABLET | Refills: 4 | Status: SHIPPED | OUTPATIENT
Start: 2024-10-22

## 2024-10-22 NOTE — PROGRESS NOTES
Annual exam post supracervical hysterectomy post menopause      Concepcion Hidalgo is a No obstetric history on file.,  54 y.o. female   No LMP recorded. Patient has had a hysterectomy.    She presents for her annual checkup.     She is having no problems.  Using her estrogen vaginally and is very happy with it.    Menstrual status:  The patient is not having bleeding.    Hormonal status:  She is not having vasomotor symptoms.  The patient is not using any ERT.     Sexual history:    She  reports being sexually active and has had partner(s) who are male. She reports using the following method of birth control/protection: Surgical.    Per Nursing Note:  Last Pap: normal obtained few year(s) ago.  She does not have a history of CARY 2, 3 or cervical cancer.   Last Mammogram: had her mammogram today in our office.     Past Medical History:   Diagnosis Date    Abnormal Pap smear 1/2/2012    JEWELS -evaluation negative    Arthritis     Asthma     Atypical squamous cells of undetermined significance (ASCUS) on Papanicolaou smear of cervix 10/08/15    HPV Negative    Autoimmune disease (HCC)     sjogrens    Bartholin's gland cyst     right marsupialization    Diverticulitis     in the past- has seen Dr. Reuben Paz for evaluation    Dyspareunia     Endometriosis     Fibromyalgia     GERD (gastroesophageal reflux disease)     Hiatal hernia     Hypercholesterolemia 1/25/2017    Hypertension     Hypothyroid     IBS (irritable bowel syndrome)     Lichen sclerosus     Osteoarthritis     Pelvic pain     Rosacea     Sjogren's syndrome (HCC)     sees Dr. 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

## 2024-12-05 RX ORDER — MONTELUKAST SODIUM 10 MG/1
10 TABLET ORAL DAILY
Qty: 90 TABLET | Refills: 3 | Status: SHIPPED | OUTPATIENT
Start: 2024-12-05

## 2024-12-27 DIAGNOSIS — I10 ESSENTIAL (PRIMARY) HYPERTENSION: ICD-10-CM

## 2024-12-27 DIAGNOSIS — E03.9 HYPOTHYROIDISM, UNSPECIFIED: ICD-10-CM

## 2024-12-27 RX ORDER — LEVOTHYROXINE SODIUM 125 MCG
TABLET ORAL
Qty: 90 TABLET | Refills: 2 | Status: SHIPPED | OUTPATIENT
Start: 2024-12-27

## 2024-12-27 RX ORDER — HYDROCHLOROTHIAZIDE 25 MG/1
TABLET ORAL
Qty: 90 TABLET | Refills: 1 | Status: SHIPPED | OUTPATIENT
Start: 2024-12-27

## 2025-01-05 DIAGNOSIS — I10 ESSENTIAL (PRIMARY) HYPERTENSION: ICD-10-CM

## 2025-01-06 RX ORDER — LOSARTAN POTASSIUM 25 MG/1
25 TABLET ORAL DAILY
Qty: 90 TABLET | Refills: 0 | OUTPATIENT
Start: 2025-01-06

## 2025-01-08 DIAGNOSIS — I10 ESSENTIAL (PRIMARY) HYPERTENSION: ICD-10-CM

## 2025-01-08 RX ORDER — LOSARTAN POTASSIUM 25 MG/1
25 TABLET ORAL DAILY
Qty: 90 TABLET | Refills: 0 | OUTPATIENT
Start: 2025-01-08

## 2025-02-06 RX ORDER — ERGOCALCIFEROL 1.25 MG/1
50000 CAPSULE, LIQUID FILLED ORAL WEEKLY
Qty: 12 CAPSULE | Refills: 0 | Status: SHIPPED | OUTPATIENT
Start: 2025-02-06

## 2025-03-07 ENCOUNTER — LAB (OUTPATIENT)
Age: 55
End: 2025-03-07

## 2025-03-07 ENCOUNTER — OFFICE VISIT (OUTPATIENT)
Age: 55
End: 2025-03-07
Payer: COMMERCIAL

## 2025-03-07 VITALS
TEMPERATURE: 99.3 F | RESPIRATION RATE: 20 BRPM | WEIGHT: 143 LBS | BODY MASS INDEX: 24.41 KG/M2 | HEART RATE: 68 BPM | HEIGHT: 64 IN | DIASTOLIC BLOOD PRESSURE: 60 MMHG | SYSTOLIC BLOOD PRESSURE: 130 MMHG | OXYGEN SATURATION: 95 %

## 2025-03-07 DIAGNOSIS — E78.00 HYPERCHOLESTEROLEMIA: ICD-10-CM

## 2025-03-07 DIAGNOSIS — M35.00 SJOGREN'S SYNDROME, WITH UNSPECIFIED ORGAN INVOLVEMENT: ICD-10-CM

## 2025-03-07 DIAGNOSIS — E55.9 VITAMIN D DEFICIENCY, UNSPECIFIED: ICD-10-CM

## 2025-03-07 DIAGNOSIS — R55 SYNCOPE, UNSPECIFIED SYNCOPE TYPE: ICD-10-CM

## 2025-03-07 DIAGNOSIS — T78.40XA ALLERGIC REACTION, INITIAL ENCOUNTER: ICD-10-CM

## 2025-03-07 DIAGNOSIS — L50.9 HIVES: ICD-10-CM

## 2025-03-07 DIAGNOSIS — E03.9 ACQUIRED HYPOTHYROIDISM: ICD-10-CM

## 2025-03-07 DIAGNOSIS — I10 ESSENTIAL HYPERTENSION, BENIGN: Primary | ICD-10-CM

## 2025-03-07 DIAGNOSIS — M79.7 FIBROMYALGIA: ICD-10-CM

## 2025-03-07 DIAGNOSIS — Z79.899 OTHER LONG TERM (CURRENT) DRUG THERAPY: ICD-10-CM

## 2025-03-07 PROCEDURE — 3078F DIAST BP <80 MM HG: CPT | Performed by: FAMILY MEDICINE

## 2025-03-07 PROCEDURE — 99214 OFFICE O/P EST MOD 30 MIN: CPT | Performed by: FAMILY MEDICINE

## 2025-03-07 PROCEDURE — 3075F SYST BP GE 130 - 139MM HG: CPT | Performed by: FAMILY MEDICINE

## 2025-03-07 RX ORDER — EPINEPHRINE 0.3 MG/.3ML
0.3 INJECTION SUBCUTANEOUS ONCE
Qty: 0.3 ML | Refills: 2 | Status: SHIPPED | OUTPATIENT
Start: 2025-03-07 | End: 2025-03-07

## 2025-03-07 SDOH — ECONOMIC STABILITY: FOOD INSECURITY: WITHIN THE PAST 12 MONTHS, YOU WORRIED THAT YOUR FOOD WOULD RUN OUT BEFORE YOU GOT MONEY TO BUY MORE.: NEVER TRUE

## 2025-03-07 SDOH — ECONOMIC STABILITY: FOOD INSECURITY: WITHIN THE PAST 12 MONTHS, THE FOOD YOU BOUGHT JUST DIDN'T LAST AND YOU DIDN'T HAVE MONEY TO GET MORE.: NEVER TRUE

## 2025-03-07 ASSESSMENT — PATIENT HEALTH QUESTIONNAIRE - PHQ9
5. POOR APPETITE OR OVEREATING: NOT AT ALL
SUM OF ALL RESPONSES TO PHQ QUESTIONS 1-9: 0
2. FEELING DOWN, DEPRESSED OR HOPELESS: NOT AT ALL
10. IF YOU CHECKED OFF ANY PROBLEMS, HOW DIFFICULT HAVE THESE PROBLEMS MADE IT FOR YOU TO DO YOUR WORK, TAKE CARE OF THINGS AT HOME, OR GET ALONG WITH OTHER PEOPLE: NOT DIFFICULT AT ALL
1. LITTLE INTEREST OR PLEASURE IN DOING THINGS: NOT AT ALL
3. TROUBLE FALLING OR STAYING ASLEEP: NOT AT ALL
4. FEELING TIRED OR HAVING LITTLE ENERGY: NOT AT ALL
6. FEELING BAD ABOUT YOURSELF - OR THAT YOU ARE A FAILURE OR HAVE LET YOURSELF OR YOUR FAMILY DOWN: NOT AT ALL
SUM OF ALL RESPONSES TO PHQ QUESTIONS 1-9: 0
9. THOUGHTS THAT YOU WOULD BE BETTER OFF DEAD, OR OF HURTING YOURSELF: NOT AT ALL
7. TROUBLE CONCENTRATING ON THINGS, SUCH AS READING THE NEWSPAPER OR WATCHING TELEVISION: NOT AT ALL
8. MOVING OR SPEAKING SO SLOWLY THAT OTHER PEOPLE COULD HAVE NOTICED. OR THE OPPOSITE, BEING SO FIGETY OR RESTLESS THAT YOU HAVE BEEN MOVING AROUND A LOT MORE THAN USUAL: NOT AT ALL

## 2025-03-07 NOTE — PROGRESS NOTES
home, or get along with other people? 0 0          Fall Risk Assessment:  :          No data to display                 Abuse Screening:  :          No data to display                 Coordination of Care Questionnaire:  :     \"Have you been to the ER, urgent care clinic since your last visit?  Hospitalized since your last visit?\"    NO    “Have you seen or consulted any other health care providers outside our system since your last visit?”    NO        Click Here for Release of Records Request        
sounds: Normal breath sounds.   Musculoskeletal:      Cervical back: Neck supple.   Neurological:      General: No focal deficit present.      Mental Status: She is alert.   Psychiatric:         Mood and Affect: Mood normal.         Behavior: Behavior normal.                 An electronic signature was used to authenticate this note.    --Becca Garber MD

## 2025-03-08 LAB
25(OH)D3 SERPL-MCNC: 78.2 NG/ML (ref 30–100)
ALBUMIN SERPL-MCNC: 3.6 G/DL (ref 3.5–5)
ALBUMIN/GLOB SERPL: 1 (ref 1.1–2.2)
ALP SERPL-CCNC: 60 U/L (ref 45–117)
ALT SERPL-CCNC: 13 U/L (ref 12–78)
ANION GAP SERPL CALC-SCNC: 3 MMOL/L (ref 2–12)
AST SERPL-CCNC: 22 U/L (ref 15–37)
BASOPHILS # BLD: 0.08 K/UL (ref 0–0.1)
BASOPHILS NFR BLD: 1.1 % (ref 0–1)
BILIRUB SERPL-MCNC: 0.6 MG/DL (ref 0.2–1)
BUN SERPL-MCNC: 8 MG/DL (ref 6–20)
BUN/CREAT SERPL: 15 (ref 12–20)
CALCIUM SERPL-MCNC: 9.5 MG/DL (ref 8.5–10.1)
CHLORIDE SERPL-SCNC: 97 MMOL/L (ref 97–108)
CHOLEST SERPL-MCNC: 264 MG/DL
CO2 SERPL-SCNC: 35 MMOL/L (ref 21–32)
CREAT SERPL-MCNC: 0.54 MG/DL (ref 0.55–1.02)
DIFFERENTIAL METHOD BLD: ABNORMAL
EOSINOPHIL # BLD: 0.17 K/UL (ref 0–0.4)
EOSINOPHIL NFR BLD: 2.3 % (ref 0–7)
ERYTHROCYTE [DISTWIDTH] IN BLOOD BY AUTOMATED COUNT: 13.2 % (ref 11.5–14.5)
GLOBULIN SER CALC-MCNC: 3.5 G/DL (ref 2–4)
GLUCOSE SERPL-MCNC: 87 MG/DL (ref 65–100)
HCT VFR BLD AUTO: 42.5 % (ref 35–47)
HDLC SERPL-MCNC: 116 MG/DL
HDLC SERPL: 2.3 (ref 0–5)
HGB BLD-MCNC: 14.5 G/DL (ref 11.5–16)
IMM GRANULOCYTES # BLD AUTO: 0.05 K/UL (ref 0–0.04)
IMM GRANULOCYTES NFR BLD AUTO: 0.7 % (ref 0–0.5)
LDLC SERPL CALC-MCNC: 125.4 MG/DL (ref 0–100)
LYMPHOCYTES # BLD: 1.86 K/UL (ref 0.8–3.5)
LYMPHOCYTES NFR BLD: 25.4 % (ref 12–49)
MCH RBC QN AUTO: 31.2 PG (ref 26–34)
MCHC RBC AUTO-ENTMCNC: 34.1 G/DL (ref 30–36.5)
MCV RBC AUTO: 91.4 FL (ref 80–99)
MONOCYTES # BLD: 0.53 K/UL (ref 0–1)
MONOCYTES NFR BLD: 7.2 % (ref 5–13)
NEUTS SEG # BLD: 4.64 K/UL (ref 1.8–8)
NEUTS SEG NFR BLD: 63.3 % (ref 32–75)
NRBC # BLD: 0 K/UL (ref 0–0.01)
NRBC BLD-RTO: 0 PER 100 WBC
PLATELET # BLD AUTO: 267 K/UL (ref 150–400)
PMV BLD AUTO: 10 FL (ref 8.9–12.9)
POTASSIUM SERPL-SCNC: 3.4 MMOL/L (ref 3.5–5.1)
PROT SERPL-MCNC: 7.1 G/DL (ref 6.4–8.2)
RBC # BLD AUTO: 4.65 M/UL (ref 3.8–5.2)
SODIUM SERPL-SCNC: 135 MMOL/L (ref 136–145)
T4 FREE SERPL-MCNC: 1 NG/DL (ref 0.8–1.5)
TRIGL SERPL-MCNC: 113 MG/DL
TSH SERPL DL<=0.05 MIU/L-ACNC: 18.2 UIU/ML (ref 0.36–3.74)
VLDLC SERPL CALC-MCNC: 22.6 MG/DL
WBC # BLD AUTO: 7.3 K/UL (ref 3.6–11)

## 2025-03-09 ENCOUNTER — RESULTS FOLLOW-UP (OUTPATIENT)
Age: 55
End: 2025-03-09

## 2025-03-09 DIAGNOSIS — E03.9 ACQUIRED HYPOTHYROIDISM: Primary | ICD-10-CM

## 2025-03-09 RX ORDER — LEVOTHYROXINE SODIUM 150 MCG
150 TABLET ORAL DAILY
Qty: 90 TABLET | Refills: 0 | Status: SHIPPED | OUTPATIENT
Start: 2025-03-09

## 2025-03-10 DIAGNOSIS — E03.9 ACQUIRED HYPOTHYROIDISM: ICD-10-CM

## 2025-03-10 RX ORDER — LEVOTHYROXINE SODIUM 150 MCG
150 TABLET ORAL DAILY
Qty: 90 TABLET | Refills: 1 | Status: SHIPPED | OUTPATIENT
Start: 2025-03-10

## 2025-03-14 LAB
ALLERGEN LAMB/MUTTON, IGE, AGAL3: <0.1 KU/L
ALPHA-GAL IGE QN: <0.1 KU/L
BEEF IGE QN: <0.1 KU/L
IGA SERPL-MCNC: 158 MG/DL (ref 87–352)
IGE SERPL-ACNC: 114 IU/ML (ref 6–495)
IGE SERPL-ACNC: 121 IU/ML (ref 6–495)
IGG SERPL-MCNC: 1066 MG/DL (ref 586–1602)
IGM SERPL-MCNC: 69 MG/DL (ref 26–217)
Lab: NORMAL
PORK IGE QN: <0.1 KU/L

## 2025-05-01 RX ORDER — ERGOCALCIFEROL 1.25 MG/1
50000 CAPSULE, LIQUID FILLED ORAL WEEKLY
Qty: 12 CAPSULE | Refills: 0 | Status: SHIPPED | OUTPATIENT
Start: 2025-05-01

## 2025-07-03 DIAGNOSIS — I10 ESSENTIAL (PRIMARY) HYPERTENSION: ICD-10-CM

## 2025-07-03 RX ORDER — HYDROCHLOROTHIAZIDE 25 MG/1
25 TABLET ORAL DAILY
Qty: 90 TABLET | Refills: 1 | Status: SHIPPED | OUTPATIENT
Start: 2025-07-03

## 2025-07-23 DIAGNOSIS — B00.9 HERPESVIRAL INFECTION, UNSPECIFIED: ICD-10-CM

## 2025-07-23 RX ORDER — VALACYCLOVIR HYDROCHLORIDE 500 MG/1
500 TABLET, FILM COATED ORAL DAILY
Qty: 90 TABLET | Refills: 3 | Status: SHIPPED | OUTPATIENT
Start: 2025-07-23

## 2025-07-28 RX ORDER — ERGOCALCIFEROL 1.25 MG/1
50000 CAPSULE, LIQUID FILLED ORAL WEEKLY
Qty: 12 CAPSULE | Refills: 1 | Status: SHIPPED | OUTPATIENT
Start: 2025-07-28